# Patient Record
Sex: FEMALE | Race: WHITE | Employment: OTHER | URBAN - METROPOLITAN AREA
[De-identification: names, ages, dates, MRNs, and addresses within clinical notes are randomized per-mention and may not be internally consistent; named-entity substitution may affect disease eponyms.]

---

## 2017-01-18 DIAGNOSIS — I10 ESSENTIAL HYPERTENSION WITH GOAL BLOOD PRESSURE LESS THAN 130/80: ICD-10-CM

## 2017-01-18 RX ORDER — HYDROCHLOROTHIAZIDE 25 MG/1
25 TABLET ORAL DAILY
Qty: 30 TAB | Refills: 5 | Status: SHIPPED | OUTPATIENT
Start: 2017-01-18 | End: 2017-01-18 | Stop reason: SDUPTHER

## 2017-01-18 RX ORDER — HYDROCHLOROTHIAZIDE 25 MG/1
25 TABLET ORAL DAILY
Qty: 30 TAB | Refills: 5 | Status: SHIPPED | OUTPATIENT
Start: 2017-01-18 | End: 2017-07-12 | Stop reason: SDUPTHER

## 2017-01-18 RX ORDER — SIMVASTATIN 40 MG/1
40 TABLET, FILM COATED ORAL
Qty: 30 TAB | Refills: 5 | Status: SHIPPED | OUTPATIENT
Start: 2017-01-18 | End: 2017-07-12 | Stop reason: SDUPTHER

## 2017-01-25 ENCOUNTER — HOSPITAL ENCOUNTER (OUTPATIENT)
Dept: PREADMISSION TESTING | Age: 67
Discharge: HOME | End: 2017-01-25
Payer: COMMERCIAL

## 2017-01-25 VITALS
BODY MASS INDEX: 30.54 KG/M2 | SYSTOLIC BLOOD PRESSURE: 137 MMHG | RESPIRATION RATE: 17 BRPM | WEIGHT: 190.04 LBS | HEIGHT: 66 IN | HEART RATE: 83 BPM | TEMPERATURE: 98 F | OXYGEN SATURATION: 97 % | DIASTOLIC BLOOD PRESSURE: 66 MMHG

## 2017-01-25 LAB
ABO + RH BLD: NORMAL
ALBUMIN SERPL BCP-MCNC: 4.7 G/DL (ref 3.5–5)
ALBUMIN/GLOB SERPL: 1.7 {RATIO} (ref 1.1–2.2)
ALP SERPL-CCNC: 54 U/L (ref 45–117)
ALT SERPL-CCNC: 37 U/L (ref 12–78)
ANION GAP BLD CALC-SCNC: 13 MMOL/L (ref 5–15)
APPEARANCE UR: CLEAR
APTT PPP: 28.2 SEC (ref 22.1–32.5)
AST SERPL W P-5'-P-CCNC: 25 U/L (ref 15–37)
ATRIAL RATE: 67 BPM
BACTERIA URNS QL MICRO: NEGATIVE /HPF
BASOPHILS # BLD AUTO: 0 K/UL (ref 0–0.1)
BASOPHILS # BLD: 1 % (ref 0–1)
BILIRUB SERPL-MCNC: 0.4 MG/DL (ref 0.2–1)
BILIRUB UR QL: NEGATIVE
BLOOD GROUP ANTIBODIES SERPL: NORMAL
BUN SERPL-MCNC: 14 MG/DL (ref 6–20)
BUN/CREAT SERPL: 21 (ref 12–20)
CALCIUM SERPL-MCNC: 9.9 MG/DL (ref 8.5–10.1)
CALCULATED P AXIS, ECG09: 70 DEGREES
CALCULATED R AXIS, ECG10: 41 DEGREES
CALCULATED T AXIS, ECG11: 48 DEGREES
CHLORIDE SERPL-SCNC: 100 MMOL/L (ref 97–108)
CO2 SERPL-SCNC: 26 MMOL/L (ref 21–32)
COLOR UR: NORMAL
CREAT SERPL-MCNC: 0.68 MG/DL (ref 0.55–1.02)
CRP SERPL-MCNC: <0.29 MG/DL
DIAGNOSIS, 93000: NORMAL
EOSINOPHIL # BLD: 0.3 K/UL (ref 0–0.4)
EOSINOPHIL NFR BLD: 5 % (ref 0–7)
EPITH CASTS URNS QL MICRO: NORMAL /LPF
ERYTHROCYTE [DISTWIDTH] IN BLOOD BY AUTOMATED COUNT: 13 % (ref 11.5–14.5)
EST. AVERAGE GLUCOSE BLD GHB EST-MCNC: 105 MG/DL
GLOBULIN SER CALC-MCNC: 2.8 G/DL (ref 2–4)
GLUCOSE SERPL-MCNC: 78 MG/DL (ref 65–100)
GLUCOSE UR STRIP.AUTO-MCNC: NEGATIVE MG/DL
HBA1C MFR BLD: 5.3 % (ref 4.2–6.3)
HCT VFR BLD AUTO: 39.5 % (ref 35–47)
HGB BLD-MCNC: 13.2 G/DL (ref 11.5–16)
HGB UR QL STRIP: NEGATIVE
HYALINE CASTS URNS QL MICRO: NORMAL /LPF (ref 0–5)
INR PPP: 1 (ref 0.9–1.1)
KETONES UR QL STRIP.AUTO: NEGATIVE MG/DL
LEUKOCYTE ESTERASE UR QL STRIP.AUTO: NEGATIVE
LYMPHOCYTES # BLD AUTO: 47 % (ref 12–49)
LYMPHOCYTES # BLD: 2.9 K/UL (ref 0.8–3.5)
MCH RBC QN AUTO: 30.3 PG (ref 26–34)
MCHC RBC AUTO-ENTMCNC: 33.4 G/DL (ref 30–36.5)
MCV RBC AUTO: 90.6 FL (ref 80–99)
MONOCYTES # BLD: 0.4 K/UL (ref 0–1)
MONOCYTES NFR BLD AUTO: 6 % (ref 5–13)
NEUTS SEG # BLD: 2.5 K/UL (ref 1.8–8)
NEUTS SEG NFR BLD AUTO: 41 % (ref 32–75)
NITRITE UR QL STRIP.AUTO: NEGATIVE
P-R INTERVAL, ECG05: 176 MS
PH UR STRIP: 6 [PH] (ref 5–8)
PLATELET # BLD AUTO: 305 K/UL (ref 150–400)
POTASSIUM SERPL-SCNC: 4 MMOL/L (ref 3.5–5.1)
PROT SERPL-MCNC: 7.5 G/DL (ref 6.4–8.2)
PROT UR STRIP-MCNC: NEGATIVE MG/DL
PROTHROMBIN TIME: 10 SEC (ref 9–11.1)
Q-T INTERVAL, ECG07: 408 MS
QRS DURATION, ECG06: 90 MS
QTC CALCULATION (BEZET), ECG08: 431 MS
RBC # BLD AUTO: 4.36 M/UL (ref 3.8–5.2)
RBC #/AREA URNS HPF: NORMAL /HPF (ref 0–5)
SODIUM SERPL-SCNC: 139 MMOL/L (ref 136–145)
SP GR UR REFRACTOMETRY: 1.01 (ref 1–1.03)
SPECIMEN EXP DATE BLD: NORMAL
THERAPEUTIC RANGE,PTTT: NORMAL SECS (ref 58–77)
UA: UC IF INDICATED,UAUC: NORMAL
UROBILINOGEN UR QL STRIP.AUTO: 0.2 EU/DL (ref 0.2–1)
VENTRICULAR RATE, ECG03: 67 BPM
WBC # BLD AUTO: 6.2 K/UL (ref 3.6–11)
WBC URNS QL MICRO: NORMAL /HPF (ref 0–4)

## 2017-01-25 PROCEDURE — 84466 ASSAY OF TRANSFERRIN: CPT | Performed by: ORTHOPAEDIC SURGERY

## 2017-01-25 PROCEDURE — 80053 COMPREHEN METABOLIC PANEL: CPT | Performed by: ORTHOPAEDIC SURGERY

## 2017-01-25 PROCEDURE — 93005 ELECTROCARDIOGRAM TRACING: CPT

## 2017-01-25 PROCEDURE — 85610 PROTHROMBIN TIME: CPT | Performed by: ORTHOPAEDIC SURGERY

## 2017-01-25 PROCEDURE — 81001 URINALYSIS AUTO W/SCOPE: CPT | Performed by: ORTHOPAEDIC SURGERY

## 2017-01-25 PROCEDURE — 36415 COLL VENOUS BLD VENIPUNCTURE: CPT | Performed by: ORTHOPAEDIC SURGERY

## 2017-01-25 PROCEDURE — 85730 THROMBOPLASTIN TIME PARTIAL: CPT | Performed by: ORTHOPAEDIC SURGERY

## 2017-01-25 PROCEDURE — 86140 C-REACTIVE PROTEIN: CPT | Performed by: ORTHOPAEDIC SURGERY

## 2017-01-25 PROCEDURE — 86900 BLOOD TYPING SEROLOGIC ABO: CPT | Performed by: ORTHOPAEDIC SURGERY

## 2017-01-25 PROCEDURE — 83036 HEMOGLOBIN GLYCOSYLATED A1C: CPT | Performed by: ORTHOPAEDIC SURGERY

## 2017-01-25 PROCEDURE — 85025 COMPLETE CBC W/AUTO DIFF WBC: CPT | Performed by: ORTHOPAEDIC SURGERY

## 2017-01-25 RX ORDER — IBUPROFEN 800 MG/1
800 TABLET ORAL
COMMUNITY
End: 2018-07-04 | Stop reason: CLARIF

## 2017-01-25 NOTE — PERIOP NOTES
[facility]  PREOPERATIVE INSTRUCTIONS  02/06/2017  Surgery arrival time given by surgeon: NO   If [facility] staff will call you between 4 PM- 8 PM the day before surgery with your arrival time. If your surgery is on a Monday, we will call you the preceding Friday. Please call [phone] after 8 PM if you did not receive your arrival time. 1. Please report at the designated time to the [address] [place] [address] [facility]. Bring your insurance card, photo identification, and any copayment ( if applicable). 2. You must have a responsible adult to drive you home. You need to have a responsible adult to stay with you the first 24 hours after surgery if you are going home the same day of your surgery and you should not drive a car for 24 hours following your surgery. 3. Nothing to eat or drink after midnight the night before surgery. This includes no water, gum, mints, coffee, juice, etc.  Please note special instructions, if applicable, below for medications. 4. MEDICATIONS TO TAKE THE MORNING OF SURGERY WITH A SIP OF WATER: ________none______        Your prescription pain medicine may be taken with a sip of water the morning of surgery  5. No alcoholic beverages 24 hours before or after your surgery. 6. If you are being admitted to the hospital, please leave personal belongings/luggage in your car until you have an assigned hospital room number. 7. Stop Aspirin and/or any non-steroidal anti-inflammatory drugs (i.e. Ibuprofen, Naproxen, Advil, Aleve) as directed by your surgeon. You may take Tylenol. 8. Stop herbal supplements 1 week prior to surgery. 9. If you are currently taking Plavix, Coumadin,or any other blood-thinning/anticoagulant medication contact your surgeon for instructions. 10. Please wear comfortable clothes. Wear your glasses instead of contacts. We ask that all money, jewelry and valuables be left at home. Wear no make-up, particularly mascara, the day of surgery.    11.  All body piercings, rings and jewelry need to be removed and left at home. Please wear your hair loose or down. Please no pony-tails, buns, or any metal hair accessories. If you shower the morning of surgery, please do not apply any lotions, powders, or deodorants afterwards. Do not shave any body area within 24 hours of your surgery. 12. Please follow all instructions to avoid any potential surgical cancellation. 13. Should your physical condition change, (i.e. fever, cold, flu, etc.) please notify your surgeon as soon as possible. 14. It is important to be on time. If a situation occurs where you may be delayed, please call:  [phone] / [phone] on the day of surgery. 15. The Preadmission Testing staff can be reached at [phone]. Tripp Patrick 12. Bring your completed Medication Reconciliation sheet with your the morning of surgery  Special instructions:   · Free  Parking between [address]  The patient was contacted  in person. She  verbalizes  understanding of all instructions   Medications reviewed and med reconciliation sheets for prescriptions given to patient for review & return day of surgery. Special Instructions:  · Use Chlorhexidine Care Fusion wash and sponges 3 days prior to surgery as instructed. · Incentive spirometer given with instructions to practice at home and bring back to the hospital on the day of surgery. · [facility] will contact you if your Hemoglobin A1C is greater than 7.5. · Ensure/Glucerna  sample, nutritional information, and Ensure/Glucerna coupon given. · Pain pamphlet and Call Don't Fall reminder reviewed with patient.   ·  parking is complimentary Monday - Friday 7 am - 5 pm  · Bring PTA Medication list day of surgery with the last doses taken documented

## 2017-01-25 NOTE — H&P
PAT Pre-Op History & Physical    Patient: Peace Celestin                  MRN: [id number]          SSN: [id number]  YOB: 1950          Age: 69 y.o. Sex: female                Subjective:   Patient is a 69 y.o.  female who presents with history of chronic left hip pain that has been a problem for about 2 years per patient report. Rates left hip pain 5-8/10 ans describes it as a constant nagging ache but can be sharp at times. States pain began in her groin but now is more in the front of her left thigh and into her left knee. The pain is exacerbated by prolonged walking or transitioning positions (standing up from being seated,etc). Has failed steroid injections, NSAIDs, narcotic pain medication, and PT. The patient was evaluated in the surgeon's office and it was determined that the most appropriate plan of care is to proceed with surgical intervention. Patient's PCP Sal Matthew MD            Past Medical History   Diagnosis Date    Arthritis      L knee OA    Hypercholesterolemia     Hypertension     Ill-defined condition      obese  BMI= 30.7 on 1/25/2017    Liver disease      H/O HEPATITIS A  contracted while working in [employer] [facility] Other ill-defined conditions(799.89) [address] AS [facility] 6CM TILT      Past Surgical History   Procedure Laterality Date    Hx appendectomy  1980s    Endoscopy, colon, diagnostic  1995 & 2005 2015    Hx orthopaedic  1995     L BUNIONECTOMY    Hx orthopaedic  4/12     A/S Meniscal Repair L Knee    Hx orthopaedic  4/11     A/S Meniscal Repair R Knee      Prior to Admission medications    Medication Sig Start Date End Date Taking? Authorizing Provider   glucosam-chond-msm-boron-hyal 204-62.9-652-6 mg tab Take 1 Tab by mouth daily. Yes Historical Provider   ibuprofen (MOTRIN) 800 mg tablet Take 800 mg by mouth every six (6) hours as needed for Pain.  Indications: OSTEOARTHRITIS   Yes Historical Provider   hydroCHLOROthiazide (HYDRODIURIL) 25 mg tablet Take 1 Tab by mouth daily for 30 days. 1/18/17 2/17/17 Yes Jose Alejandro Mart MD   simvastatin (ZOCOR) 40 mg tablet Take 1 Tab by mouth nightly. 1/18/17  Yes Jose Alejandro Mart MD   HYDROcodone-acetaminophen (NORCO) 7.5-325 mg per tablet Take 1 Tab by mouth every six (6) hours as needed for Pain. Max Daily Amount: 4 Tabs. 2/8/16  Yes Zelad Martinez MD   multivitamin (ONE A DAY) tablet Take 1 Tab by mouth daily. Yes Historical Provider   FLAXSEED OIL (OMEGA 3 PO) Take 1 Cap by mouth daily. Yes Historical Provider   b complex vitamins tablet Take 1 Tab by mouth daily. Yes Historical Provider   nicotinic acid (NIACIN) 500 mg tablet Take 500 mg by mouth Daily (before breakfast). Yes Historical Provider   OTHER Juice plus for vegetable and fruit supplementation    Yes Historical Provider   aspirin delayed-release 81 mg tablet take 81 mg by mouth daily. Yes Historical Provider     Current Outpatient Prescriptions   Medication Sig    glucosam-chond-msm-boron-hyal 110-69.9-803-3 mg tab Take 1 Tab by mouth daily.  ibuprofen (MOTRIN) 800 mg tablet Take 800 mg by mouth every six (6) hours as needed for Pain. Indications: OSTEOARTHRITIS    hydroCHLOROthiazide (HYDRODIURIL) 25 mg tablet Take 1 Tab by mouth daily for 30 days.  simvastatin (ZOCOR) 40 mg tablet Take 1 Tab by mouth nightly.  HYDROcodone-acetaminophen (NORCO) 7.5-325 mg per tablet Take 1 Tab by mouth every six (6) hours as needed for Pain. Max Daily Amount: 4 Tabs.  multivitamin (ONE A DAY) tablet Take 1 Tab by mouth daily.  FLAXSEED OIL (OMEGA 3 PO) Take 1 Cap by mouth daily.  b complex vitamins tablet Take 1 Tab by mouth daily.  nicotinic acid (NIACIN) 500 mg tablet Take 500 mg by mouth Daily (before breakfast).  OTHER Juice plus for vegetable and fruit supplementation     aspirin delayed-release 81 mg tablet take 81 mg by mouth daily.      No current facility-administered medications for this encounter. No Known Allergies   Social History   Substance Use Topics    Smoking status: Never Smoker    Smokeless tobacco: Never Used    Alcohol use 1.5 - 3.5 oz/week     3 - 7 Standard drinks or equivalent per week      Comment: social glass of wine or beer nightly      Family History   Problem Relation Age of Onset    Heart Disease Mother     Hypertension Mother     Stroke Mother     Elevated Lipids Father     Liver Disease Father     MS Sister     MS Sister     Heart Disease Maternal Grandmother     Stroke Maternal Grandfather           Review of Systems    Patient denies difficulty swallowing, mouth sores, or loose teeth. Patient denies chest pain, tightness, pain radiating down left arm, palpitations. Denies dizziness, visual disturbances, or lightheadedness. Patient denies shortness of breath, wheezing, cough, fever, or chills. Patient denies diarrhea or abdominal pain. C/o constipation at times. Patient denies urinary problems including dysuria, hesitancy, urgency, or incontinence. Denies skin breakdown, rashes, insect bites or open area. Objective:   Patient Vitals for the past 24 hrs:   Temp Pulse Resp BP SpO2   17 1324 98 °F (36.7 °C) 83 17 137/66 97 %     Temp (24hrs), Av °F (36.7 °C), Min:98 °F (36.7 °C), Max:98 °F (36.7 °C)    Body mass index is 30.67 kg/(m^2). Wt Readings from Last 1 Encounters:   17 86.2 kg (190 lb 0.6 oz)        Physical Exam:     General: Pleasant,  cooperative, no apparent distress, appears stated age. Eyes: Conjunctivae/corneas clear. EOMs intact. Nose: Nares normal.   Mouth/Throat: Lips, mucosa, and tongue normal. Teeth and gums normal.   Neck: Supple, symmetrical, trachea midline. Back: Symmetric   Lungs: Clear to auscultation bilaterally. Heart: Regular rate and rhythm, S1, S2 normal. No murmur, click, rub or gallop. Abdomen: Soft, non-tender.  Bowel sounds normal. No distention. Musculoskeletal:  Gait is antalgic. Extremities:  Extremities normal, atraumatic, no cyanosis or edema. Calves                                 supple, non tender to palpation. Pulses: 2+ and symmetric bilateral upper extremities. Cap. refill <2 seconds   Skin: Skin color, texture, turgor normal.  No rashes or lesions. Neurologic: CN II-XII grossly intact. Alert and oriented x3. Labs:   Recent Results (from the past 72 hour(s))   C REACTIVE PROTEIN, QT    Collection Time: 01/25/17  2:37 PM   Result Value Ref Range    C-Reactive protein <0.29 <0.60 mg/dL   CBC WITH AUTOMATED DIFF    Collection Time: 01/25/17  2:37 PM   Result Value Ref Range    WBC 6.2 3.6 - 11.0 K/uL    RBC 4.36 3.80 - 5.20 M/uL    HGB 13.2 11.5 - 16.0 g/dL    HCT 39.5 35.0 - 47.0 %    MCV 90.6 80.0 - 99.0 FL    MCH 30.3 26.0 - 34.0 PG    MCHC 33.4 30.0 - 36.5 g/dL    RDW 13.0 11.5 - 14.5 %    PLATELET 088 508 - 348 K/uL    NEUTROPHILS 41 32 - 75 %    LYMPHOCYTES 47 12 - 49 %    MONOCYTES 6 5 - 13 %    EOSINOPHILS 5 0 - 7 %    BASOPHILS 1 0 - 1 %    ABS. NEUTROPHILS 2.5 1.8 - 8.0 K/UL    ABS. LYMPHOCYTES 2.9 0.8 - 3.5 K/UL    ABS. MONOCYTES 0.4 0.0 - 1.0 K/UL    ABS. EOSINOPHILS 0.3 0.0 - 0.4 K/UL    ABS. BASOPHILS 0.0 0.0 - 0.1 K/UL   METABOLIC PANEL, COMPREHENSIVE    Collection Time: 01/25/17  2:37 PM   Result Value Ref Range    Sodium 139 136 - 145 mmol/L    Potassium 4.0 3.5 - 5.1 mmol/L    Chloride 100 97 - 108 mmol/L    CO2 26 21 - 32 mmol/L    Anion gap 13 5 - 15 mmol/L    Glucose 78 65 - 100 mg/dL    BUN 14 6 - 20 MG/DL    Creatinine 0.68 0.55 - 1.02 MG/DL    BUN/Creatinine ratio 21 (H) 12 - 20      GFR est AA >60 >60 ml/min/1.73m2    GFR est non-AA >60 >60 ml/min/1.73m2    Calcium 9.9 8.5 - 10.1 MG/DL    Bilirubin, total 0.4 0.2 - 1.0 MG/DL    ALT 37 12 - 78 U/L    AST 25 15 - 37 U/L    Alk.  phosphatase 54 45 - 117 U/L    Protein, total 7.5 6.4 - 8.2 g/dL    Albumin 4.7 3.5 - 5.0 g/dL    Globulin 2.8 2.0 - 4.0 g/dL A-G Ratio 1.7 1.1 - 2.2     PROTHROMBIN TIME + INR    Collection Time: 01/25/17  2:37 PM   Result Value Ref Range    INR 1.0 0.9 - 1.1      Prothrombin time 10.0 9.0 - 11.1 sec   PTT    Collection Time: 01/25/17  2:37 PM   Result Value Ref Range    aPTT 28.2 22.1 - 32.5 sec    aPTT, therapeutic range     58.0 - 77.0 SECS   URINALYSIS W/ REFLEX CULTURE    Collection Time: 01/25/17  2:37 PM   Result Value Ref Range    Color YELLOW/STRAW      Appearance CLEAR CLEAR      Specific gravity 1.007 1.003 - 1.030      pH (UA) 6.0 5.0 - 8.0      Protein NEGATIVE  NEG mg/dL    Glucose NEGATIVE  NEG mg/dL    Ketone NEGATIVE  NEG mg/dL    Bilirubin NEGATIVE  NEG      Blood NEGATIVE  NEG      Urobilinogen 0.2 0.2 - 1.0 EU/dL    Nitrites NEGATIVE  NEG      Leukocyte Esterase NEGATIVE  NEG      WBC 0-4 0 - 4 /hpf    RBC 0-5 0 - 5 /hpf    Epithelial cells FEW FEW /lpf    Bacteria NEGATIVE  NEG /hpf    UA:UC IF INDICATED CULTURE NOT INDICATED BY UA RESULT CNI      Hyaline Cast 0-2 0 - 5 /lpf   HEMOGLOBIN A1C WITH EAG    Collection Time: 01/25/17  2:45 PM   Result Value Ref Range    Hemoglobin A1c 5.3 4.2 - 6.3 %    Est. average glucose 105 mg/dL   TYPE & SCREEN    Collection Time: 01/25/17  2:45 PM   Result Value Ref Range    Crossmatch Expiration 02/08/2017     ABO/Rh(D) O NEGATIVE     Antibody screen NEG    EKG, 12 LEAD, INITIAL    Collection Time: 01/25/17  3:19 PM   Result Value Ref Range    Ventricular Rate 67 BPM    Atrial Rate 67 BPM    P-R Interval 176 ms    QRS Duration 90 ms    Q-T Interval 408 ms    QTC Calculation (Bezet) 431 ms    Calculated P Axis 70 degrees    Calculated R Axis 41 degrees    Calculated T Axis 48 degrees    Diagnosis       Normal sinus rhythm  Possible Left atrial enlargement  RSR' or QR pattern in V1 suggests right ventricular conduction delay  Borderline ECG  When compared with ECG of 06-APR-2011 17:55,  No significant change was found  Confirmed by Fredy Oliva MD, [id number] ([id number]) on 1/25/2017 4:30:00 PM Assessment:     Left hip OA. Plan:     Scheduled for left hip arthroplasty- direct anterior. Labs and EKG done per surgeon's orders. Labs and EKG results reviewed- unremarkable. Attended joint class 1/25/2017.     Michelle Bueno NP

## 2017-01-25 NOTE — PERIOP NOTES
[id number] call to Dr Winter Campos office for orders   pt stated she is having her left hip replaced not the right hip as noted on orders and posting    [phone] new orders received stating pt is having left hip replacement

## 2017-01-26 LAB
BACTERIA SPEC CULT: NORMAL
BACTERIA SPEC CULT: NORMAL
SERVICE CMNT-IMP: NORMAL

## 2017-01-27 LAB — TRANSFERRIN SERPL-MCNC: 316 MG/DL (ref 200–370)

## 2017-01-27 NOTE — PROGRESS NOTES
Problem: Patient Education: Go to Patient Education Activity  Goal: Patient/Family Education  Outcome: Progressing Towards Goal  The patient attended the pre-operative joint replacement class. The content of the class, using a power-point presentation as well as visual demonstration was specific for patients undergoing total knee and total hip replacements. A pre-operative education booklet was provided to all patients. At the conclusion of class an opportunity was offered for any question or concerns the patient or family may have regarding their upcoming scheduled procedure. Patient attended class on 1/25/17, no  present.

## 2017-01-31 ENCOUNTER — OFFICE VISIT (OUTPATIENT)
Dept: INTERNAL MEDICINE CLINIC | Age: 67
End: 2017-01-31

## 2017-01-31 VITALS
BODY MASS INDEX: 30.22 KG/M2 | TEMPERATURE: 98.4 F | HEIGHT: 66 IN | RESPIRATION RATE: 16 BRPM | OXYGEN SATURATION: 99 % | WEIGHT: 188 LBS | HEART RATE: 75 BPM | SYSTOLIC BLOOD PRESSURE: 131 MMHG | DIASTOLIC BLOOD PRESSURE: 60 MMHG

## 2017-01-31 DIAGNOSIS — M16.12 PRIMARY OSTEOARTHRITIS OF LEFT HIP: Primary | ICD-10-CM

## 2017-01-31 DIAGNOSIS — K59.04 CHRONIC IDIOPATHIC CONSTIPATION: ICD-10-CM

## 2017-01-31 DIAGNOSIS — Z01.818 PREOP GENERAL PHYSICAL EXAM: ICD-10-CM

## 2017-01-31 DIAGNOSIS — E78.00 HYPERCHOLESTEREMIA: ICD-10-CM

## 2017-01-31 DIAGNOSIS — M85.80 OSTEOPENIA: ICD-10-CM

## 2017-01-31 RX ORDER — CHOLECALCIFEROL (VITAMIN D3) 125 MCG
5 CAPSULE ORAL
COMMUNITY
End: 2018-07-19 | Stop reason: CLARIF

## 2017-01-31 NOTE — PROGRESS NOTES
Have you been to the ER or urgent care clinic since your last visit? No     Have you been hospitalized since your last visit? No     Have you been seen or consulted any other health care provider outside of [address] [place] [address] since your last visit (including pap smears, colonoscopy screening)?   No

## 2017-01-31 NOTE — MR AVS SNAPSHOT
Visit Information Date & Time Provider Department Dept. Phone Encounter #  
 1/31/2017  2:00 PM Trev Camarena NP [facility] [address] [id number] Upcoming Health Maintenance Date Due COLONOSCOPY 1/18/1966 Pneumococcal 65+ Low/Medium Risk (2 of 2 - PCV13) 9/8/2015 INFLUENZA AGE 9 TO ADULT 8/1/2016 MEDICARE YEARLY EXAM 8/17/2017 GLAUCOMA SCREENING Q2Y 8/17/2018 BREAST CANCER SCRN MAMMOGRAM 9/29/2018 DTaP/Tdap/Td series (2 - Td) 1/1/2023 Allergies as of 1/31/2017  Review Complete On: 1/31/2017 By: Trev Camarena NP No Known Allergies Current Immunizations  Reviewed on 9/8/2014 Name Date Influenza Vaccine 10/15/2015 Influenza Vaccine Whole 9/8/2012 PPD 9/8/2012 Pneumococcal Polysaccharide (PPSV-23) 9/8/2014  3:53 PM  
 Tdap 1/1/2013 Zoster Vaccine, Live 8/16/2015 Not reviewed this visit You Were Diagnosed With   
  
 Codes Comments Primary osteoarthritis of left hip    -  Primary ICD-10-CM: M16.12 
ICD-9-CM: 715.15 Preop general physical exam     ICD-10-CM: Z33.015 ICD-9-CM: V72.83 Hypercholesteremia     ICD-10-CM: E78.00 ICD-9-CM: 272.0 Osteopenia     ICD-10-CM: M85.80 ICD-9-CM: 733.90 Chronic idiopathic constipation     ICD-10-CM: K59.04 
ICD-9-CM: 564.00 Vitals BP Pulse Temp Resp Height(growth percentile) Weight(growth percentile) 131/60 (BP 1 Location: Left arm, BP Patient Position: Sitting) 75 98.4 °F (36.9 °C) (Oral) 16 5' 6\" (1.676 m) 188 lb (85.3 kg) SpO2 BMI OB Status Smoking Status 99% 30.34 kg/m2 Postmenopausal Never Smoker BMI and BSA Data Body Mass Index Body Surface Area  
 30.34 kg/m 2 1.99 m 2 Preferred Pharmacy Pharmacy Name Phone [id number] [address] [phone] Your Updated Medication List  
  
   
 This list is accurate as of: 1/31/17  2:53 PM.  Always use your most recent med list.  
  
  
  
  
 aspirin delayed-release 81 mg tablet  
take 81 mg by mouth daily. b complex vitamins tablet Take 1 Tab by mouth daily. glucosam-chond-msm-boron-hyal 984-58.3-751-6 mg Tab Take 1 Tab by mouth daily. hydroCHLOROthiazide 25 mg tablet Commonly known as:  HYDRODIURIL Take 1 Tab by mouth daily for 30 days. HYDROcodone-acetaminophen 7.5-325 mg per tablet Commonly known as:  Allison Pablo Take 1 Tab by mouth every six (6) hours as needed for Pain. Max Daily Amount: 4 Tabs. ibuprofen 800 mg tablet Commonly known as:  MOTRIN Take 800 mg by mouth every six (6) hours as needed for Pain. Indications: OSTEOARTHRITIS  
  
 melatonin Tab tablet Take 5 mg by mouth nightly. Indications: 2 tabltes  
  
 multivitamin tablet Commonly known as:  ONE A DAY Take 1 Tab by mouth daily. nicotinic acid 500 mg tablet Commonly known as:  NIACIN Take 500 mg by mouth Daily (before breakfast). OMEGA 3 PO Take 1 Cap by mouth daily. OTHER Juice plus for vegetable and fruit supplementation  
  
 simvastatin 40 mg tablet Commonly known as:  ZOCOR Take 1 Tab by mouth nightly. Patient Instructions Hip Replacement: Before Your Surgery What is hip replacement surgery? Hip replacement surgery uses metal, ceramic, or plastic parts to replace the ball at the top of the thighbone (femur). In a total hip replacement, the doctor also replaces the hip socket. In a partial hip replacement, the socket is not replaced. For traditional surgery, your doctor will make a 6- to 10-inch cut (incision) on the side or the back of your hip. The surgery can also be done with one or two smaller cuts. This is called minimally invasive surgery. Another type of surgery is done through a small cut on the front (anterior) of the hip.  Your doctor will talk with you which type of surgery might be best for you. You will likely stay in the hospital for 1 to 7 days after your surgery. Your physical therapy will start during this time. You may need physical therapy for several weeks after you leave the hospital. At home you'll keep doing the exercises you learned. It usually takes 3 to 6 months to get back to full activity. Your doctor will tell you when you can go back to work. This depends on the kind of surgery and the type of job you have. After you recover, you likely will have much less pain than before the surgery. You should be able to return to most of your normal activities. Your doctor may suggest that you avoid strenuous activities. These include running, tennis, and any type of skiing. You may have to take antibiotics before you have dental work or a medical procedure. This helps reduce the chance that your new hip will become infected. Always tell your caregivers that you have an artificial hip. Follow-up care is a key part of your treatment and safety. Be sure to make and go to all appointments, and call your doctor if you are having problems. It's also a good idea to know your test results and keep a list of the medicines you take. What happens before surgery? Surgery can be stressful. This information will help you understand what you can expect. And it will help you safely prepare for surgery. Preparing for surgery · Understand exactly what surgery is planned, along with the risks, benefits, and other options. · Tell your doctors ALL the medicines, vitamins, supplements, and herbal remedies you take. Some of these can increase the risk of bleeding or interact with anesthesia. · If you take aspirin or some other blood thinner, be sure to talk to your doctor. He or she will tell you if you should stop taking it before your surgery. Make sure that you understand exactly what your doctor wants you to do. · Your doctor will tell you which medicines to take or stop before your surgery. You may need to stop taking certain medicines a week or more before surgery. So talk to your doctor as soon as you can. · If you have an advance directive, let your doctor know. It may include a living will and a durable power of  for health care. Bring a copy to the hospital. If you don't have one, you may want to prepare one. It lets your doctor and loved ones know your health care wishes. Doctors advise that everyone prepare these papers before any type of surgery or procedure. What happens on the day of surgery? · Follow the instructions exactly about when to stop eating and drinking. If you don't, your surgery may be canceled. If your doctor told you to take your medicines on the day of surgery, take them with only a sip of water. · Take a bath or shower before you come in for your surgery. Do not apply lotions, perfumes, deodorants, or nail polish. · Do not shave the surgical site yourself. · Take off all jewelry and piercings. And take out contact lenses, if you wear them. At the hospital or surgery center · Bring a picture ID. · The area for surgery is often marked to make sure there are no errors. · You will get antibiotics through the IV tube before surgery. This lowers the risk of an infection. · You will be kept comfortable and safe by your anesthesia provider. The anesthesia may make you sleep. Or it may just numb the area being worked on. · The surgery usually takes 1 to 3 hours. Going home · Be sure you have someone to drive you home. Anesthesia and pain medicine make it unsafe for you to drive. · At first, you will need someone who can help you day and night. You can go home from the hospital if you have help at home and your recovery is going well. You can go to a rehabilitation center if you don't have help at home or if you need extra care. · You will be given more specific instructions about recovering from your surgery. They will cover things like diet, wound care, follow-up care, driving, and getting back to your normal routine. When should you call your doctor? · You have questions or concerns. · You don't understand how to prepare for your surgery. · You become ill before the surgery (such as fever, flu, or a cold). · You need to reschedule or have changed your mind about having the surgery. Where can you learn more? Go to http://rao-alice.info/. Enter 99 [id number] in the search box to learn more about \"Hip Replacement: Before Your Surgery. \" Current as of: May 23, 2016 Content Version: 11.1 © 7262-4605 [employer]. Care instructions adapted under license by [employer] (which disclaims liability or warranty for this information). If you have questions about a medical condition or this instruction, always ask your healthcare professional. [place] [address] any warranty or liability for your use of this information. Introducing hospitals & HEALTH SERVICES! Dear Ambrosio Thomas: Thank you for requesting a Secur[employer] account. Our records indicate that you already have an active Secur[employer] account. You can access your account anytime at [email or web address]. "[employer]"/Fonix Did you know that you can access your hospital and ER discharge instructions at any time in SecurActive? You can also review all of your test results from your hospital stay or ER visit. Additional Information If you have questions, please visit the Frequently Asked Questions section of the SecurActive website at [email or web address]. "[employer]"/Restalot/. Remember, SecurActive is NOT to be used for urgent needs. For medical emergencies, dial 911. Now available from your iPhone and Android! Please provide this summary of care documentation to your next provider. Your primary care clinician is listed as Aris Hui. If you have any questions after today's visit, please call [phone].

## 2017-01-31 NOTE — PROGRESS NOTES
HISTORY OF PRESENT ILLNESS  Hernan Hay is a 69 y.o. female. HPI  Hernan Hay was referred for evaluation by :Dr. Aden Scheuermann for Pre- Op Evaluation. Please see encounter details and orders for consultative summary. Type of surgery : Left Total Hip Arthroplasty with direct anterior approach  Surgery site : Left hip  Anesthesia type: General  Date of procedure:  2/6/2017    Allergies: No Known Allergies  Latex allergy: no  Prior reactions to anesthesia:  None    Functional status:  she is able to ambulate up 1 flights of step without shortness of breath, chest pain  Prior cardiac evaluation:   none    Current Outpatient Prescriptions   Medication Sig    melatonin tab tablet Take 5 mg by mouth nightly. Indications: 2 tabltes    glucosam-chond-msm-boron-hyal 266-09.8-157-2 mg tab Take 1 Tab by mouth daily.  hydroCHLOROthiazide (HYDRODIURIL) 25 mg tablet Take 1 Tab by mouth daily for 30 days.  simvastatin (ZOCOR) 40 mg tablet Take 1 Tab by mouth nightly.  HYDROcodone-acetaminophen (NORCO) 7.5-325 mg per tablet Take 1 Tab by mouth every six (6) hours as needed for Pain. Max Daily Amount: 4 Tabs.  multivitamin (ONE A DAY) tablet Take 1 Tab by mouth daily.  FLAXSEED OIL (OMEGA 3 PO) Take 1 Cap by mouth daily.  b complex vitamins tablet Take 1 Tab by mouth daily.  nicotinic acid (NIACIN) 500 mg tablet Take 500 mg by mouth Daily (before breakfast).  OTHER Juice plus for vegetable and fruit supplementation     ibuprofen (MOTRIN) 800 mg tablet Take 800 mg by mouth every six (6) hours as needed for Pain. Indications: OSTEOARTHRITIS    aspirin delayed-release 81 mg tablet take 81 mg by mouth daily. No current facility-administered medications for this visit.       Past Medical History   Diagnosis Date    Arthritis      L knee OA    Hypercholesterolemia     Hypertension     Ill-defined condition      obese  BMI= 30.7 on 1/25/2017    Liver disease      H/O HEPATITIS A  contracted while working in [facility] Other ill-defined conditions(799.89) [address] [facility] 6CM TILT     Past Surgical History   Procedure Laterality Date    Hx appendectomy  1980s    Endoscopy, colon, diagnostic  1995 & 2005 2015    Hx orthopaedic  1995     L BUNIONECTOMY    Hx orthopaedic  4/12     A/S Meniscal Repair L Knee    Hx orthopaedic  4/11     A/S Meniscal Repair R Knee    Hx heent       wisdom teeth extraction     Social History   Substance Use Topics    Smoking status: Never Smoker    Smokeless tobacco: Never Used    Alcohol use 1.5 - 3.5 oz/week     3 - 7 Standard drinks or equivalent per week      Comment: social glass of wine or beer nightly       Blood pressure 131/60, pulse 75, temperature 98.4 °F (36.9 °C), temperature source Oral, resp. rate 16, height 5' 6\" (1.676 m), weight 188 lb (85.3 kg), SpO2 99 %. Review of Systems   Constitutional: Negative for chills, fever and malaise/fatigue. HENT: Negative for congestion and sore throat. Respiratory: Negative for cough and shortness of breath. Cardiovascular: Negative for chest pain and palpitations. Gastrointestinal: Positive for constipation. Negative for abdominal pain, nausea and vomiting. Genitourinary: Negative for dysuria and frequency. Musculoskeletal: Positive for joint pain (left hip ). Skin: Negative for rash. Neurological: Negative for dizziness, tingling and headaches. Psychiatric/Behavioral: Negative for depression and suicidal ideas. The patient is not nervous/anxious. Physical Exam   Constitutional: She is oriented to person, place, and time. She appears well-developed and well-nourished. HENT:   Head: Normocephalic and atraumatic. Right Ear: External ear normal.   Left Ear: External ear normal.   Nose: Nose normal.   Mouth/Throat: Oropharynx is clear and moist.   Neck: Normal range of motion. Neck supple. No thyromegaly present.    Cardiovascular: Normal rate and regular rhythm. Pulmonary/Chest: Effort normal and breath sounds normal.   Abdominal: Soft. Bowel sounds are normal. There is no tenderness. There is no rebound. Musculoskeletal:        Left hip: She exhibits decreased range of motion, decreased strength and bony tenderness. Lymphadenopathy:     She has no cervical adenopathy. Neurological: She is alert and oriented to person, place, and time. No cranial nerve deficit. Skin: Skin is warm and dry. No rash noted. Psychiatric: She has a normal mood and affect. Her behavior is normal.   Nursing note and vitals reviewed.       ASSESSMENT and Yenni Starks was seen today for pre-op exam.    Diagnoses and all orders for this visit:    Primary osteoarthritis of left hip    Preop general physical exam -- all preoperative labwork and EKG reviewed; considered medically stable for General anesthesia for upcoming Left Total Hip Arthroplasty with direct anterior approach    Hypercholesteremia    Osteopenia    Chronic idiopathic constipation      lab results and schedule of future lab studies reviewed with patient  reviewed diet, exercise and weight control  reviewed medications and side effects in detail

## 2017-01-31 NOTE — PATIENT INSTRUCTIONS
Hip Replacement: Before Your Surgery  What is hip replacement surgery? Hip replacement surgery uses metal, ceramic, or plastic parts to replace the ball at the top of the thighbone (femur). In a total hip replacement, the doctor also replaces the hip socket. In a partial hip replacement, the socket is not replaced. For traditional surgery, your doctor will make a 6- to 10-inch cut (incision) on the side or the back of your hip. The surgery can also be done with one or two smaller cuts. This is called minimally invasive surgery. Another type of surgery is done through a small cut on the front (anterior) of the hip. Your doctor will talk with you which type of surgery might be best for you. You will likely stay in the hospital for 1 to 7 days after your surgery. Your physical therapy will start during this time. You may need physical therapy for several weeks after you leave the hospital. At home you'll keep doing the exercises you learned. It usually takes 3 to 6 months to get back to full activity. Your doctor will tell you when you can go back to work. This depends on the kind of surgery and the type of job you have. After you recover, you likely will have much less pain than before the surgery. You should be able to return to most of your normal activities. Your doctor may suggest that you avoid strenuous activities. These include running, tennis, and any type of skiing. You may have to take antibiotics before you have dental work or a medical procedure. This helps reduce the chance that your new hip will become infected. Always tell your caregivers that you have an artificial hip. Follow-up care is a key part of your treatment and safety. Be sure to make and go to all appointments, and call your doctor if you are having problems. It's also a good idea to know your test results and keep a list of the medicines you take. What happens before surgery? Surgery can be stressful.  This information will help you understand what you can expect. And it will help you safely prepare for surgery. Preparing for surgery  · Understand exactly what surgery is planned, along with the risks, benefits, and other options. · Tell your doctors ALL the medicines, vitamins, supplements, and herbal remedies you take. Some of these can increase the risk of bleeding or interact with anesthesia. · If you take aspirin or some other blood thinner, be sure to talk to your doctor. He or she will tell you if you should stop taking it before your surgery. Make sure that you understand exactly what your doctor wants you to do. · Your doctor will tell you which medicines to take or stop before your surgery. You may need to stop taking certain medicines a week or more before surgery. So talk to your doctor as soon as you can. · If you have an advance directive, let your doctor know. It may include a living will and a durable power of  for health care. Bring a copy to the hospital. If you don't have one, you may want to prepare one. It lets your doctor and loved ones know your health care wishes. Doctors advise that everyone prepare these papers before any type of surgery or procedure. What happens on the day of surgery? · Follow the instructions exactly about when to stop eating and drinking. If you don't, your surgery may be canceled. If your doctor told you to take your medicines on the day of surgery, take them with only a sip of water. · Take a bath or shower before you come in for your surgery. Do not apply lotions, perfumes, deodorants, or nail polish. · Do not shave the surgical site yourself. · Take off all jewelry and piercings. And take out contact lenses, if you wear them. At the hospital or surgery center  · Bring a picture ID. · The area for surgery is often marked to make sure there are no errors. · You will get antibiotics through the IV tube before surgery. This lowers the risk of an infection.   · You will be kept comfortable and safe by your anesthesia provider. The anesthesia may make you sleep. Or it may just numb the area being worked on. · The surgery usually takes 1 to 3 hours. Going home  · Be sure you have someone to drive you home. Anesthesia and pain medicine make it unsafe for you to drive. · At first, you will need someone who can help you day and night. You can go home from the hospital if you have help at home and your recovery is going well. You can go to a rehabilitation center if you don't have help at home or if you need extra care. · You will be given more specific instructions about recovering from your surgery. They will cover things like diet, wound care, follow-up care, driving, and getting back to your normal routine. When should you call your doctor? · You have questions or concerns. · You don't understand how to prepare for your surgery. · You become ill before the surgery (such as fever, flu, or a cold). · You need to reschedule or have changed your mind about having the surgery. Where can you learn more? Go to http://rao-alice.info/. Enter 99 [id number] in the search box to learn more about \"Hip Replacement: Before Your Surgery. \"  Current as of: May 23, 2016  Content Version: 11.1  © 4478-9366 [employer]. Care instructions adapted under license by [employer] (which disclaims liability or warranty for this information). If you have questions about a medical condition or this instruction, always ask your healthcare professional. [place] [address] any warranty or liability for your use of this information.

## 2017-02-05 ENCOUNTER — ANESTHESIA EVENT (OUTPATIENT)
Dept: SURGERY | Age: 67
DRG: 470 | End: 2017-02-05
Payer: COMMERCIAL

## 2017-02-05 NOTE — H&P
Orthopaedic PRE-OP Admission History and Physical    Past Medical History   Diagnosis Date    Arthritis      L knee OA    Hypercholesterolemia     Hypertension     Ill-defined condition      obese  BMI= 30.7 on 1/25/2017    Liver disease      H/O HEPATITIS A  contracted while working in [facility] Other ill-defined conditions(799.89) [address] Orozco AS PEDESTRIAN-PELVIS 6CM TILT      Past Surgical History   Procedure Laterality Date    Hx appendectomy  1980s    Endoscopy, colon, diagnostic  1995 & 2005 2015    Hx orthopaedic  1995     L BUNIONECTOMY    Hx orthopaedic  4/12     A/S Meniscal Repair L Knee    Hx orthopaedic  4/11     A/S Meniscal Repair R Knee    Hx heent       wisdom teeth extraction      Prior to Admission medications    Medication Sig Start Date End Date Taking? Authorizing Provider   melatonin tab tablet Take 5 mg by mouth nightly. Indications: 2 tabltes    Historical Provider   glucosam-chond-msm-boron-hyal 274-63.8-575-4 mg tab Take 1 Tab by mouth daily. Historical Provider   ibuprofen (MOTRIN) 800 mg tablet Take 800 mg by mouth every six (6) hours as needed for Pain. Indications: OSTEOARTHRITIS    Historical Provider   hydroCHLOROthiazide (HYDRODIURIL) 25 mg tablet Take 1 Tab by mouth daily for 30 days. 1/18/17 2/17/17  Robert Dotson MD   simvastatin (ZOCOR) 40 mg tablet Take 1 Tab by mouth nightly. 1/18/17   Robert Dotson MD   HYDROcodone-acetaminophen (NORCO) 7.5-325 mg per tablet Take 1 Tab by mouth every six (6) hours as needed for Pain. Max Daily Amount: 4 Tabs. 2/8/16   Fabiana Moore MD   multivitamin (ONE A DAY) tablet Take 1 Tab by mouth daily. Historical Provider   FLAXSEED OIL (OMEGA 3 PO) Take 1 Cap by mouth daily. Historical Provider   b complex vitamins tablet Take 1 Tab by mouth daily. Historical Provider   nicotinic acid (NIACIN) 500 mg tablet Take 500 mg by mouth Daily (before breakfast).     Historical Provider   OTHER Juice plus for vegetable and fruit supplementation     Historical Provider   aspirin delayed-release 81 mg tablet take 81 mg by mouth daily. Historical Provider     No current facility-administered medications for this encounter. Current Outpatient Prescriptions   Medication Sig    melatonin tab tablet Take 5 mg by mouth nightly. Indications: 2 tabltes    glucosam-chond-msm-boron-hyal 384-54.4-827-9 mg tab Take 1 Tab by mouth daily.  ibuprofen (MOTRIN) 800 mg tablet Take 800 mg by mouth every six (6) hours as needed for Pain. Indications: OSTEOARTHRITIS    hydroCHLOROthiazide (HYDRODIURIL) 25 mg tablet Take 1 Tab by mouth daily for 30 days.  simvastatin (ZOCOR) 40 mg tablet Take 1 Tab by mouth nightly.  HYDROcodone-acetaminophen (NORCO) 7.5-325 mg per tablet Take 1 Tab by mouth every six (6) hours as needed for Pain. Max Daily Amount: 4 Tabs.  multivitamin (ONE A DAY) tablet Take 1 Tab by mouth daily.  FLAXSEED OIL (OMEGA 3 PO) Take 1 Cap by mouth daily.  b complex vitamins tablet Take 1 Tab by mouth daily.  nicotinic acid (NIACIN) 500 mg tablet Take 500 mg by mouth Daily (before breakfast).  OTHER Juice plus for vegetable and fruit supplementation     aspirin delayed-release 81 mg tablet take 81 mg by mouth daily. No Known Allergies     Review of Systems  Review of systems was documented in PAT and also in the HPI. Physical Exam  Gen: No acute distress   Resp: No accessory muscle use, no acute distress, conversant without gasping, clear lung fields. Card: No abnormalities detected, RRR- See PAT exam if available. Abd: Soft, non-tender, non-distended  Lymph: No palpable lymph nodes of the affected extremity  Skin: No skin breakdown noted. Labs: No results for input(s): WBC, HGB, HCT, K, CREA, GLU, CRP, HGBEXT, HCTEXT in the last 72 hours.     No lab exists for component: ESR    [facility] Note - Subjective / Exam / Alois Rue / Plan      Chief Complaint    mri left hip   SUBJECTIVE :      The patient is seen in follow-up for to review their MRI findings. Since the last visit they note continued severe hip pain with rotational pain and groin pain on the left. The patient notes nearly daily pain, which severely limits her activities, causing severe issues with rotation at the hip. The patient did not improve markedly with previous attempts at physical therapy, she is on an anti-inflammator, and has daily pain with walking. 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      OBJECTIVE :    Exam demonstrates equal leg lengths, moderate soft tissue over the anterior hip, painful Stinchfield testing, painful impingement testing, motor sensory exam is intact. The patient walks with a clearly coxalgic gait. 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 There is irregularity of the joint surface. Secondary changes to include osteophyte formation, subchondral sclerosis and flattening of the femoral head are present. ASSESSMENT :    Left hip severe arthritis with avascular necrosis and partial collapse. PLAN:     DISCUSSION :  I have discussed with the patient it may be 2-3 months before she can return to driving for long periods, or prolonged meetings. The patient would be able to work remotely from home soon after the surgery. The patient lives alone, but she does have friends that she could call for social support after the surgery. The patient's sister may also be available after the surgery. The patient has reasonable postoperative expectations, we have discussed a direct anterior approach to the hip.       MEDICAL CONSIDERATIONS :  None    PHYSICAL THERAPY :  At home exercises    MEDICATIONS :  Anti-inflammatories     FOLLOW-UP :  The patient should follow up for primary care clearance, preadmission testing, joint class. We will issue a packet for the patient, once a date has been selected. SPECIAL SURGICAL CONSIDERATIONS :      Direct anterior     SOCIAL CONSIDERATIONS :      The patient's sister, or friend will stay with patient following surgery. COUNSELING :     I have discussed the planned surgery with the patient in detail. We have discussed the risks, alternatives, and benefits of the surgery. Risks of surgery include infection, bleeding, damage to neurovascular structures, the need for further surgery, and the risk associated with anesthesia. Specific risks of the surgery were also discussed including continued hip pain, deep infection, dislocation. The patient may require primary care clearance for surgery. Please do not hesitate to contact my office at  if you have any concerns. Active Problems   Hip pain   (M25.559)  Primary localized osteoarthritis of left hip   (M16.12)  Right hand pain   (M79.641). Current Meds   Niacin 500 MG Oral Tablet;; RPT  Simvastatin 40 MG Oral Tablet;; RPT  HydroCHLOROthiazide 25 MG Oral Tablet;; RPT  Hydrocodone-Acetaminophen 7.5-325 MG Oral Tablet;; RPT  Ofloxacin 0.3 % Ophthalmic Solution;; RPT  Diclofenac Sodium 75 MG Oral Tablet Delayed Release;TAKE 1 TABLET TWICE DAILY WITH FOOD.; Rx  Ibuprofen 800 MG Oral Tablet;TAKE 1 TABLET 3 TIMES DAILY AS NEEDED.; Rx. Allergies   No Known Drug Allergies. PMH   Arthritis (M19.90)  Hepatitis (K75.9)  High blood pressure (I10). PSH   Appendectomy  Oral Surgery Tooth Extraction. Family Hx   Arthritis: Other (M19.90)  Cancer: Father (C80.1)  Family history of cerebrovascular accident (CVA):  Mother (Z82.3)  Family history of malignant neoplasm: Father (Z80.9)  Family history of other medical problems: Sister (Z84.89); : MS.  Personal Hx   Alcohol drinker (Z78.9); : 7  Current non-smoker (Z78.9)  Living alone (Z60.2)  Never smoked tobacco (Z78.9)  Occupation; : [address] [employer]. Signature   Electronically signed by : Chang Kim MD, MD; 12/22/2016 6:10 AM EST; Author. Surgical Counseling   After a thorough discussion we will proceed with surgical intervention without contraindications. I discussed surgical indications and alternatives with the patient. Risks including infection, bleeding, damage to other structures, need for further surgery and the risks of anesthesia (DVT, PE, Stroke, Heart Attack and Death) have been discussed with the patient. Verbal and written consent were obtained. For MRSA positive nasal cultures identified prior to surgery Vanco may be given at the discretion of the surgeon based on allergies.        Catie Duvall MD  [facility]  Pager [phone]

## 2017-02-06 ENCOUNTER — HOSPITAL ENCOUNTER (INPATIENT)
Age: 67
LOS: 2 days | Discharge: HOME HEALTH | DRG: 470 | End: 2017-02-08
Attending: ORTHOPAEDIC SURGERY | Admitting: ORTHOPAEDIC SURGERY
Payer: COMMERCIAL

## 2017-02-06 ENCOUNTER — ANESTHESIA (OUTPATIENT)
Dept: SURGERY | Age: 67
DRG: 470 | End: 2017-02-06
Payer: COMMERCIAL

## 2017-02-06 ENCOUNTER — APPOINTMENT (OUTPATIENT)
Dept: GENERAL RADIOLOGY | Age: 67
DRG: 470 | End: 2017-02-06
Attending: ORTHOPAEDIC SURGERY
Payer: COMMERCIAL

## 2017-02-06 ENCOUNTER — APPOINTMENT (OUTPATIENT)
Dept: GENERAL RADIOLOGY | Age: 67
DRG: 470 | End: 2017-02-06
Attending: NURSE PRACTITIONER
Payer: COMMERCIAL

## 2017-02-06 ENCOUNTER — HOME HEALTH ADMISSION (OUTPATIENT)
Dept: HOME HEALTH SERVICES | Facility: HOME HEALTH | Age: 67
End: 2017-02-06
Payer: MEDICARE

## 2017-02-06 ENCOUNTER — SURGERY (OUTPATIENT)
Age: 67
End: 2017-02-06

## 2017-02-06 PROBLEM — M16.12 PRIMARY LOCALIZED OSTEOARTHRITIS OF LEFT HIP: Status: ACTIVE | Noted: 2017-02-06

## 2017-02-06 PROCEDURE — 76030000020 HC AMB SURG 1.5 TO 2 HR INTENSV-TIER 1: Performed by: ORTHOPAEDIC SURGERY

## 2017-02-06 PROCEDURE — 77030031139 HC SUT VCRL2 J&J -A: Performed by: ORTHOPAEDIC SURGERY

## 2017-02-06 PROCEDURE — 77030007866 HC KT SPN ANES BBMI -B

## 2017-02-06 PROCEDURE — 74011250636 HC RX REV CODE- 250/636

## 2017-02-06 PROCEDURE — 74011000250 HC RX REV CODE- 250

## 2017-02-06 PROCEDURE — 74011250637 HC RX REV CODE- 250/637: Performed by: NURSE PRACTITIONER

## 2017-02-06 PROCEDURE — 74011250636 HC RX REV CODE- 250/636: Performed by: NURSE PRACTITIONER

## 2017-02-06 PROCEDURE — 76060000063 HC AMB SURG ANES 1.5 TO 2 HR: Performed by: ORTHOPAEDIC SURGERY

## 2017-02-06 PROCEDURE — 97161 PT EVAL LOW COMPLEX 20 MIN: CPT

## 2017-02-06 PROCEDURE — 77030011640 HC PAD GRND REM COVD -A: Performed by: ORTHOPAEDIC SURGERY

## 2017-02-06 PROCEDURE — 74011250637 HC RX REV CODE- 250/637: Performed by: ORTHOPAEDIC SURGERY

## 2017-02-06 PROCEDURE — 77030002933 HC SUT MCRYL J&J -A: Performed by: ORTHOPAEDIC SURGERY

## 2017-02-06 PROCEDURE — 77030012893

## 2017-02-06 PROCEDURE — 77030020782 HC GWN BAIR PAWS FLX 3M -B

## 2017-02-06 PROCEDURE — 74011000272 HC RX REV CODE- 272: Performed by: ORTHOPAEDIC SURGERY

## 2017-02-06 PROCEDURE — 65270000029 HC RM PRIVATE

## 2017-02-06 PROCEDURE — 74011000250 HC RX REV CODE- 250: Performed by: ANESTHESIOLOGY

## 2017-02-06 PROCEDURE — 76001 XR FLUOROSCOPY OVER 60 MINUTES: CPT

## 2017-02-06 PROCEDURE — 77030018883 HC BLD SAW SAG4 STRY -B: Performed by: ORTHOPAEDIC SURGERY

## 2017-02-06 PROCEDURE — 74011250636 HC RX REV CODE- 250/636: Performed by: ANESTHESIOLOGY

## 2017-02-06 PROCEDURE — 97530 THERAPEUTIC ACTIVITIES: CPT

## 2017-02-06 PROCEDURE — 74011000250 HC RX REV CODE- 250: Performed by: ORTHOPAEDIC SURGERY

## 2017-02-06 PROCEDURE — C1776 JOINT DEVICE (IMPLANTABLE): HCPCS | Performed by: ORTHOPAEDIC SURGERY

## 2017-02-06 PROCEDURE — 77030010507 HC ADH SKN DERMBND J&J -B: Performed by: ORTHOPAEDIC SURGERY

## 2017-02-06 PROCEDURE — 76210000035 HC AMBSU PH I REC 1 TO 1.5 HR: Performed by: ORTHOPAEDIC SURGERY

## 2017-02-06 PROCEDURE — 74011250637 HC RX REV CODE- 250/637: Performed by: ANESTHESIOLOGY

## 2017-02-06 PROCEDURE — 77030018836 HC SOL IRR NACL ICUM -A: Performed by: ORTHOPAEDIC SURGERY

## 2017-02-06 PROCEDURE — 77030032490 HC SLV COMPR SCD KNE COVD -B

## 2017-02-06 PROCEDURE — 0SRB02A REPLACEMENT OF LEFT HIP JOINT WITH METAL ON POLYETHYLENE SYNTHETIC SUBSTITUTE, UNCEMENTED, OPEN APPROACH: ICD-10-PCS | Performed by: ORTHOPAEDIC SURGERY

## 2017-02-06 PROCEDURE — 97116 GAIT TRAINING THERAPY: CPT

## 2017-02-06 PROCEDURE — 72170 X-RAY EXAM OF PELVIS: CPT

## 2017-02-06 DEVICE — HEAD FEM DELT V40 -5MM NK 36MM -- V40 BIOLOX: Type: IMPLANTABLE DEVICE | Site: HIP | Status: FUNCTIONAL

## 2017-02-06 DEVICE — INSERT ACET 0DEG 36MM E X3 -- TRIDENT: Type: IMPLANTABLE DEVICE | Site: HIP | Status: FUNCTIONAL

## 2017-02-06 DEVICE — COMPONENT HIP PRSS FT MTL ON CERM POLYETH X3: Type: IMPLANTABLE DEVICE | Site: HIP | Status: FUNCTIONAL

## 2017-02-06 RX ORDER — OXYCODONE HYDROCHLORIDE 5 MG/1
10 TABLET ORAL
Status: DISCONTINUED | OUTPATIENT
Start: 2017-02-06 | End: 2017-02-08 | Stop reason: HOSPADM

## 2017-02-06 RX ORDER — LANOLIN ALCOHOL/MO/W.PET/CERES
5 CREAM (GRAM) TOPICAL
Status: DISCONTINUED | OUTPATIENT
Start: 2017-02-06 | End: 2017-02-08 | Stop reason: HOSPADM

## 2017-02-06 RX ORDER — NALOXONE HYDROCHLORIDE 0.4 MG/ML
0.4 INJECTION, SOLUTION INTRAMUSCULAR; INTRAVENOUS; SUBCUTANEOUS AS NEEDED
Status: DISCONTINUED | OUTPATIENT
Start: 2017-02-06 | End: 2017-02-08 | Stop reason: HOSPADM

## 2017-02-06 RX ORDER — SODIUM CHLORIDE 0.9 % (FLUSH) 0.9 %
5-10 SYRINGE (ML) INJECTION EVERY 8 HOURS
Status: DISCONTINUED | OUTPATIENT
Start: 2017-02-06 | End: 2017-02-06 | Stop reason: HOSPADM

## 2017-02-06 RX ORDER — HYDROCHLOROTHIAZIDE 25 MG/1
25 TABLET ORAL DAILY
Status: DISCONTINUED | OUTPATIENT
Start: 2017-02-06 | End: 2017-02-08 | Stop reason: HOSPADM

## 2017-02-06 RX ORDER — CELECOXIB 100 MG/1
200 CAPSULE ORAL EVERY 12 HOURS
Status: DISCONTINUED | OUTPATIENT
Start: 2017-02-06 | End: 2017-02-08 | Stop reason: HOSPADM

## 2017-02-06 RX ORDER — DEXAMETHASONE SODIUM PHOSPHATE 4 MG/ML
INJECTION, SOLUTION INTRA-ARTICULAR; INTRALESIONAL; INTRAMUSCULAR; INTRAVENOUS; SOFT TISSUE AS NEEDED
Status: DISCONTINUED | OUTPATIENT
Start: 2017-02-06 | End: 2017-02-06 | Stop reason: HOSPADM

## 2017-02-06 RX ORDER — OXYCODONE HCL 10 MG/1
10 TABLET, FILM COATED, EXTENDED RELEASE ORAL EVERY 12 HOURS
Status: COMPLETED | OUTPATIENT
Start: 2017-02-06 | End: 2017-02-07

## 2017-02-06 RX ORDER — OXYCODONE HYDROCHLORIDE 5 MG/1
10 TABLET ORAL
Status: COMPLETED | OUTPATIENT
Start: 2017-02-06 | End: 2017-02-06

## 2017-02-06 RX ORDER — KETOROLAC TROMETHAMINE 30 MG/ML
15 INJECTION, SOLUTION INTRAMUSCULAR; INTRAVENOUS
Status: DISCONTINUED | OUTPATIENT
Start: 2017-02-06 | End: 2017-02-06 | Stop reason: HOSPADM

## 2017-02-06 RX ORDER — SODIUM CHLORIDE 0.9 % (FLUSH) 0.9 %
5-10 SYRINGE (ML) INJECTION AS NEEDED
Status: DISCONTINUED | OUTPATIENT
Start: 2017-02-06 | End: 2017-02-06 | Stop reason: HOSPADM

## 2017-02-06 RX ORDER — SODIUM CHLORIDE 0.9 % (FLUSH) 0.9 %
5-10 SYRINGE (ML) INJECTION AS NEEDED
Status: DISCONTINUED | OUTPATIENT
Start: 2017-02-06 | End: 2017-02-08 | Stop reason: HOSPADM

## 2017-02-06 RX ORDER — TRAMADOL HYDROCHLORIDE 50 MG/1
50 TABLET ORAL
Qty: 60 TAB | Refills: 0 | Status: SHIPPED | OUTPATIENT
Start: 2017-02-06 | End: 2017-07-13 | Stop reason: ALTCHOICE

## 2017-02-06 RX ORDER — CEFAZOLIN SODIUM IN 0.9 % NACL 2 G/50 ML
2 INTRAVENOUS SOLUTION, PIGGYBACK (ML) INTRAVENOUS EVERY 8 HOURS
Status: COMPLETED | OUTPATIENT
Start: 2017-02-06 | End: 2017-02-07

## 2017-02-06 RX ORDER — PREGABALIN 75 MG/1
75 CAPSULE ORAL ONCE
Status: COMPLETED | OUTPATIENT
Start: 2017-02-06 | End: 2017-02-06

## 2017-02-06 RX ORDER — BUPIVACAINE HYDROCHLORIDE 7.5 MG/ML
INJECTION, SOLUTION EPIDURAL; RETROBULBAR AS NEEDED
Status: DISCONTINUED | OUTPATIENT
Start: 2017-02-06 | End: 2017-02-06 | Stop reason: HOSPADM

## 2017-02-06 RX ORDER — SODIUM CHLORIDE 0.9 % (FLUSH) 0.9 %
5-10 SYRINGE (ML) INJECTION EVERY 8 HOURS
Status: DISCONTINUED | OUTPATIENT
Start: 2017-02-07 | End: 2017-02-08 | Stop reason: HOSPADM

## 2017-02-06 RX ORDER — ASPIRIN 325 MG
325 TABLET ORAL 2 TIMES DAILY
Qty: 60 TAB | Refills: 0 | Status: SHIPPED | OUTPATIENT
Start: 2017-02-06 | End: 2018-02-09

## 2017-02-06 RX ORDER — OXYCODONE AND ACETAMINOPHEN 7.5; 325 MG/1; MG/1
1-2 TABLET ORAL
Qty: 70 TAB | Refills: 0 | Status: SHIPPED | OUTPATIENT
Start: 2017-02-06 | End: 2017-07-13 | Stop reason: ALTCHOICE

## 2017-02-06 RX ORDER — LIDOCAINE HYDROCHLORIDE 10 MG/ML
0.1 INJECTION, SOLUTION EPIDURAL; INFILTRATION; INTRACAUDAL; PERINEURAL AS NEEDED
Status: DISCONTINUED | OUTPATIENT
Start: 2017-02-06 | End: 2017-02-06 | Stop reason: HOSPADM

## 2017-02-06 RX ORDER — ACETAMINOPHEN 325 MG/1
975 TABLET ORAL ONCE
Status: COMPLETED | OUTPATIENT
Start: 2017-02-06 | End: 2017-02-06

## 2017-02-06 RX ORDER — AMOXICILLIN 250 MG
1 CAPSULE ORAL 2 TIMES DAILY
Status: DISCONTINUED | OUTPATIENT
Start: 2017-02-06 | End: 2017-02-08 | Stop reason: HOSPADM

## 2017-02-06 RX ORDER — POLYETHYLENE GLYCOL 3350 17 G/17G
17 POWDER, FOR SOLUTION ORAL DAILY
Status: DISCONTINUED | OUTPATIENT
Start: 2017-02-06 | End: 2017-02-08 | Stop reason: HOSPADM

## 2017-02-06 RX ORDER — FENTANYL CITRATE 50 UG/ML
25 INJECTION, SOLUTION INTRAMUSCULAR; INTRAVENOUS
Status: DISCONTINUED | OUTPATIENT
Start: 2017-02-06 | End: 2017-02-06 | Stop reason: HOSPADM

## 2017-02-06 RX ORDER — SODIUM CHLORIDE 9 MG/ML
125 INJECTION, SOLUTION INTRAVENOUS CONTINUOUS
Status: DISPENSED | OUTPATIENT
Start: 2017-02-06 | End: 2017-02-07

## 2017-02-06 RX ORDER — MIDAZOLAM HYDROCHLORIDE 1 MG/ML
INJECTION, SOLUTION INTRAMUSCULAR; INTRAVENOUS AS NEEDED
Status: DISCONTINUED | OUTPATIENT
Start: 2017-02-06 | End: 2017-02-06 | Stop reason: HOSPADM

## 2017-02-06 RX ORDER — SIMVASTATIN 20 MG/1
40 TABLET, FILM COATED ORAL
Status: DISCONTINUED | OUTPATIENT
Start: 2017-02-06 | End: 2017-02-08 | Stop reason: HOSPADM

## 2017-02-06 RX ORDER — TRAMADOL HYDROCHLORIDE 50 MG/1
100 TABLET ORAL
Status: DISCONTINUED | OUTPATIENT
Start: 2017-02-06 | End: 2017-02-08 | Stop reason: HOSPADM

## 2017-02-06 RX ORDER — ACETAMINOPHEN 325 MG/1
650 TABLET ORAL EVERY 6 HOURS
Status: DISCONTINUED | OUTPATIENT
Start: 2017-02-06 | End: 2017-02-08 | Stop reason: HOSPADM

## 2017-02-06 RX ORDER — SODIUM CHLORIDE, SODIUM LACTATE, POTASSIUM CHLORIDE, CALCIUM CHLORIDE 600; 310; 30; 20 MG/100ML; MG/100ML; MG/100ML; MG/100ML
25 INJECTION, SOLUTION INTRAVENOUS CONTINUOUS
Status: DISCONTINUED | OUTPATIENT
Start: 2017-02-06 | End: 2017-02-06 | Stop reason: HOSPADM

## 2017-02-06 RX ORDER — DIPHENHYDRAMINE HYDROCHLORIDE 50 MG/ML
12.5 INJECTION, SOLUTION INTRAMUSCULAR; INTRAVENOUS
Status: DISCONTINUED | OUTPATIENT
Start: 2017-02-06 | End: 2017-02-08 | Stop reason: HOSPADM

## 2017-02-06 RX ORDER — OXYCODONE HYDROCHLORIDE 5 MG/1
5 TABLET ORAL
Status: DISCONTINUED | OUTPATIENT
Start: 2017-02-06 | End: 2017-02-08 | Stop reason: HOSPADM

## 2017-02-06 RX ORDER — ONDANSETRON 8 MG/1
4 TABLET, ORALLY DISINTEGRATING ORAL
Qty: 30 TAB | Refills: 0 | Status: SHIPPED | OUTPATIENT
Start: 2017-02-06 | End: 2017-07-13 | Stop reason: ALTCHOICE

## 2017-02-06 RX ORDER — OXYCODONE HYDROCHLORIDE 5 MG/1
5 TABLET ORAL
Qty: 60 TAB | Refills: 0 | Status: SHIPPED | OUTPATIENT
Start: 2017-02-06 | End: 2017-07-13 | Stop reason: ALTCHOICE

## 2017-02-06 RX ORDER — FACIAL-BODY WIPES
10 EACH TOPICAL DAILY
Qty: 2 SUPPOSITORY | Refills: 0 | Status: SHIPPED | OUTPATIENT
Start: 2017-02-06 | End: 2017-07-13 | Stop reason: ALTCHOICE

## 2017-02-06 RX ORDER — PROPOFOL 10 MG/ML
INJECTION, EMULSION INTRAVENOUS AS NEEDED
Status: DISCONTINUED | OUTPATIENT
Start: 2017-02-06 | End: 2017-02-06 | Stop reason: HOSPADM

## 2017-02-06 RX ORDER — HYDROMORPHONE HYDROCHLORIDE 1 MG/ML
0.5 INJECTION, SOLUTION INTRAMUSCULAR; INTRAVENOUS; SUBCUTANEOUS
Status: ACTIVE | OUTPATIENT
Start: 2017-02-06 | End: 2017-02-07

## 2017-02-06 RX ORDER — ONDANSETRON 2 MG/ML
4 INJECTION INTRAMUSCULAR; INTRAVENOUS
Status: DISCONTINUED | OUTPATIENT
Start: 2017-02-06 | End: 2017-02-08 | Stop reason: HOSPADM

## 2017-02-06 RX ORDER — ASPIRIN 325 MG
325 TABLET, DELAYED RELEASE (ENTERIC COATED) ORAL 2 TIMES DAILY
Status: DISCONTINUED | OUTPATIENT
Start: 2017-02-06 | End: 2017-02-08 | Stop reason: HOSPADM

## 2017-02-06 RX ORDER — CEFAZOLIN SODIUM IN 0.9 % NACL 2 G/50 ML
2 INTRAVENOUS SOLUTION, PIGGYBACK (ML) INTRAVENOUS ONCE
Status: COMPLETED | OUTPATIENT
Start: 2017-02-06 | End: 2017-02-06

## 2017-02-06 RX ORDER — FACIAL-BODY WIPES
10 EACH TOPICAL DAILY PRN
Status: DISCONTINUED | OUTPATIENT
Start: 2017-02-08 | End: 2017-02-08 | Stop reason: HOSPADM

## 2017-02-06 RX ORDER — FENTANYL CITRATE 50 UG/ML
INJECTION, SOLUTION INTRAMUSCULAR; INTRAVENOUS AS NEEDED
Status: DISCONTINUED | OUTPATIENT
Start: 2017-02-06 | End: 2017-02-06 | Stop reason: HOSPADM

## 2017-02-06 RX ORDER — FAMOTIDINE 20 MG/1
20 TABLET, FILM COATED ORAL 2 TIMES DAILY
Status: DISCONTINUED | OUTPATIENT
Start: 2017-02-06 | End: 2017-02-08 | Stop reason: HOSPADM

## 2017-02-06 RX ORDER — PROPOFOL 10 MG/ML
INJECTION, EMULSION INTRAVENOUS
Status: DISCONTINUED | OUTPATIENT
Start: 2017-02-06 | End: 2017-02-06 | Stop reason: HOSPADM

## 2017-02-06 RX ORDER — SODIUM CHLORIDE, SODIUM LACTATE, POTASSIUM CHLORIDE, CALCIUM CHLORIDE 600; 310; 30; 20 MG/100ML; MG/100ML; MG/100ML; MG/100ML
1000 INJECTION, SOLUTION INTRAVENOUS CONTINUOUS
Status: DISCONTINUED | OUTPATIENT
Start: 2017-02-06 | End: 2017-02-06 | Stop reason: HOSPADM

## 2017-02-06 RX ADMIN — CEFAZOLIN 2 G: 1 INJECTION, POWDER, FOR SOLUTION INTRAMUSCULAR; INTRAVENOUS; PARENTERAL at 07:32

## 2017-02-06 RX ADMIN — MIDAZOLAM HYDROCHLORIDE 2 MG: 1 INJECTION, SOLUTION INTRAMUSCULAR; INTRAVENOUS at 07:08

## 2017-02-06 RX ADMIN — Medication 1 TABLET: at 12:15

## 2017-02-06 RX ADMIN — FENTANYL CITRATE 50 MCG: 50 INJECTION, SOLUTION INTRAMUSCULAR; INTRAVENOUS at 07:08

## 2017-02-06 RX ADMIN — BUPIVACAINE HYDROCHLORIDE 2.6 ML: 7.5 INJECTION, SOLUTION EPIDURAL; RETROBULBAR at 07:18

## 2017-02-06 RX ADMIN — OXYCODONE HYDROCHLORIDE 10 MG: 10 TABLET, FILM COATED, EXTENDED RELEASE ORAL at 21:22

## 2017-02-06 RX ADMIN — ACETAMINOPHEN 975 MG: 325 TABLET ORAL at 06:57

## 2017-02-06 RX ADMIN — DEXAMETHASONE SODIUM PHOSPHATE 4 MG: 4 INJECTION, SOLUTION INTRA-ARTICULAR; INTRALESIONAL; INTRAMUSCULAR; INTRAVENOUS; SOFT TISSUE at 09:00

## 2017-02-06 RX ADMIN — OXYCODONE HYDROCHLORIDE 10 MG: 5 TABLET ORAL at 18:07

## 2017-02-06 RX ADMIN — MIDAZOLAM HYDROCHLORIDE 2 MG: 1 INJECTION, SOLUTION INTRAMUSCULAR; INTRAVENOUS at 07:43

## 2017-02-06 RX ADMIN — ASPIRIN 325 MG: 325 TABLET, DELAYED RELEASE ORAL at 17:24

## 2017-02-06 RX ADMIN — CEFAZOLIN 2 G: 1 INJECTION, POWDER, FOR SOLUTION INTRAMUSCULAR; INTRAVENOUS; PARENTERAL at 13:23

## 2017-02-06 RX ADMIN — SODIUM CHLORIDE 1500 ML: 900 IRRIGANT IRRIGATION at 08:08

## 2017-02-06 RX ADMIN — LIDOCAINE HYDROCHLORIDE 0.1 ML: 10 INJECTION, SOLUTION EPIDURAL; INFILTRATION; INTRACAUDAL; PERINEURAL at 06:59

## 2017-02-06 RX ADMIN — SODIUM CHLORIDE, SODIUM LACTATE, POTASSIUM CHLORIDE, AND CALCIUM CHLORIDE: 600; 310; 30; 20 INJECTION, SOLUTION INTRAVENOUS at 07:58

## 2017-02-06 RX ADMIN — FAMOTIDINE 20 MG: 20 TABLET, FILM COATED ORAL at 17:24

## 2017-02-06 RX ADMIN — SODIUM CHLORIDE 125 ML/HR: 900 INJECTION, SOLUTION INTRAVENOUS at 18:07

## 2017-02-06 RX ADMIN — CEFAZOLIN 2 G: 1 INJECTION, POWDER, FOR SOLUTION INTRAMUSCULAR; INTRAVENOUS; PARENTERAL at 21:22

## 2017-02-06 RX ADMIN — ACETAMINOPHEN 650 MG: 325 TABLET ORAL at 12:15

## 2017-02-06 RX ADMIN — SODIUM CHLORIDE, SODIUM LACTATE, POTASSIUM CHLORIDE, AND CALCIUM CHLORIDE 1000 ML: 600; 310; 30; 20 INJECTION, SOLUTION INTRAVENOUS at 06:59

## 2017-02-06 RX ADMIN — PROPOFOL 17 MG: 10 INJECTION, EMULSION INTRAVENOUS at 07:45

## 2017-02-06 RX ADMIN — MELATONIN TAB 3 MG 4.5 MG: 3 TAB at 21:22

## 2017-02-06 RX ADMIN — FAMOTIDINE 20 MG: 20 TABLET, FILM COATED ORAL at 12:15

## 2017-02-06 RX ADMIN — OXYCODONE HYDROCHLORIDE 5 MG: 5 TABLET ORAL at 21:22

## 2017-02-06 RX ADMIN — CELECOXIB 200 MG: 100 CAPSULE ORAL at 06:57

## 2017-02-06 RX ADMIN — SODIUM CHLORIDE 125 ML/HR: 900 INJECTION, SOLUTION INTRAVENOUS at 10:03

## 2017-02-06 RX ADMIN — CELECOXIB 200 MG: 100 CAPSULE ORAL at 18:06

## 2017-02-06 RX ADMIN — SODIUM CHLORIDE, SODIUM LACTATE, POTASSIUM CHLORIDE, AND CALCIUM CHLORIDE: 600; 310; 30; 20 INJECTION, SOLUTION INTRAVENOUS at 09:17

## 2017-02-06 RX ADMIN — PROPOFOL 75 MCG/KG/MIN: 10 INJECTION, EMULSION INTRAVENOUS at 07:45

## 2017-02-06 RX ADMIN — PREGABALIN 75 MG: 75 CAPSULE ORAL at 06:57

## 2017-02-06 RX ADMIN — Medication 1 TABLET: at 17:24

## 2017-02-06 RX ADMIN — SIMVASTATIN 40 MG: 20 TABLET, FILM COATED ORAL at 21:21

## 2017-02-06 RX ADMIN — OXYCODONE HYDROCHLORIDE 10 MG: 5 TABLET ORAL at 10:03

## 2017-02-06 RX ADMIN — TRAMADOL HYDROCHLORIDE 100 MG: 50 TABLET, FILM COATED ORAL at 15:11

## 2017-02-06 RX ADMIN — ASPIRIN 325 MG: 325 TABLET, DELAYED RELEASE ORAL at 12:15

## 2017-02-06 RX ADMIN — ACETAMINOPHEN 650 MG: 325 TABLET ORAL at 17:24

## 2017-02-06 RX ADMIN — OXYCODONE HYDROCHLORIDE 10 MG: 10 TABLET, FILM COATED, EXTENDED RELEASE ORAL at 06:57

## 2017-02-06 RX ADMIN — HYDROCHLOROTHIAZIDE 25 MG: 25 TABLET ORAL at 12:15

## 2017-02-06 NOTE — OP NOTES
OPERATIVE REPORT     Admit Date: 2/6/2017  Admit Diagnosis: RIGHT HIP OA  Primary localized osteoarthritis of left hip  Preoperative Diagnosis: LEFT HIP OA  Postoperative Diagnosis: LEFT HIP OA    Procedure: Procedure(s):  LEFT TOTAL HIP ARTHROPLASTY DIRECT ANTERIOR APPROACH   Surgeon: Flavio Dill MD  Assistant(s): Ramiro Gomez  Anesthesia: Spinal   Estimated Blood Loss: 200cc  Specimens: * No specimens in log *   Complications: None        INDICATIONS:    The patient is a 69 y.o., female who has complained of a long history of left hip pain / groin pain. The patient has failed conservative treatment to include medical management / therapy and presents for definitive operative care. Informed consent was obtained including a discussion of the risks and benefits, which include, but are not limited to, bleeding, infection, neurovascular damage, wound complications, pain and stiffness in the hip, periprosthetic loosening, fracture, dislocation and venous thrombo-embolic disease, the patient consented for the procedure. DESCRIPTION OF PROCEDURE:       The proper side and hip were identified and signed in the preoperative holding area. All questions were answered. After adequate anesthesia, the patient was transferred to the hana table and all bony prominence were well padded. The hip was prepped and draped in sterile fashion. The patient was positioned supine on the operating room table. A direct anterior approach was used through skin and the tensor fascia. The interval between the Tensor Fascia and Sartorius was used and retractors were placed in an atraumatic fashion. The lateral femoral circumflex artery and vein were identified and coagulated. The proximal and distal capsule was identified and the anterior capsule excised. A standard neck cut was made according to the implant geometry along with a separate cut just below the articular surface to create a small napkin ring.  The bone was removed along with the femoral head. Further soft tissue was debrided. Acetabular exposure was then obtained and the labrum was excised along with the foveal contents. Reaming in an anatomic position was then performed starting at 45mm reamer up to the final reamer size. Osteophytes were removed at this point. The acetabulum was copiously irrigated and then the final cup was impacted in place. A liner for the appropriate head size was also impacted in place. Stability of the cup and liner were assessed. The femur was then exposed, residual soft tissue was removed and the box osteotome was used along with the entry reamer to open the canal. The limb was appropriately positioned on the hana table Sequential broaching was started with the zero broach and broached up to the final implant size. Fluoroscopy was used at this time to verify cup inclination, version and the femoral broach size as well as leg lengths. The final implant placed. A final trial was performed to assess leg length and verified fluoroscopically. The final femoral head was then impacted in place. The wound was copiously irrigated and the final stem was placed along with the head which was impacted in place. Stability and leg lengths were assessed again and verified fluroscopically. The final implants were irrigated. The interval between the tensor and Sartorius was closed with 0-Vicryl, the sub-Q with 2-0 Vicryl, 4-0 monocryl and dermabond. A sterile dressing was applied. The patient was awoken from anesthesia and taken to the recovery room in a stable condiition. OPERATIVE FINDINGS : Severe left hip OA    IMPLANTS :     Implant Name Type Inv.  Item Serial No.  Lot No. LRB No. Used Action   CLUSTER HOLE SHELL   NA [facility] [id number] Left 1 Implanted   INSERT ACET 0DEG 36MM E X3 -- TRIDENT - SNA  INSERT ACET 0DEG 36MM E X3 -- TRIDENT NA [employer] [facility] [id number] Left 1 Implanted   132 degree NECK ANGLE HIP STEM   NA PITO [facility] [employer] [id number] Left 1 Implanted   HEAD FEM DELT V40 -5MM NK 36MM -- V40 BIOLOX - SNA   HEAD FEM DELT V40 -5MM NK 36MM -- V40 BIOLOX NA [employer] HOW [id number] Left 1 Implanted       JUSTIFICATION FOR SURGICAL ASSISTANT:   Surgical Assistant, Ramiro Gomez (LAVON) was requried and necessary in this case, to help with soft tissue retraction, extremity positioning, equiment management, implant management, and wound closure.     Flavio Dill MD

## 2017-02-06 NOTE — ROUTINE PROCESS
TRANSFER - OUT REPORT:    Verbal report given to Niyah Landin RN(name) on [facility]  being transferred to 4th floor Orthopedic(unit) for routine post - op       Report consisted of patients Situation, Background, Assessment and   Recommendations(SBAR). Information from the following report(s) SBAR, Kardex, Intake/Output, MAR and Recent Results was reviewed with the receiving nurse. Lines:   Peripheral IV 04/13/16 Left Wrist (Active)       Peripheral IV 02/06/17 Left Wrist (Active)   Site Assessment Clean, dry, & intact 2/6/2017  9:59 AM   Phlebitis Assessment 0 2/6/2017  9:59 AM   Infiltration Assessment 0 2/6/2017  9:59 AM   Dressing Status Clean, dry, & intact 2/6/2017  9:59 AM   Dressing Type Transparent;Tape 2/6/2017  9:59 AM   Hub Color/Line Status Pink; Infusing 2/6/2017  9:59 AM   Alcohol Cap Used Yes 2/6/2017  9:59 AM        Opportunity for questions and clarification was provided.       Patient transported with:   Registered Nurse

## 2017-02-06 NOTE — IP AVS SNAPSHOT
[address] [place] [address] 
[phone] Patient: Jimmie Frankel MRN: [id number] DKM:1[birth year]30 You are allergic to the following No active allergies Recent Documentation Height Weight Breastfeeding? BMI OB Status Smoking Status 1.676 m 91.2 kg No 32.44 kg/m2 Postmenopausal Never Smoker Emergency Contacts Name Discharge Info Relation Home Work Mobile Leta Huston/Marco DISCHARGE CAREGIVER [3] Sister [23] [phone] Kanika Crowell N/A  AT THIS TIME [6] Neighbor [4] About your hospitalization You were admitted on:  February 6, 2017 You last received care in the:  [facility] 4M POST SURG ORT 1 You were discharged on:  February 8, 2017 Unit phone number:  [phone] Why you were hospitalized Your primary diagnosis was:  Not on File Your diagnoses also included:  Primary Localized Osteoarthritis Of Left Hip Providers Seen During Your Hospitalizations Provider Role Specialty Primary office phone Vargas Campbell MD Attending Provider Orthopedic Surgery [phone] Your Primary Care Physician (PCP) Primary Care Physician Office Phone Office Fax [place], [address] [phone] Follow-up Information Follow up With Details Comments Contact Info Vargas Campbell MD On 2/21/2017 8:45 a.m.  [address] [facility] [address] [place] [address] 
[phone] [address] [place] [address] 
[phone] Dayton Dewitt MD   [place] [address] [address] [facility] [address] 
[phone] Current Discharge Medication List  
  
START taking these medications Dose & Instructions Dispensing Information Comments Morning Noon Evening Bedtime  
 bisacodyl 10 mg suppository Commonly known as:  DULCOLAX (BISACODYL) Your next dose is: Today, Tomorrow Other:  _________ Dose:  10 mg Insert 10 mg into rectum daily. Quantity:  2 Suppository Refills:  0  
     
   
   
   
  
 ondansetron 8 mg disintegrating tablet Commonly known as:  ZOFRAN ODT Your next dose is: Today, Tomorrow Other:  _________ Dose:  4 mg Take 0.5 Tabs by mouth every eight (8) hours as needed for Nausea. Quantity:  30 Tab Refills:  0  
     
   
   
   
  
 oxyCODONE IR 5 mg immediate release tablet Commonly known as:  Stana Jennifer Your next dose is: Today, Tomorrow Other:  _________ Dose:  5 mg Take 1 Tab by mouth every eight (8) hours as needed for Pain. Max Daily Amount: 15 mg. Quantity:  60 Tab Refills:  0  
     
   
   
   
  
 oxyCODONE-acetaminophen 7.5-325 mg per tablet Commonly known as:  PERCOCET 7.5 Your next dose is: Today, Tomorrow Other:  _________ Dose:  1-2 Tab Take 1-2 Tabs by mouth every four (4) hours as needed for Pain. Max Daily Amount: 12 Tabs. Quantity:  70 Tab Refills:  0  
     
   
   
   
  
 traMADol 50 mg tablet Commonly known as:  ULTRAM  
   
Your next dose is: Today, Tomorrow Other:  _________ Dose:  50 mg Take 1 Tab by mouth every six (6) hours as needed for Pain. Max Daily Amount: 200 mg. Quantity:  60 Tab Refills:  0 CONTINUE these medications which have CHANGED Dose & Instructions Dispensing Information Comments Morning Noon Evening Bedtime * aspirin delayed-release 81 mg tablet What changed:  Another medication with the same name was added. Make sure you understand how and when to take each. Your next dose is: Today, Tomorrow Other:  _________ Dose:  81 mg  
take 81 mg by mouth daily. Refills:  0  
     
   
   
   
  
 * aspirin 325 mg tablet Commonly known as:  ASPIRIN What changed: You were already taking a medication with the same name, and this prescription was added. Make sure you understand how and when to take each. Your next dose is: Today, Tomorrow Other:  _________ Dose:  325 mg Take 1 Tab by mouth two (2) times a day. Quantity:  60 Tab Refills:  0  
     
   
   
   
  
 * Notice: This list has 2 medication(s) that are the same as other medications prescribed for you. Read the directions carefully, and ask your doctor or other care provider to review them with you. CONTINUE these medications which have NOT CHANGED Dose & Instructions Dispensing Information Comments Morning Noon Evening Bedtime  
 b complex vitamins tablet Your next dose is: Today, Tomorrow Other:  _________ Dose:  1 Tab Take 1 Tab by mouth daily. Refills:  0  
     
   
   
   
  
 glucosam-chond-msm-boron-hyal 661-05.0-072-0 mg Tab Your next dose is: Today, Tomorrow Other:  _________ Dose:  1 Tab Take 1 Tab by mouth daily. Refills:  0  
     
   
   
   
  
 hydroCHLOROthiazide 25 mg tablet Commonly known as:  HYDRODIURIL Your next dose is: Today, Tomorrow Other:  _________ Dose:  25 mg Take 1 Tab by mouth daily for 30 days. Quantity:  30 Tab Refills:  5  
     
   
   
   
  
 ibuprofen 800 mg tablet Commonly known as:  MOTRIN Your next dose is: Today, Tomorrow Other:  _________ Dose:  800 mg Take 800 mg by mouth every six (6) hours as needed for Pain. Indications: OSTEOARTHRITIS Refills:  0  
     
   
   
   
  
 melatonin Tab tablet Your next dose is: Today, Tomorrow Other:  _________ Dose:  5 mg Take 5 mg by mouth nightly. Indications: 2 tabltes Refills:  0  
     
   
   
   
  
 multivitamin tablet Commonly known as:  ONE A DAY  
   
 Your next dose is: Today, Tomorrow Other:  _________ Dose:  1 Tab Take 1 Tab by mouth daily. Refills:  0  
     
   
   
   
  
 nicotinic acid 500 mg tablet Commonly known as:  NIACIN Your next dose is: Today, Tomorrow Other:  _________ Dose:  500 mg Take 500 mg by mouth Daily (before breakfast). Refills:  0 OMEGA 3 PO Your next dose is: Today, Tomorrow Other:  _________ Dose:  1 Cap Take 1 Cap by mouth daily. Refills:  0  
     
   
   
   
  
 OTHER Your next dose is: Today, Tomorrow Other:  _________ Juice plus for vegetable and fruit supplementation Refills:  0  
     
   
   
   
  
 simvastatin 40 mg tablet Commonly known as:  ZOCOR Your next dose is: Today, Tomorrow Other:  _________ Dose:  40 mg Take 1 Tab by mouth nightly. Quantity:  30 Tab Refills:  5 STOP taking these medications HYDROcodone-acetaminophen 7.5-325 mg per tablet Commonly known as:  Daily Paredes Where to Get Your Medications Information on where to get these meds will be given to you by the nurse or doctor. ! Ask your nurse or doctor about these medications  
  aspirin 325 mg tablet  
 bisacodyl 10 mg suppository  
 ondansetron 8 mg disintegrating tablet  
 oxyCODONE IR 5 mg immediate release tablet  
 oxyCODONE-acetaminophen 7.5-325 mg per tablet  
 traMADol 50 mg tablet Discharge Instructions TOTAL HIP DISCHARGE INSTRUCTIONS Patient: Jermain Lenz MRN: [id number] SSN: [id number] Please take the time to review the following instructions before you leave the hospital and use them as guidelines during your recovery from surgery. If you have any questions you may contact my office at [phone]. After hours or during the weekend you may reach me personally at [phone] if there is an emergency. SPECIAL INSTRUCTIONS :  
1. Do not bend greater than 90 degrees at the hip for 4 weeks following your discharge 2. Avoid exercises or activities which bring the leg out or away from the mid-line of the body. The surgical repair involves this muscle and it will require 4 weeks to heal. You may disregard these instructions for a direct anterior approach. 3. You may walk as tolerated and are encouraged to work daily on progressing your activities with a walker initially. 4. You may transition to a cane for walking 5-7 days from surgery once you feel safe. You may use a walker for longer periods if you feel unstable. Wound Care/ Dressing Changes: DRESSING :  
 
 
 
Honey Comb Dressing : Please leave this dressing in place until your follow-up visit with Dr. Augustine Yusuf. If your dressing becomes saturated or begins to peel off it can be removed and a clean dressing applied. Replacement dressing options can be purchased over the counter at a local pharmacy or medical supply vendor if needed. A porous adhesive dressing such as above can be purchased at a local [facility] or [employer]. Showering/ Bathing: If your incision is dry without drainage you may shower following your discharge home. Please change your dressing if it becomes saturated after showering or begins to peel off. It is fine to have water run over the incision. Do not vigorously scrub your incision. Apply a clean, dry dressing after you have dried your incision, if your dressing peels off. Do not take a bath or get into a swimming pool / Natha Eves until you follow up with Dr. Augustine Yusuf. Do not soak your incision under water. If there is continued drainage or you are concerned contact Dr Hazel Stout office prior to showering [phone] ext [phone]. Diet: 
You may advance to your regular diet as tolerated.   Increase your clear liquid intake for the next 2-3 days. Nutrition Recommendations for Discharge: 
 
Continue Oral Nutrition Supplements at discharge: Ensure Enlive or Ensure Plus 1-2 bottles/day for 30 days unless otherwise directed by your Primary Care Physician. This product can be purchased at your local grocery store or drug store and online. Jackie Blount, RD 
 _ Medication: 
 
 
1. You will be given prescriptions for pain medication when you are discharged from the hospital. The side effects of these medications can be substantial and the narcotic medications are not mandatory. You may substitute these medications with Tylenol or Alleve / Motrin. 2.  Please use the medications as prescribed. Pain medications may cause constipation- Colace twice daily and Miralax two scoops 2-3 times a day while taking the narcotic medication should help prevent constipation. Other possible side effects of pain medication are dizziness, headache, nausea, vomiting, and urinary retention. Discontinue the pain medication if you develop itching, rash, shortness of breath, or difficulties swallowing. If these symptoms become severe or are not relieved by discontinuing the medication, you should seek immediate medical attention. 3. Refills of pain medication are authorized during office hours only (8 AM- 5 PM  Monday thru Friday). Many of these medication will require you or a family member to pick-up a physical prescription at the office. 4. Medications other than antiinflammatories will not be called into the pharmacy after business hours. 5. Do not take Tylenol/Acetaminophen in addition to your pain medication as most pain medications already contain this ingredient. Do not exceed 4000mg of Tylenol/Acetaminophen per day. 6. You may resume the medication(s) you were taking prior to your surgery. Narcotics may change the effects of some antidepressant medication(s).   If you have any questions about possible interactions between your regular medications and the pain medication, you should ask the pharmacist or contact the prescribing physician. 7. You will be discharged with prescriptions for additional pain medications (Tramadol or Toradol) and a medication for nausea and vomiting (Phenergan). You only need to fill these prescriptions if the primary pain medication is not working or you experiencing post-op nausea. 8. If you have constipation which is not improved by oral stool softeners then a Ducolax suppository should be purchased over the counter. 9. Continue the blood thinner (Aspirin or Lovenox) for a total of 30 days following surgery. Follow up appointment: 
 
Please call our office at [phone] for your follow up appointment. This should be scheduled 14 days following the date of surgery. Physical Therapy / Nursing: 
Physical Therapy following surgery will be arranged at home along with at home nursing care. They have specific instructions for rehab and wound care. .  
 
Returning to work: 
 
Normal return to work is 3-12 weeks following total hip replacement. Depending on your progression following surgery and specific job duties you may take longer for a full return to work. DRIVING You should not return to driving until you are off all narcotic pain medications and able to safely and quickly apply the brakes. This is normally 3-6 weeks for left hips and 4-8 weeks for right hip. Important Signs and Symptoms: 
 
If any of the following signs or symptoms occur, you should contact Dr. Manuel Moralez office.   Please be advised if a problem arises which you feel requires immediate medical attention or you are unable to contact Dr. Manuel Moralez office you should seek immediate medical attention at the ER or other health care facility you have access to. 
 
1. A sudden increase in swelling and/or redness or warmth at the area your surgery was performed which isnt relieved by rest, ice, and elevation. 2. Oral temperature greater than 101 degrees for 12 hours or more which isnt relieved by an increase in fluid intake and taking 2 Tylenol every 4-6 hours. 3. Excessive drainage from your incisions, or drainage which hasnt stopped by 72 hours after your surgery. 4. Fever, chills, shortness of breath, chest pain, nausea, vomiting or other signs and symptoms which are of concern to you. frequently asked questions ? What should I take for pain? 
o In general you will be discharged with three medications for pain (Percocet 7.5 / 325mg, Oxycodone 5mg and Tramadol). This may vary slightly depending on what you were taking in the hospital.  
? 1st Line  Norco 1-2 tablets every 4-6 hrs (Or as directed). ? This is the first and only medication you need to take. Initially you may need 2 tablets every 4 hours, but as your pain subsides, this will space out and taper to 1 tablet every 6 hours. ? 2nd Line  Tramadol  Take this medication as directed if the Percocet and Oxycodone are not working. Once you taper off Percocet, this medication may also be used. ? 3rd Line  Add Alleve 220mg every 12 hours or Motrin 400mg (200mg x 2) every 8 hours ? When should I call for advice regarding my pain? 
o After 12 hours on the above regimen, if nothing is working call the office ([phone] or [phone]) or call my cell after hours [phone]. 
 
? Can I get refills? 
o Narcotic refills are provided for the first 6 weeks following surgery. ? I will generally try to taper down to a single narcotic medication by your two week appointment. o Try Tylenol 650mg along with Alleve 220mg or Motrin 200mg during the majority of the day. ? Save the narcotic pain medications for physical therapy (1 hour prior) and before sleeping at night. ? Keep in mind you need to discontinue these medications prior to returning to driving. ? Is swelling normal? 
o Almost everyone has some degree of swelling following surgery. o Following hip and knee replacement surgery, swelling can be normal below the incision for the first 1-2 weeks. ? This swelling peaks around 5-7 days after surgery. ? You may have some bruising around the back of the thigh, calf and even into the foot. ? What should I do for the swelling? 
o Keep the limb elevated. o Apply compression socks (knee high for total knees and up to the mid-thigh for total hips.  
o Heat or ice may be applied, choose the modality that makes you the most comfortable. ? How long should I remain on blood thinners following surgery? 
o Thirty days ? When can I drive? 
o Once you have stopped using regular narcotic pain medications (Percocet, Lortab, etc.) and can safely apply the brakes without hesitation. ? When can I shower? o 72hours following surgery if you have no drainage 
o No submersion of the incision, bathing or swimming for 14 days following surgery or until cleared by Dr Jorge Bravo. ? What do I do with the dressing when I shower? 
o The dressing can be worn in the shower. Please remove the dressing only if it becomes saturated or begins to peel off.   
o The incision is sealed with Dermabond (Biologic glue) and except for wounds which are draining should be watertight. ? How active should I be following surgery? o Progress activities in moderation at your own pace.  
o Walk each day and set progressive goals with small increments (1st week  ½ block of walking, 2nd week  1 block, 3rd week  2 blocks, etc.) Please do not hesitate to call me at [phone] (cell phone) for questions following surgery - Ramos Lopez MD 
 
[employer] Activation Thank you for enrolling in [address] [facility]. Please follow the instructions below to securely access your online medical record.  [employer] allows you to send messages to your doctor, view your test results, renew your prescriptions, schedule appointments, and more. How Do I Sign Up? 1. In your internet browser, go to [email or web address]. 2. Click on the First Time User? Click Here link in the Sign In box. You will see the New Member Sign Up page. 3. Enter your Vettro Access Code exactly as it appears below. You will not need to use this code after youve completed the sign-up process. If you do not sign up before the expiration date, you must request a new code. [employer] Access Code: Activation code not generated Current Vettro Status: Patient Declined 4. Enter the last four digits of your Social Security Number (xxxx) and Date of Birth (mm/dd/yyyy) as indicated and click Submit. You will be taken to the next sign-up page. 5. Create a [employer] ID. This will be your Vettro login ID and cannot be changed, so think of one that is secure and easy to remember. 6. Create a Vettro password. You can change your password at any time. 7. Enter your Password Reset Question and Answer. This can be used at a later time if you forget your password. 8. Enter your e-mail address. You will receive e-mail notification when new information is available in [address] 9. Click Sign Up. You can now view your medical record. Additional Information Remember, Vettro is NOT to be used for urgent needs. For medical emergencies, dial 911. Now available from your iPhone and Android! Discharge Orders None Vettro Announcement We are excited to announce that we are making your provider's discharge notes available to you in Vettro. You will see these notes when they are completed and signed by the physician that discharged you from your recent hospital stay. If you have any questions or concerns about any information you see in Vettro, please call the Health Information Department where you were seen or reach out to your Primary Care Provider for more information about your plan of care. Introducing hospitals & HEALTH SERVICES! Dear Ronan Lund: Thank you for requesting a [employer] account. Our records indicate that you already have an active [employer] account. You can access your account anytime at [email or web address] Did you know that you can access your hospital and ER discharge instructions at any time in [employer]? You can also review all of your test results from your hospital stay or ER visit. Additional Information If you have questions, please visit the Frequently Asked Questions section of the [employer] website at [email or web address] Remember, [employer] is NOT to be used for urgent needs. For medical emergencies, dial 911. Now available from your iPhone and Android! General Information Please provide this summary of care documentation to your next provider. Patient Signature:  ____________________________________________________________ Date:  ____________________________________________________________  
  
Magy Sr Provider Signature:  ____________________________________________________________ Date:  ____________________________________________________________

## 2017-02-06 NOTE — ANESTHESIA POSTPROCEDURE EVALUATION
Post-Anesthesia Evaluation and Assessment    Patient: Olaf Acevedo MRN: [id number]  SSN: [id number]    YOB: 1950  Age: 69 y.o. Sex: female       Cardiovascular Function/Vital Signs  Visit Vitals    /50    Pulse 74    Temp 36.3 °C (97.3 °F)    Resp 18    Ht 5' 6\" (1.676 m)    Wt 84.8 kg (186 lb 15.2 oz)    SpO2 100%    BMI 30.17 kg/m2       Patient is status post spinal anesthesia for Procedure(s):  LEFT TOTAL HIP ARTHROPLASTY DIRECT ANTERIOR APPROACH . Nausea/Vomiting: None    Postoperative hydration reviewed and adequate. Pain:  Pain Scale 1: Numeric (0 - 10) (02/06/17 0933)  Pain Intensity 1: 0 (02/06/17 0933)   Managed    Neurological Status:   Neuro (WDL): Exceptions to WDL (02/06/17 0933)  Neuro  Neurologic State: Alert (02/06/17 6917)  Orientation Level: Oriented X4 (02/06/17 3232)  Cognition: Appropriate decision making; Appropriate for age attention/concentration; Appropriate safety awareness; Follows commands (02/06/17 0933)  Speech: Clear (02/06/17 0933)  LUE Motor Response: Purposeful (02/06/17 0933)  LLE Motor Response: Pharmacologically paralyzed (spinal anesthesia) (02/06/17 0933)  RUE Motor Response: Purposeful (02/06/17 0933)  RLE Motor Response: Pharmacologically paralyzed (spinal anesthesia) (02/06/17 0933)   At baseline    Mental Status and Level of Consciousness: Arousable    Pulmonary Status:   O2 Device: Nasal cannula (02/06/17 0935)   Adequate oxygenation and airway patent    Complications related to anesthesia: None    Post-anesthesia assessment completed.  No concerns    Signed By: Chuck Iyer MD     February 6, 2017

## 2017-02-06 NOTE — PROGRESS NOTES
2/6/2017 2:39 PM Order for RW received and sent to [employer] via [employer]. 2/6/2017 11:19 AM Case management consult for home health received. Met with pt. Charted address and phone number confirmed. Pt lives alone in a 2 story [place] There are 3 steps to enter and 14 steps to pt's bedroom. Pt will have her sister at home after discharge to assist. Pt has rx coverage and fills her scripts at [facility].  Pt has no history of HH, choice given, [facility] selected. formerly [facility] referral was sent to [facility] via [address]. Pt owns a cane and raised toilet seat. Pt is borrowing a rollator, need for RW discussed. [employer] selected as preferred DME agency. Pt's sister will transport pt home. CM will follow for additional discharge needs. JAMARCUS Shepherd   Care Management Interventions  PCP Verified by CM: Yes (Vivek Johnny )  Mode of Transport at Discharge:  Other (see comment) (Pt's sister, GINNY )  Transition of Care Consult (CM Consult): [address]: Yes  MyChart Signup: No  Discharge Durable Medical Equipment: No  Physical Therapy Consult: Yes  Occupational Therapy Consult: Yes  Speech Therapy Consult: No  Current Support Network: Own Home, Other  Confirm Follow Up Transport: Family  Plan discussed with Pt/Family/Caregiver: Yes  Freedom of Choice Offered: Yes  Discharge Location  Discharge Placement: Home with home health

## 2017-02-06 NOTE — ANESTHESIA PREPROCEDURE EVALUATION
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

## 2017-02-06 NOTE — INTERVAL H&P NOTE
H&P Update: Kari Lu was seen and examined. History and physical has been reviewed. The patient has been examined.  There have been no significant clinical changes since the completion of the originally dated History and Physical.    Signed By: Robbin Viera MD     February 6, 2017 5:36 AM

## 2017-02-06 NOTE — ANESTHESIA PROCEDURE NOTES
Spinal Block    Start time: 2/6/2017 7:08 AM  End time: 2/6/2017 7:18 AM  Performed by: Nimesh Phillip  Authorized by: Trung Peterson     Pre-procedure:   Indications: at surgeon's request and primary anesthetic  Preanesthetic Checklist: patient identified, risks and benefits discussed, anesthesia consent, site marked, patient being monitored and timeout performed    Timeout Time: 07:08          Spinal Block:   Patient Position:  Seated  Prep Region:  Lumbar  Prep: DuraPrep      Location:  L3-4  Technique:  Single shot        Needle:   Needle Type:  Pencan  Needle Gauge:  25 G  Attempts:  1      Events: CSF confirmed, no blood with aspiration and no paresthesia        Assessment:  Insertion:  Uncomplicated  Patient tolerance:  Patient tolerated the procedure well with no immediate complications

## 2017-02-06 NOTE — PROGRESS NOTES
Problem: Mobility Impaired (Adult and Pediatric)  Goal: *Acute Goals and Plan of Care (Insert Text)  Physical Therapy Goals  Initiated 2/6/2017    1. Patient will move from supine to sit and sit to supine in bed with independence within 4 days. 2. Patient will perform sit to stand with modified independence within 4 days. 3. Patient will ambulate with modified independence for 100 feet with the least restrictive device within 4 days. 4. Patient will ascend/descend 4 stairs with 1 handrail(s) with minimal assistance/contact guard assist within 4 days. 5. Patient will verbalize and demonstrate understanding of anterior hip precautions per protocol within 4 days. 6. Patient will perform total hip home exercise program per protocol with independence within 4 days. PHYSICAL THERAPY EVALUATION  Patient: Radu Fields (16 y.o. female)  Date: 2/6/2017  Primary Diagnosis: RIGHT HIP OA  Primary localized osteoarthritis of left hip  Procedure(s) (LRB):  LEFT TOTAL HIP ARTHROPLASTY DIRECT ANTERIOR APPROACH  (Left) Day of Surgery   Precautions:   WBAT, Fall, Total hip (anterior approach)      ASSESSMENT :  Based on the objective data described below, the patient presents with decreased ROM and strength to LLE, decreased bed mobility, transfers and functional ambulation following left THR, anterior approach. She was educated regarding the anterior hip precautions, a handout given and she expressed understanding. She was able to perform bed exercises, then stood and ambulated 6 feet with rolling walker to bedside commode and back with minimal assist of 2. Vitals stable. She lives alone in a two story town home but her sister will assist her upon discharge. She will need a rolling walker and HHPT upon discharge. Patient will benefit from skilled intervention to address the above impairments.   Patients rehabilitation potential is considered to be Good  Factors which may influence rehabilitation potential include:   [X] None noted  [ ]         Mental ability/status  [ ]         Medical condition  [ ]         Home/family situation and support systems  [ ]         Safety awareness  [ ]         Pain tolerance/management  [ ]         Other:        PLAN :  Recommendations and Planned Interventions:  [X]           Bed Mobility Training             [ ]    Neuromuscular Re-Education  [X]           Transfer Training                   [ ]    Orthotic/Prosthetic Training  [X]           Gait Training                         [ ]    Modalities  [X]           Therapeutic Exercises           [ ]    Edema Management/Control  [ ]           Therapeutic Activities            [ ]    Patient and Family Training/Education  [ ]           Other (comment):     Frequency/Duration: Patient will be followed by physical therapy  twice daily to address goals. Discharge Recommendations: Home Health  Further Equipment Recommendations for Discharge: rolling walker       SUBJECTIVE:   Patient stated I am doing fine; please call me Rosa Elena Piña.       OBJECTIVE DATA SUMMARY:   HISTORY:    Past Medical History   Diagnosis Date    Arthritis         L knee OA both knees per pt    Hypercholesterolemia      Hypertension      Ill-defined condition         obese  BMI= 30.7 on 1/25/2017    Liver disease         H/O HEPATITIS A  contracted while working in [facility] Other ill-defined conditions([phone] [facility] AS [facility] 6CM TILT     Past Surgical History   Procedure Laterality Date    Hx appendectomy   1980s    Endoscopy, colon, diagnostic   1995 & 2005 2015    Hx heent           wisdom teeth extraction    Hx orthopaedic   1995       L BUNIONECTOMY    Hx orthopaedic   4/12       A/S Meniscal Repair L Knee    Hx orthopaedic   4/11       A/S Meniscal Repair R Knee     Prior Level of Function/Home Situation: using a cane for last few weeks due to pain.    Personal factors and/or comorbidities impacting plan of care:      Home Situation  Home Environment: Private residence  # Steps to Enter: 3  Rails to Enter: No  One/Two Story Residence: Two story  Living Alone: Yes  Support Systems: Friends \ neighbors, Family member(s)  Patient Expects to be Discharged to[de-identified] Private residence  Current DME Used/Available at Home: Clyde Garay, straight, Shelbi Severe, rollator, Raised toilet seat     EXAMINATION/PRESENTATION/DECISION MAKING:   Critical Behavior:  Neurologic State: Alert  Orientation Level: Oriented X4  Cognition: Appropriate for age attention/concentration  Safety/Judgement: Good awareness of safety precautions  Skin:  Not fully observed. Strength:    Strength: Within functional limits (LLE not tested)                    Tone & Sensation:                  Sensation: Intact               Range Of Motion:  AROM: Within functional limits (LLE decreased due to surgery)                             Functional Mobility:  Bed Mobility:     Supine to Sit: Additional time;Minimum assistance  Sit to Supine: Additional time;Minimum assistance     Transfers:  Sit to Stand: Assist x2;Minimum assistance  Stand to Sit: Assist x2;Minimum assistance                       Balance:   Sitting: Intact  Standing: Intact; With support  Ambulation/Gait Training:  Distance (ft): 6 Feet (ft)  Assistive Device: Gait belt;Walker, rolling  Ambulation - Level of Assistance: Assist x2;Minimal assistance        Gait Abnormalities: Step to gait     Left Side Weight Bearing: As tolerated                                                                      Therapeutic Exercises:    Ankle pumps, quad and gluteal sets, heel slides           Physical Therapy Evaluation Charge Determination   History Examination Presentation Decision-Making   LOW Complexity : Zero comorbidities / personal factors that will impact the outcome / POC LOW Complexity : 1-2 Standardized tests and measures addressing body structure, function, activity limitation and / or participation in recreation  LOW Complexity : Stable, uncomplicated  LOW Complexity : FOTO score of       Based on the above components, the patient evaluation is determined to be of the following complexity level: LOW      Pain:  Pain Scale 1: Numeric (0 - 10)  Pain Intensity 1: 2  Pain Location 1: Hip  Pain Orientation 1: Left  Activity Tolerance:   Good. Please refer to the flowsheet for vital signs taken during this treatment. After treatment:   [ ]         Patient left in no apparent distress sitting up in chair  [X]         Patient left in no apparent distress in bed  [X]         Call bell left within reach  [X]         Nursing notified  [ ]         Caregiver present  [X]         Bed alarm activated      COMMUNICATION/EDUCATION:   The patients plan of care was discussed with: Registered Nurse.  [X]         Fall prevention education was provided and the patient/caregiver indicated understanding. [ ]         Patient/family have participated as able in goal setting and plan of care. [X]         Patient/family agree to work toward stated goals and plan of care. [ ]         Patient understands intent and goals of therapy, but is neutral about his/her participation. [ ]         Patient is unable to participate in goal setting and plan of care.      Thank you for this referral.  Femi Galan, PT   Time Calculation: 25 mins

## 2017-02-06 NOTE — IP AVS SNAPSHOT
Current Discharge Medication List  
  
Take these medications at their scheduled times Dose & Instructions Dispensing Information Comments Morning Noon Evening Bedtime * aspirin delayed-release 81 mg tablet Your next dose is: Today, Tomorrow Other:  ____________ Dose:  81 mg  
take 81 mg by mouth daily. Refills:  0  
     
   
   
   
  
 * aspirin 325 mg tablet Commonly known as:  ASPIRIN Your next dose is: Today, Tomorrow Other:  ____________ Dose:  325 mg Take 1 Tab by mouth two (2) times a day. Quantity:  60 Tab Refills:  0  
     
   
   
   
  
 b complex vitamins tablet Your next dose is: Today, Tomorrow Other:  ____________ Dose:  1 Tab Take 1 Tab by mouth daily. Refills:  0  
     
   
   
   
  
 bisacodyl 10 mg suppository Commonly known as:  DULCOLAX (BISACODYL) Your next dose is: Today, Tomorrow Other:  ____________ Dose:  10 mg Insert 10 mg into rectum daily. Quantity:  2 Suppository Refills:  0  
     
   
   
   
  
 glucosam-chond-msm-boron-hyal 789-54.2-124-5 mg Tab Your next dose is: Today, Tomorrow Other:  ____________ Dose:  1 Tab Take 1 Tab by mouth daily. Refills:  0  
     
   
   
   
  
 hydroCHLOROthiazide 25 mg tablet Commonly known as:  HYDRODIURIL Your next dose is: Today, Tomorrow Other:  ____________ Dose:  25 mg Take 1 Tab by mouth daily for 30 days. Quantity:  30 Tab Refills:  5  
     
   
   
   
  
 melatonin Tab tablet Your next dose is: Today, Tomorrow Other:  ____________ Dose:  5 mg Take 5 mg by mouth nightly. Indications: 2 tabltes Refills:  0  
     
   
   
   
  
 multivitamin tablet Commonly known as:  ONE A DAY Your next dose is: Today, Tomorrow Other:  ____________ Dose:  1 Tab Take 1 Tab by mouth daily. Refills:  0  
     
   
   
   
  
 nicotinic acid 500 mg tablet Commonly known as:  NIACIN Your next dose is: Today, Tomorrow Other:  ____________ Dose:  500 mg Take 500 mg by mouth Daily (before breakfast). Refills:  0 OMEGA 3 PO Your next dose is: Today, Tomorrow Other:  ____________ Dose:  1 Cap Take 1 Cap by mouth daily. Refills:  0  
     
   
   
   
  
 simvastatin 40 mg tablet Commonly known as:  ZOCOR Your next dose is: Today, Tomorrow Other:  ____________ Dose:  40 mg Take 1 Tab by mouth nightly. Quantity:  30 Tab Refills:  5  
     
   
   
   
  
 * Notice: This list has 2 medication(s) that are the same as other medications prescribed for you. Read the directions carefully, and ask your doctor or other care provider to review them with you. Take these medications as needed Dose & Instructions Dispensing Information Comments Morning Noon Evening Bedtime  
 ibuprofen 800 mg tablet Commonly known as:  MOTRIN Your next dose is: Today, Tomorrow Other:  ____________ Dose:  800 mg Take 800 mg by mouth every six (6) hours as needed for Pain. Indications: OSTEOARTHRITIS Refills:  0  
     
   
   
   
  
 ondansetron 8 mg disintegrating tablet Commonly known as:  ZOFRAN ODT Your next dose is: Today, Tomorrow Other:  ____________ Dose:  4 mg Take 0.5 Tabs by mouth every eight (8) hours as needed for Nausea. Quantity:  30 Tab Refills:  0  
     
   
   
   
  
 oxyCODONE IR 5 mg immediate release tablet Commonly known as:  Corrinne Mitten Your next dose is: Today, Tomorrow Other:  ____________ Dose:  5 mg Take 1 Tab by mouth every eight (8) hours as needed for Pain. Max Daily Amount: 15 mg. Quantity:  60 Tab Refills:  0  
     
   
   
   
  
 oxyCODONE-acetaminophen 7.5-325 mg per tablet Commonly known as:  PERCOCET 7.5 Your next dose is: Today, Tomorrow Other:  ____________ Dose:  1-2 Tab Take 1-2 Tabs by mouth every four (4) hours as needed for Pain. Max Daily Amount: 12 Tabs. Quantity:  70 Tab Refills:  0  
     
   
   
   
  
 traMADol 50 mg tablet Commonly known as:  ULTRAM  
   
Your next dose is: Today, Tomorrow Other:  ____________ Dose:  50 mg Take 1 Tab by mouth every six (6) hours as needed for Pain. Max Daily Amount: 200 mg. Quantity:  60 Tab Refills:  0 Take these medications as directed Dose & Instructions Dispensing Information Comments Morning Noon Evening Bedtime OTHER Your next dose is: Today, Tomorrow Other:  ____________ Juice plus for vegetable and fruit supplementation Refills:  0 Where to Get Your Medications Information about where to get these medications is not yet available ! Ask your nurse or doctor about these medications  
  aspirin 325 mg tablet  
 bisacodyl 10 mg suppository  
 ondansetron 8 mg disintegrating tablet  
 oxyCODONE IR 5 mg immediate release tablet  
 oxyCODONE-acetaminophen 7.5-325 mg per tablet  
 traMADol 50 mg tablet

## 2017-02-07 LAB
ANION GAP BLD CALC-SCNC: 7 MMOL/L (ref 5–15)
BUN SERPL-MCNC: 7 MG/DL (ref 6–20)
BUN/CREAT SERPL: 11 (ref 12–20)
CALCIUM SERPL-MCNC: 8.9 MG/DL (ref 8.5–10.1)
CHLORIDE SERPL-SCNC: 102 MMOL/L (ref 97–108)
CO2 SERPL-SCNC: 28 MMOL/L (ref 21–32)
CREAT SERPL-MCNC: 0.64 MG/DL (ref 0.55–1.02)
GLUCOSE SERPL-MCNC: 108 MG/DL (ref 65–100)
HGB BLD-MCNC: 10.5 G/DL (ref 11.5–16)
POTASSIUM SERPL-SCNC: 3.4 MMOL/L (ref 3.5–5.1)
SODIUM SERPL-SCNC: 137 MMOL/L (ref 136–145)

## 2017-02-07 PROCEDURE — 85018 HEMOGLOBIN: CPT | Performed by: NURSE PRACTITIONER

## 2017-02-07 PROCEDURE — 97535 SELF CARE MNGMENT TRAINING: CPT

## 2017-02-07 PROCEDURE — 74011250637 HC RX REV CODE- 250/637: Performed by: NURSE PRACTITIONER

## 2017-02-07 PROCEDURE — 97530 THERAPEUTIC ACTIVITIES: CPT

## 2017-02-07 PROCEDURE — 80048 BASIC METABOLIC PNL TOTAL CA: CPT | Performed by: NURSE PRACTITIONER

## 2017-02-07 PROCEDURE — 74011250637 HC RX REV CODE- 250/637: Performed by: ANESTHESIOLOGY

## 2017-02-07 PROCEDURE — G8988 SELF CARE GOAL STATUS: HCPCS

## 2017-02-07 PROCEDURE — 36415 COLL VENOUS BLD VENIPUNCTURE: CPT | Performed by: NURSE PRACTITIONER

## 2017-02-07 PROCEDURE — 65270000029 HC RM PRIVATE

## 2017-02-07 PROCEDURE — G8987 SELF CARE CURRENT STATUS: HCPCS

## 2017-02-07 PROCEDURE — 74011250636 HC RX REV CODE- 250/636: Performed by: NURSE PRACTITIONER

## 2017-02-07 PROCEDURE — 97116 GAIT TRAINING THERAPY: CPT

## 2017-02-07 PROCEDURE — 97165 OT EVAL LOW COMPLEX 30 MIN: CPT

## 2017-02-07 RX ADMIN — MELATONIN TAB 3 MG 4.5 MG: 3 TAB at 22:19

## 2017-02-07 RX ADMIN — OXYCODONE HYDROCHLORIDE 5 MG: 5 TABLET ORAL at 09:20

## 2017-02-07 RX ADMIN — FAMOTIDINE 20 MG: 20 TABLET, FILM COATED ORAL at 17:03

## 2017-02-07 RX ADMIN — Medication 1 TABLET: at 17:03

## 2017-02-07 RX ADMIN — OXYCODONE HYDROCHLORIDE 10 MG: 5 TABLET ORAL at 13:00

## 2017-02-07 RX ADMIN — OXYCODONE HYDROCHLORIDE 10 MG: 10 TABLET, FILM COATED, EXTENDED RELEASE ORAL at 09:19

## 2017-02-07 RX ADMIN — OXYCODONE HYDROCHLORIDE 10 MG: 5 TABLET ORAL at 19:08

## 2017-02-07 RX ADMIN — SIMVASTATIN 40 MG: 20 TABLET, FILM COATED ORAL at 22:19

## 2017-02-07 RX ADMIN — OXYCODONE HYDROCHLORIDE 5 MG: 5 TABLET ORAL at 22:19

## 2017-02-07 RX ADMIN — CELECOXIB 200 MG: 100 CAPSULE ORAL at 19:08

## 2017-02-07 RX ADMIN — OXYCODONE HYDROCHLORIDE 10 MG: 10 TABLET, FILM COATED, EXTENDED RELEASE ORAL at 22:19

## 2017-02-07 RX ADMIN — ASPIRIN 325 MG: 325 TABLET, DELAYED RELEASE ORAL at 09:19

## 2017-02-07 RX ADMIN — OXYCODONE HYDROCHLORIDE 5 MG: 5 TABLET ORAL at 06:02

## 2017-02-07 RX ADMIN — CELECOXIB 200 MG: 100 CAPSULE ORAL at 05:59

## 2017-02-07 RX ADMIN — CEFAZOLIN 2 G: 1 INJECTION, POWDER, FOR SOLUTION INTRAMUSCULAR; INTRAVENOUS; PARENTERAL at 05:59

## 2017-02-07 RX ADMIN — OXYCODONE HYDROCHLORIDE 10 MG: 5 TABLET ORAL at 02:13

## 2017-02-07 RX ADMIN — ACETAMINOPHEN 650 MG: 325 TABLET ORAL at 17:03

## 2017-02-07 RX ADMIN — FAMOTIDINE 20 MG: 20 TABLET, FILM COATED ORAL at 09:20

## 2017-02-07 RX ADMIN — Medication 10 ML: at 22:19

## 2017-02-07 RX ADMIN — POLYETHYLENE GLYCOL 3350 17 G: 17 POWDER, FOR SOLUTION ORAL at 09:19

## 2017-02-07 RX ADMIN — ASPIRIN 325 MG: 325 TABLET, DELAYED RELEASE ORAL at 17:03

## 2017-02-07 RX ADMIN — ACETAMINOPHEN 650 MG: 325 TABLET ORAL at 05:59

## 2017-02-07 RX ADMIN — ACETAMINOPHEN 650 MG: 325 TABLET ORAL at 12:16

## 2017-02-07 RX ADMIN — OXYCODONE HYDROCHLORIDE 5 MG: 5 TABLET ORAL at 16:04

## 2017-02-07 RX ADMIN — Medication 10 ML: at 06:00

## 2017-02-07 RX ADMIN — Medication 1 TABLET: at 09:20

## 2017-02-07 NOTE — CDMP QUERY
Please clarify if this patient is being treated/managed for:    =>Hypokalemia (K 3.4) in the setting of HCTZ, no treatment yet  =>Other Explanation of clinical findings  =>Unable to Determine (no explanation of clinical findings)    Please clarify and document your clinical opinion in the progress notes and discharge summary including the definitive and/or presumptive diagnosis, (suspected or probable), related to the above clinical findings. Please include clinical findings supporting your diagnosis.     Stanton Kahn [facility], [address] [place] [address]  [email or web address]

## 2017-02-07 NOTE — PROGRESS NOTES
Problem: Mobility Impaired (Adult and Pediatric)  Goal: *Acute Goals and Plan of Care (Insert Text)  Physical Therapy Goals  Initiated 2/6/2017    1. Patient will move from supine to sit and sit to supine in bed with independence within 4 days. 2. Patient will perform sit to stand with modified independence within 4 days. 3. Patient will ambulate with modified independence for 100 feet with the least restrictive device within 4 days. 4. Patient will ascend/descend 4 stairs with 1 handrail(s) with minimal assistance/contact guard assist within 4 days. 5. Patient will verbalize and demonstrate understanding of anterior hip precautions per protocol within 4 days. 6. Patient will perform total hip home exercise program per protocol with independence within 4 days. PHYSICAL THERAPY TREATMENT  Patient: Shila Mitchell (4 y.o. female)  Date: 2/7/2017  Diagnosis: RIGHT HIP OA  Primary localized osteoarthritis of left hip <principal problem not specified>  Procedure(s) (LRB):  LEFT TOTAL HIP ARTHROPLASTY DIRECT ANTERIOR APPROACH  (Left) 1 Day Post-Op  Precautions: WBAT, Fall, Total hip (anterior approach)      ASSESSMENT:  Patient received up in chair willing to work with PT. She ambulated 60 feet with rolling walker in hallway and to restroom,  with CGA. Needs occasional cues for sequence. Will likely  be able to attempt steps in PM.   Progression toward goals:  [X]      Improving appropriately and progressing toward goals  [ ]      Improving slowly and progressing toward goals  [ ]      Not making progress toward goals and plan of care will be adjusted       PLAN:  Patient continues to benefit from skilled intervention to address the above impairments. Continue treatment per established plan of care. Discharge Recommendations:  Home Health  Further Equipment Recommendations for Discharge:  rolling walker       SUBJECTIVE:   Patient stated I am doing pretty good I think.       OBJECTIVE DATA SUMMARY:   Critical Behavior:  Neurologic State: Appropriate for age  Orientation Level: Oriented X4  Cognition: Appropriate for age attention/concentration  Safety/Judgement: Good awareness of safety precautions  Functional Mobility Training:  Bed Mobility:      Received up in chair. Transfers:  Sit to Stand: Additional time;Contact guard assistance  Stand to Sit: Additional time;Contact guard assistance                             Balance:  Sitting: Intact  Standing: Intact; With support  Ambulation/Gait Training:  Distance (ft): 60 Feet (ft)  Assistive Device: Gait belt;Walker, rolling  Ambulation - Level of Assistance: Contact guard assistance        Gait Abnormalities: Step to gait     Left Side Weight Bearing: As tolerated                                                       Therapeutic Exercises:   SUPINE  EXERCISES   Sets   Reps   Active Active Assist   Passive Self ROM   Comments   Ankle Pumps     [X]                                        [ ]                                        [ ]                                        [ ]                                            Quad Sets     [ ]                                        [ ]                                        [ ]                                        [ ]                                            [address]     [ ]                                        [ ]                                        [ ]                                        [ ]                                            Hip Abduction     [ ]                                        [ ]                                        [ ]                                        [ ]                                            Glut Sets     [X]                                        [ ]                                        [ ]                                        [ ]                                                  [ ]                                        [ ]                                        [ ]                                        [ ]                                                  [ ]                                        [ ]                                        [ ]                                        [ ]                                                Soledad Medley     [ ]                                        [ ]                                        [ ]                                        [ ]                                            Hip Abduction     [ ]                                        [ ]                                        [ ]                                        [ ]                                                  [ ]                                        [ ]                                        [ ]                                        [ ]                                                  [ ]                                        [ ]                                        [ ]                                        [ ]                                               Pain:  Pain Scale 1: Numeric (0 - 10)  Pain Intensity 1: 2           Activity Tolerance:   good  Please refer to the flowsheet for vital signs taken during this treatment.   After treatment:   [X] Patient left in no apparent distress sitting up in chair  [ ] Patient left in no apparent distress in bed  [X] Call bell left within reach  [X] Nursing notified  [ ] Caregiver present  [ ] Bed alarm activated      COMMUNICATION/COLLABORATION:   The patients plan of care was discussed with: Registered Nurse     Elida Chaney, PT   Time Calculation: 32 mins

## 2017-02-07 NOTE — ADT AUTH CERT NOTES
Notification of inpatient admittance following an elective surgical procedure    Diagnosis code : M16.12    `   Allergies (verified on: 02/06/17)    `     (No Known Allergies)   `          `   Diagnosis    `     None   `          RICK ACEVES ([id number])  Patient Demographics         Name:  Estrella Mcneil  MRN:  [id number]       Address:  [address] [place] [address]  SSN:             Sex:  Female       Home Phone:  [phone]  YOB: 1950 (69 yrs)       Work Phone:  [phone]  E-Mail Address:  [email or web address]       Registration Status:  Verified  PCP:  Julio Corbin       Date Last Verified:  10/21/2008  Employer:  [employer]       Next Review Date:  3/5/2017                            Hospital Account       Name:  [facility] Account ID:  [de-identified]     Class:  INPATIENT  Status:  Open     Guarantor:  Charlie Molina [id number]  Primary Coverage:  [facility] EMPLOYEE PLAN     External Hosp Acct ID:  [id number]           Guarantor Account Information                 Account ID - Guarantor  Service Area  Active?   Guarantor Account Type  Guarantor Account Status  Joanna Status            [id number] - Antonio Johansen  Yes  P/F    Verified           Coverages:    [facility] MEDICARE PART A       AETNA/BSHSI [facility]/BSHSI [facility] EMPLOYEE PLAN                   Coverage Information         F/O  Payor/Plan  Subscriber Name  Eff From  Eff To  Joanna Status     30  AETNA/BSHSI AET*  RICK ACEVES  09/01/2013         [facility]*/BSHSI UNI*  RICK ACEVES  01/01/2011 08/01/2013       32  VA MEDICA*/[place] MEDICA*  RICK ACEVES  06/01/2013              Admission Information       Attending Provider:  Porsche Muller MD  Auth/Cert Status:  Incomplete     Admitting Provider:  Porsche Muller MD  Service Area:  [address] [place] [address]     Admission Type:  ELECTIVE  Unit:  [facility] 4M POST SURG ORT 1     Hospital Service:  SURGERY  Room:  409     Admission Date:  2/6/2017  Bed:  01     Discharge Date:

## 2017-02-07 NOTE — PROGRESS NOTES
Provided pastoral care visit to [facility] 5 patient. Did not include sacramental care.     [facility]

## 2017-02-07 NOTE — INTERDISCIPLINARY ROUNDS
[address]    Patient Information    Name: Antony Marie  Age: 69 y.o. Admission Date: 2/6/2017  Surgery/Procedure: Procedure(s):  LEFT TOTAL HIP ARTHROPLASTY DIRECT ANTERIOR APPROACH   Attending Provider: Marsha Rutherford MD  Surgeon: Brittany Officer   Problem List: Active Problems:    Primary localized osteoarthritis of left hip (2/6/2017)      During rounds the following quality care indicators and evidence based practices were addressed :       PT/OT: Patient mobility - Walked 61' with CGA. Will trial stairs this afternoon. Bed Mobility Training  Supine to Sit: Additional time, Minimum assistance  Sit to Supine: Additional time, Minimum assistance  Transfer Training  Sit to Stand: Additional time, Contact guard assistance  Stand to Sit: Additional time, Contact guard assistance      Gait Training  Assistive Device: Gait belt, Walker, rolling  Ambulation - Level of Assistance: Contact guard assistance  Distance (ft): 60 Feet (ft)   Weight Bearing Status  Left Side Weight Bearing: As tolerated      Pain Assessment  Pain Intensity 1: 2 (02/07/17 1032)  Pain Location 1: Hip  Pain Intervention(s) 1: Medication (see MAR)  Patient Stated Pain Goal: [place]: TBD  Rehab/SNF Facility:     Pain meds: Oxycodone and OxyContin  Antibiotics completed:  Yes  Anticoagulation:  ASA bid    SCDs: in place  Sanchez: N   Bowels:     Goals For Today: Progress with PT, pain control    RRAT Score:      Readmit Risk Tool  Support Systems: Family member(s)  Relationship with Primary Physician Group: Seen at least one time within the past 6 months    Discharge Management/Planning:    Readmit Risk Assessment Information:   Medium Risk            15       Total Score        3 Relationship with PCP    4 More than 1 Admission in calendar year    5 Patient Insurance is Medicare, Medicaid or Self Pay    3 Charlson Comorbidity Score        Criteria that do not apply:    Patient Living Status    Patient Length of Stay > 5         Readmit Risk Tool  Support Systems: Family member(s)  Relationship with Primary Physician Group: Seen at least one time within the past 6 months    Anticipated Date of Discharge: today/tomorrow    Interdisciplinary team rounds were held with the following team members: Nurse Practitioner, Case Management, Nursing, Pharmacy, and Rehab. See clinical pathway and/or care plan for interventions and desired outcomes.

## 2017-02-07 NOTE — PROGRESS NOTES
NUTRITION   RD Screen      RD to add Prosource Powder Q meal  And Am snack: Peanut butter and crackers and cheese stick    Diet: regular  Body mass index is 32.44 kg/(m^2). Skin:L hip incision  PO Intake:   No data found. Estimated Daily Nutrition Requirements:  Kcals:     2010 ( msje x 1.3)             Protein:                85 gms ( 1 gm/kg)  Fluid:   2540 ml    Consult received for general nutrition and supplements. Pt s/p  Left total hip arthroplasty 2/6/17. Pt reports good appetite, eating well at meals. States she does not eat meat. We discussed protein sources, she agreed to a snack and Prosource powder at meals to increase protein for healing. She denies any weight changes. Sister in room, we discussed meals at home and ways to eat healthy. Sister has meals set up from friends to help pt when she returns home. Pt is tolerating po diet very well w/ adequate po intake and meeting estimated nutrition needs. Nutrition Diagnosis: n/a  Nutrition Intervention: n/a  Goal: n/a  Monitoring and Evaluation: n/a    No nutrition risk identified at this time.    RD will continue to monitor and will f/u for further nutrition assessment and intervention as appropriate    Education & Discharge Needs:   [] Nutrition related discharge needs addressed:     [] Supplements (on d/c instruction &/or coupons provided)  [x] Education discussed healthy eating   []No nutrition related discharge needs at this time     Cultural, Buddhist and ethnic food preferences identified    [x]None   [] Yes     Juhi Marquez RD

## 2017-02-07 NOTE — PROGRESS NOTES
Problem: Mobility Impaired (Adult and Pediatric)  Goal: *Acute Goals and Plan of Care (Insert Text)  Physical Therapy Goals  Initiated 2/6/2017    1. Patient will move from supine to sit and sit to supine in bed with independence within 4 days. 2. Patient will perform sit to stand with modified independence within 4 days. 3. Patient will ambulate with modified independence for 100 feet with the least restrictive device within 4 days. 4. Patient will ascend/descend 4 stairs with 1 handrail(s) with minimal assistance/contact guard assist within 4 days. 5. Patient will verbalize and demonstrate understanding of anterior hip precautions per protocol within 4 days. 6. Patient will perform total hip home exercise program per protocol with independence within 4 days. PHYSICAL THERAPY TREATMENT  Patient: Kari Lu (62 y.o. female)  Date: 2/7/2017  Diagnosis: RIGHT HIP OA  Primary localized osteoarthritis of left hip <principal problem not specified>  Procedure(s) (LRB):  LEFT TOTAL HIP ARTHROPLASTY DIRECT ANTERIOR APPROACH  (Left) 1 Day Post-Op  Precautions: WBAT, Fall, Total hip (anterior approach)  ASSESSMENT:  Patient received in bed ready for PT but reporting 5/10 pain. Patient with good recall of anterior hip precautions but needing cues for sequence and safety practices during ambulation. Patient ambulated about 75 feet total, and went up and down 4 steps with min/CGA. Sister present and able to assist patient upon discharge. Sister is a PT and requested a gait belt. PT provided her with one for home use. Patient with a bit slower processing and increased pain this PM. Recommend one more session in AM to practice steps again.   Progression toward goals:  [X]      Improving appropriately and progressing toward goals  [ ]      Improving slowly and progressing toward goals  [ ]      Not making progress toward goals and plan of care will be adjusted       PLAN:  Patient continues to benefit from skilled intervention to address the above impairments. Continue treatment per established plan of care. Discharge Recommendations:  Home Health  Further Equipment Recommendations for Discharge:  None new. SUBJECTIVE:   \" I am ready for you and my sister is here to watch. \"      OBJECTIVE DATA SUMMARY:   Functional Mobility Training:  Bed Mobility:     Supine to Sit: Additional time;Contact guard assistance              Transfers:  Sit to Stand: Additional time;Minimum assistance  Stand to Sit: Additional time;Contact guard assistance                             Ambulation/Gait Training:  Distance (ft): 75 Feet (ft)  Assistive Device: Gait belt;Walker, rolling  Ambulation - Level of Assistance: Contact guard assistance        Gait Abnormalities: Antalgic; Step to gait; Decreased step clearance     Left Side Weight Bearing: As tolerated        Speed/Luci: Pace decreased (<100 feet/min)  Step Length: Left shortened;Right shortened                               Stairs:  Number of Stairs Trained: 4  Stairs - Level of Assistance: Minimum assistance; Additional time  Rail Use: Left  (single point cane on left)     Therapeutic Exercises:   Exercises performed per protocol. See morning treatment note for description. Pain:  Pain Scale 1: Numeric (0 - 10)  Pain Intensity 1: 5  Pain Location 1: Hip  Pain Orientation 1: Left  Activity Tolerance:   Increased pain this session. Please refer to the flowsheet for vital signs taken during this treatment.   After treatment:   [X] Patient left in no apparent distress sitting up in chair  [ ] Patient left in no apparent distress in bed  [X] Call bell left within reach  [X] Nursing notified  [ ] Caregiver present  [ ] Bed alarm activated      COMMUNICATION/COLLABORATION:   The patients plan of care was discussed with: Registered Nurse     Gifty Aguirre PT   Time Calculation: 35 mins

## 2017-02-07 NOTE — PROGRESS NOTES
Bedside and Verbal shift change report given to Rashaun Calzada (oncoming nurse) by Asha Nascimento RN (offgoing nurse). Report included the following information SBAR, Kardex, Intake/Output and MAR.

## 2017-02-07 NOTE — PROGRESS NOTES
2/7/2017 11:52 AM  OT order received. [id number] Muller was notified of OT order via [facility].  JAMARCUS Wyatt

## 2017-02-07 NOTE — PROGRESS NOTES
Problem: Self Care Deficits Care Plan (Adult)  Goal: *Acute Goals and Plan of Care (Insert Text)  Occupational Therapy Goals  Initiated 2/7/2017     1. Patient will perform lower body dressing using AE at modified independence level within 7 days. 2. Patient will perform toilet transfers at modified independence level using Walkers, Type: Rolling Walker within 7 days. 3. Patient will tolerate grooming standing at the sink with modified independence within 7 days. 4. Patient will demonstrate 3/3 hip precautions without cues within 7 days. OCCUPATIONAL THERAPY EVALUATION  Patient: Aminah Peng (89 y.o. female)  Date: 2/7/2017  Primary Diagnosis: RIGHT HIP OA  Primary localized osteoarthritis of left hip  Procedure(s) (LRB):  LEFT TOTAL HIP ARTHROPLASTY DIRECT ANTERIOR APPROACH  (Left) 1 Day Post-Op   Precautions: \  WBAT, Fall, Total hip (anterior approach)      ASSESSMENT :  Based on the objective data described below, the patient presents with generalized weakness, decreased standing tolerance, decreased functional reach to feet and increased pain with activity. Pt currently able to complete basic ADLs with a minimal amount of assistance and CGA for mobility. 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 Pt provided education on AE, tub transfers and compensatory techniques with good return demonstration. Feel pt would be safe to discharge home today IF CLEARED BY PT and [facility] (specificially to address tub transfers). (however pt will have assist from her sister). Suggest follow up next session with education on tub transfers, review of AE and questions/concerns.        Recommend with nursing patient to complete as able in order to maintain strength, endurance and independence: OOB to chair 3x/day with assist x 1/RW, ADLs with LB assist and mobilizing to the bathroom for toileting with assist X 1/RW. Thank you for your assistance. Patient will benefit from skilled intervention to address the above impairments. Patients rehabilitation potential is considered to be Excellent  Factors which may influence rehabilitation potential include:   [X]             None noted  [ ]             Mental ability/status  [ ]             Medical condition  [ ]             Home/family situation and support systems  [ ]             Safety awareness  [ ]             Pain tolerance/management  [ ]             Other:        PLAN :  Recommendations and Planned Interventions:  [X]               Self Care Training                  [X]        Therapeutic Activities  [X]               Functional Mobility Training    [ ]        Cognitive Retraining  [X]               Therapeutic Exercises           [X]        Endurance Activities  [X]               Balance Training                   [ ]        Neuromuscular Re-Education  [ ]               Visual/Perceptual Training     [X]   Home Safety Training  [X]               Patient Education                 [X]        Family Training/Education  [ ]               Other (comment):     Frequency/Duration: Patient will be followed by occupational therapy 5 times a week to address goals. Discharge Recommendations: Home Health OT vs None  Further Equipment Recommendations for Discharge: RW delivered to hospital room during session; has a hip kit and raised toilet seat already. Planning to get a shower stool        SUBJECTIVE:   Patient stated Nakia Davis do you think: Should I stay another night? Beth Mathew      OBJECTIVE DATA SUMMARY:   HISTORY:   Past Medical History   Diagnosis Date    Arthritis         L knee OA both knees per pt    Hypercholesterolemia      Hypertension      Ill-defined condition obese  BMI= 30.7 on 1/25/2017    Liver disease         H/O HEPATITIS A  contracted while working in [facility] [employer] Other ill-defined conditions(279.89) [address] AS [facility] 6CM TILT     Past Surgical History   Procedure Laterality Date    Hx appendectomy   1980s    Endoscopy, colon, diagnostic   1995 & 2005 2015    Hx heent           wisdom teeth extraction    Hx orthopaedic   1995       L BUNIONECTOMY    Hx orthopaedic   4/12       A/S Meniscal [employer]air L Knee    Hx orthopaedic   4/11       A/S Meniscal Repair R Knee        Prior Level of Function/Home Situation: IND with ADLs and ambulation; 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 [employer] but used to work at [employer]  Expanded or extensive additional review of patient history: breif     Home Situation  Home Environment: Private residence  # Steps to Enter: 3  Rails to Enter: No  One/Two Story Residence: Two story  # of Interior Steps: 13  Height of Each Step (in): 0 inches  Interior Rails: Left  Lift Chair Available: No  Living Alone: Yes  Support Systems: Family member(s)  Patient Expects to be Discharged to[de-identified] Private residence  Current DME Used/Available at Home: Cane, straight, [employer], rollator, Raised toilet seat (Has AE; uses the [facility], [facility]; raised toilet seat)  Tub or Shower Type: Tub/Shower combination (planning to get shower stool)  [X]  Right hand dominant             [ ]  Left hand dominant     EXAMINATION OF PERFORMANCE DEFICITS:  Cognitive/Behavioral Status:  Neurologic State: Alert; Appropriate for age  Orientation Level: Appropriate for age  Cognition: Appropriate decision making; Appropriate for age attention/concentration; Appropriate safety awareness; Follows commands  Perception: Appears intact  Perseveration: No perseveration noted  Safety/Judgement: Fall prevention  Skin: intact  Edema: None noted  Vision/Perceptual:            Corrective Lenses: Glasses  Range of Motion:  AROM: Within functional limits (UEs) Strength:  Strength: Within functional limits     Coordination:     Fine Motor Skills-Upper: Left Intact; Right Intact    Gross Motor Skills-Upper: Left Intact; Right Intact  Tone & Sensation:     Sensation: Intact     Balance:  Sitting: Intact  Standing: Intact; With support     Functional Mobility and Transfers for ADLs:  Bed Mobility:        Transfers:  Sit to Stand: Contact guard assistance  Stand to Sit: Contact guard assistance  Toilet Transfer : Contact guard assistance     ADL Assessment:  Feeding: Independent     Oral Facial Hygiene/Grooming: Independent     Bathing: Minimum assistance (recommend LHS for bathing distal LEs)     Upper Body Dressing: Supervision     Lower Body Dressing: Minimum assistance (initial training in AE)     Toileting: Modified independent     ADL Intervention and task modifications:     Lower Body Dressing Assistance  Socks: Minimum assistance (initial instruction in AE)  Position Performed: Seated in chair  Adaptive Equipment Used: Dressing stick; Long handled shoe horn;Reacher;Sock aid; Walker     Toileting  Toileting Assistance: Modified independent  Bladder Hygiene: Modified independent  Clothing Management: Modified independent     Cognitive Retraining  Safety/Judgement: Fall prevention     Functional Measure:  Barthel Index:      Bathin  Bladder: 10  Bowels: 10  Groomin  Dressin  Feeding: 10  Mobility: 0  Stairs: 0  Toilet Use: 5  Transfer (Bed to Chair and Back): 10  Total: 55         Barthel and G-code impairment scale:  Percentage of impairment CH  0% CI  1-19% CJ  20-39% CK  40-59% CL  60-79% CM  80-99% CN  100%   Barthel Score 0-100 100 99-80 79-60 59-40 20-39 1-19    0   Barthel Score 0-20 20 17-19 13-16 9-12 5-8 1-4 0      The Barthel ADL Index: Guidelines  1. The index should be used as a record of what a patient does, not as a record of what a patient could do.   2. The main aim is to establish degree of independence from any help, physical or verbal, however minor and for whatever reason. 3. The need for supervision renders the patient not independent. 4. A patient's performance should be established using the best available evidence. Asking the patient, friends/relatives and nurses are the usual sources, but direct observation and common sense are also important. However direct testing is not needed. 5. Usually the patient's performance over the preceding 24-48 hours is important, but occasionally longer periods will be relevant. 6. Middle categories imply that the patient supplies over 50 per cent of the effort. 7. Use of aids to be independent is allowed. Tammy Peñaloza., Barthel, DJoanW. (1021). Functional evaluation: the Barthel Index. [address] [facility] (14)2. Lien Christianson ace GORGE Chavarria, Mayra Martinez., Pam Cota, [place], [address] (1999). Measuring the change indisability after inpatient rehabilitation; comparison of the responsiveness of the Barthel Index and Functional Payette Measure. Journal of Neurology, Neurosurgery, and Psychiatry, 66(4), 833-483. Elsie Cuevas, [place].JAYLENE, KENNY Mosqueda, & Alexis Miner, M.A. (2004.) Assessment of post-stroke quality of life in cost-effectiveness studies: The usefulness of the Barthel Index and the EuroQoL-5D. Quality of Life Research, 13, 250-50         G codes: In compliance with CMSs Claims Based Outcome Reporting, the following G-code set was chosen for this patient based on their primary functional limitation being treated: The outcome measure chosen to determine the severity of the functional limitation was the Barthel Index with a score of 55/100 which was correlated with the impairment scale.       · Self Care:               - CURRENT STATUS:    CK - 40%-59% impaired, limited or restricted               - GOAL STATUS:           CI - 1%-19% impaired, limited or restricted               - D/C STATUS:                       ---------------To be determined---------------      Occupational Therapy Evaluation Charge Determination   History Examination Decision-Making   LOW Complexity : Brief history review  LOW Complexity : 1-3 performance deficits relating to physical, cognitive , or psychosocial skils that result in activity limitations and / or participation restrictions  LOW Complexity : No comorbidities that affect functional and no verbal or physical assistance needed to complete eval tasks       Based on the above components, the patient evaluation is determined to be of the following complexity level: LOW   Pain:  Pain Scale 1: Numeric (0 - 10)  Pain Intensity 1: 7 (with activity )  Pain Location 1: Hip  Pain Orientation 1: Left  Pain Description 1: Sore  Pain Intervention(s) 1: Medication (see MAR)  Activity Tolerance:   No s/s of distress; Continue with low BP but no c/o dizziness     Please refer to the flowsheet for vital signs taken during this treatment. After treatment:   [X] Patient left in no apparent distress sitting up in chair  [ ] Patient left in no apparent distress in bed  [X] Call bell left within reach  [X] Nursing notified  [ ] Caregiver present  [ ] Bed alarm activated      COMMUNICATION/EDUCATION:   The patients plan of care was discussed with: Physical Therapist and Registered Nurse.  [X] Home safety education was provided and the patient/caregiver indicated understanding. [X] Patient/family have participated as able in goal setting and plan of care. [X] Patient/family agree to work toward stated goals and plan of care. [ ] Patient understands intent and goals of therapy, but is neutral about his/her participation. [ ] Patient is unable to participate in goal setting and plan of care. This patients plan of care is appropriate for delegation to [facility].      Thank you for this referral.  Prema Hanson OT  Time Calculation: 80 mins

## 2017-02-07 NOTE — PROGRESS NOTES
Bedside and Verbal shift change report given to Zen Hoffmann (oncoming nurse) by Keysha Ordonez (offgoing nurse). Report included the following information SBAR, Kardex and Recent Results.

## 2017-02-07 NOTE — PROGRESS NOTES
TOTAL HIP ARTHROPLASTY DAILY NOTE     ASSESSMENT / PLAN :   1. Pain Control : Stable  2. Overnight Issues : none  3. Wound or incisional issue : no drainage  4. Therapy / Weight Bearing Recommendations : WBAT w/ walker  5. DVT Prophylaxis : Mechanical and ASA  6. Disposition : Home  7. Medical Concerns : Stable  8. Comments : Mobilize w/ therap       POD  1 Day Post-Op s/p Procedure(s):  LEFT TOTAL HIP ARTHROPLASTY DIRECT ANTERIOR APPROACH      SUBJECTIVE :     Concerns : None. OBJECTIVE :     Patient Vitals for the past 12 hrs:   BP Temp Pulse Resp SpO2 Weight   02/07/17 0707 - - - - - 91.2 kg (201 lb)   02/07/17 0213 139/67 97.7 °F (36.5 °C) 73 16 97 % -   02/06/17 2021 129/64 97.9 °F (36.6 °C) 75 16 96 % -       Alert and oriented x3. left exam of the hip reveals that the dressing is clean, dry and intact. The patient is able to fire the quadriceps / flex at the hip  Sensation is intact to light touch. No calf pain.      Labs:  Recent Labs      02/07/17   0222   HGB  10.5*   NA  137   K  3.4*   CL  102   CO2  28   BUN  7   CREA  0.64   GLU  108*        Patient mobility  Gait  Gait Abnormalities: Step to gait  Ambulation - Level of Assistance: Assist x2, Minimal assistance  Distance (ft): 6 Feet (ft)  Assistive Device: Gait belt, WalkerToñito MD  Cell [phone]  Nurse [address] [place] [address] [phone]  Medical Staff : Zelda Grossman / Vinod Harada  Office : [phone] ext  [phone]

## 2017-02-08 VITALS
WEIGHT: 201 LBS | RESPIRATION RATE: 19 BRPM | BODY MASS INDEX: 32.3 KG/M2 | TEMPERATURE: 98.6 F | SYSTOLIC BLOOD PRESSURE: 123 MMHG | HEIGHT: 66 IN | DIASTOLIC BLOOD PRESSURE: 56 MMHG | HEART RATE: 78 BPM | OXYGEN SATURATION: 98 %

## 2017-02-08 LAB
ANION GAP BLD CALC-SCNC: 5 MMOL/L (ref 5–15)
BUN SERPL-MCNC: 8 MG/DL (ref 6–20)
BUN/CREAT SERPL: 12 (ref 12–20)
CALCIUM SERPL-MCNC: 8.9 MG/DL (ref 8.5–10.1)
CHLORIDE SERPL-SCNC: 103 MMOL/L (ref 97–108)
CO2 SERPL-SCNC: 30 MMOL/L (ref 21–32)
CREAT SERPL-MCNC: 0.66 MG/DL (ref 0.55–1.02)
GLUCOSE SERPL-MCNC: 87 MG/DL (ref 65–100)
HGB BLD-MCNC: 10.8 G/DL (ref 11.5–16)
POTASSIUM SERPL-SCNC: 4.1 MMOL/L (ref 3.5–5.1)
SODIUM SERPL-SCNC: 138 MMOL/L (ref 136–145)

## 2017-02-08 PROCEDURE — 36415 COLL VENOUS BLD VENIPUNCTURE: CPT | Performed by: NURSE PRACTITIONER

## 2017-02-08 PROCEDURE — 74011250637 HC RX REV CODE- 250/637: Performed by: ANESTHESIOLOGY

## 2017-02-08 PROCEDURE — 80048 BASIC METABOLIC PNL TOTAL CA: CPT | Performed by: NURSE PRACTITIONER

## 2017-02-08 PROCEDURE — 97535 SELF CARE MNGMENT TRAINING: CPT

## 2017-02-08 PROCEDURE — 85018 HEMOGLOBIN: CPT | Performed by: NURSE PRACTITIONER

## 2017-02-08 PROCEDURE — 97116 GAIT TRAINING THERAPY: CPT

## 2017-02-08 PROCEDURE — 74011250637 HC RX REV CODE- 250/637: Performed by: NURSE PRACTITIONER

## 2017-02-08 PROCEDURE — 97530 THERAPEUTIC ACTIVITIES: CPT

## 2017-02-08 RX ADMIN — POLYETHYLENE GLYCOL 3350 17 G: 17 POWDER, FOR SOLUTION ORAL at 08:39

## 2017-02-08 RX ADMIN — OXYCODONE HYDROCHLORIDE 10 MG: 5 TABLET ORAL at 08:45

## 2017-02-08 RX ADMIN — ASPIRIN 325 MG: 325 TABLET, DELAYED RELEASE ORAL at 08:39

## 2017-02-08 RX ADMIN — ACETAMINOPHEN 650 MG: 325 TABLET ORAL at 05:44

## 2017-02-08 RX ADMIN — ACETAMINOPHEN 650 MG: 325 TABLET ORAL at 11:58

## 2017-02-08 RX ADMIN — HYDROCHLOROTHIAZIDE 25 MG: 25 TABLET ORAL at 08:39

## 2017-02-08 RX ADMIN — OXYCODONE HYDROCHLORIDE 10 MG: 5 TABLET ORAL at 05:45

## 2017-02-08 RX ADMIN — CELECOXIB 200 MG: 100 CAPSULE ORAL at 05:45

## 2017-02-08 RX ADMIN — FAMOTIDINE 20 MG: 20 TABLET, FILM COATED ORAL at 08:39

## 2017-02-08 RX ADMIN — Medication 10 ML: at 05:46

## 2017-02-08 RX ADMIN — Medication 1 TABLET: at 08:39

## 2017-02-08 RX ADMIN — OXYCODONE HYDROCHLORIDE 10 MG: 5 TABLET ORAL at 11:58

## 2017-02-08 NOTE — DISCHARGE INSTRUCTIONS
TOTAL HIP DISCHARGE INSTRUCTIONS    Patient: Judeth Cowden MRN: [id number] SSN: [id number]             Please take the time to review the following instructions before you leave the hospital and use them as guidelines during your recovery from surgery. If you have any questions you may contact my office at [phone]. After hours or during the weekend you may reach me personally at [phone] if there is an emergency. SPECIAL INSTRUCTIONS :   1. Do not bend greater than 90 degrees at the hip for 4 weeks following your discharge  2. Avoid exercises or activities which bring the leg out or away from the mid-line of the body. The surgical repair involves this muscle and it will require 4 weeks to heal. You may disregard these instructions for a direct anterior approach. 3. You may walk as tolerated and are encouraged to work daily on progressing your activities with a walker initially. 4. You may transition to a cane for walking 5-7 days from surgery once you feel safe. You may use a walker for longer periods if you feel unstable. Wound Care/ Dressing Changes:      DRESSING :         Honey Comb Dressing : Please leave this dressing in place until your follow-up visit with Dr. Bren Morales. If your dressing becomes saturated or begins to peel off it can be removed and a clean dressing applied. Replacement dressing options can be purchased over the counter at a local pharmacy or medical supply vendor if needed. A porous adhesive dressing such as above can be purchased at a local [facility] or [place]. Showering/ Bathing: If your incision is dry without drainage you may shower following your discharge home. Please change your dressing if it becomes saturated after showering or begins to peel off. It is fine to have water run over the incision. Do not vigorously scrub your incision.   Apply a clean, dry dressing after you have dried your incision, if your dressing peels off. Do not take a bath or get into a swimming pool / [facility] until you follow up with Dr. Perfecto Schaffer. Do not soak your incision under water. If there is continued drainage or you are concerned contact Dr Karina Avilez office prior to showering [phone] ext [phone]. Diet:  You may advance to your regular diet as tolerated. Increase your clear liquid intake for the next 2-3 days. Nutrition Recommendations for Discharge:    Continue Oral Nutrition Supplements at discharge:   Ensure Enlive or Ensure Plus 1-2 bottles/day for 30 days unless otherwise directed by your Primary Care Physician. This product can be purchased at your local grocery store or drug store and online. Jessie Verdin RD   _            Medication:      1. You will be given prescriptions for pain medication when you are discharged from the hospital. The side effects of these medications can be substantial and the narcotic medications are not mandatory. You may substitute these medications with Tylenol or Alleve / Motrin. 2.  Please use the medications as prescribed. Pain medications may cause constipation- Colace twice daily and Miralax two scoops 2-3 times a day while taking the narcotic medication should help prevent constipation. Other possible side effects of pain medication are dizziness, headache, nausea, vomiting, and urinary retention. Discontinue the pain medication if you develop itching, rash, shortness of breath, or difficulties swallowing. If these symptoms become severe or are not relieved by discontinuing the medication, you should seek immediate medical attention. 3. Refills of pain medication are authorized during office hours only (8 AM- 5 PM  Monday thru Friday). Many of these medication will require you or a family member to pick-up a physical prescription at the office. 4. Medications other than antiinflammatories will not be called into the pharmacy after business hours.    5. Do not take Tylenol/Acetaminophen in addition to your pain medication as most pain medications already contain this ingredient. Do not exceed 4000mg of Tylenol/Acetaminophen per day. 6. You may resume the medication(s) you were taking prior to your surgery. Narcotics may change the effects of some antidepressant medication(s). If you have any questions about possible interactions between your regular medications and the pain medication, you should ask the pharmacist or contact the prescribing physician. 7. You will be discharged with prescriptions for additional pain medications (Tramadol or Toradol) and a medication for nausea and vomiting (Phenergan). You only need to fill these prescriptions if the primary pain medication is not working or you experiencing post-op nausea. 8. If you have constipation which is not improved by oral stool softeners then a Ducolax suppository should be purchased over the counter. 9. Continue the blood thinner (Aspirin or Lovenox) for a total of 30 days following surgery. Follow up appointment:    Please call our office at [phone] for your follow up appointment. This should be scheduled 14 days following the date of surgery. Physical Therapy / Nursing:  Physical Therapy following surgery will be arranged at home along with at home nursing care. They have specific instructions for rehab and wound care. .     Returning to work:    Normal return to work is 3-12 weeks following total hip replacement. Depending on your progression following surgery and specific job duties you may take longer for a full return to work. DRIVING    You should not return to driving until you are off all narcotic pain medications and able to safely and quickly apply the brakes. This is normally 3-6 weeks for left hips and 4-8 weeks for right hip. Important Signs and Symptoms:    If any of the following signs or symptoms occur, you should contact Dr. Amrita Bone office.   Please be advised if a problem arises which you feel requires immediate medical attention or you are unable to contact Dr. Yumiko Paul office you should seek immediate medical attention at the ER or other health care facility you have access to.    1. A sudden increase in swelling and/or redness or warmth at the area your surgery was performed which isnt relieved by rest, ice, and elevation. 2. Oral temperature greater than 101 degrees for 12 hours or more which isnt relieved by an increase in fluid intake and taking 2 Tylenol every 4-6 hours. 3. Excessive drainage from your incisions, or drainage which hasnt stopped by 72 hours after your surgery. 4. Fever, chills, shortness of breath, chest pain, nausea, vomiting or other signs and symptoms which are of concern to you. frequently asked questions   What should I take for pain?  o In general you will be discharged with three medications for pain (Percocet 7.5 / 325mg, Oxycodone 5mg and Tramadol). This may vary slightly depending on what you were taking in the hospital.   - 1st Line - Norco 1-2 tablets every 4-6 hrs (Or as directed).  This is the first and only medication you need to take. Initially you may need 2 tablets every 4 hours, but as your pain subsides, this will space out and taper to 1 tablet every 6 hours. - 2nd Line - Tramadol - Take this medication as directed if the Percocet and Oxycodone are not working. Once you taper off Percocet, this medication may also be used. - 3rd Line - Add Alleve 220mg every 12 hours or Motrin 400mg (200mg x 2) every 8 hours   When should I call for advice regarding my pain?  o After 12 hours on the above regimen, if nothing is working call the office ([phone] or [phone]) or call my cell after hours [phone].  Can I get refills?  o Narcotic refills are provided for the first 6 weeks following surgery. - I will generally try to taper down to a single narcotic medication by your two week appointment.    o Try Tylenol 650mg along with Alleve 220mg or Motrin 200mg during the majority of the day. - Save the narcotic pain medications for physical therapy (1 hour prior) and before sleeping at night. - Keep in mind you need to discontinue these medications prior to returning to driving.  Is swelling normal?  o Almost everyone has some degree of swelling following surgery. o Following hip and knee replacement surgery, swelling can be normal below the incision for the first 1-2 weeks. - This swelling peaks around 5-7 days after surgery.   - You may have some bruising around the back of the thigh, calf and even into the foot.  What should I do for the swelling?  o Keep the limb elevated. o Apply compression socks (knee high for total knees and up to the mid-thigh for total hips.   o Heat or ice may be applied, choose the modality that makes you the most comfortable.  How long should I remain on blood thinners following surgery?  o Thirty days   When can I drive?  o Once you have stopped using regular narcotic pain medications (Percocet, Lortab, etc.) and can safely apply the brakes without hesitation.  When can I shower? o 72hours following surgery if you have no drainage  o No submersion of the incision, bathing or swimming for 14 days following surgery or until cleared by Dr Sendy Baxter.  What do I do with the dressing when I shower?  o The dressing can be worn in the shower. Please remove the dressing only if it becomes saturated or begins to peel off.    o The incision is sealed with Dermabond (Biologic glue) and except for wounds which are draining should be watertight.  How active should I be following surgery?   o Progress activities in moderation at your own pace.   o Walk each day and set progressive goals with small increments (1st week - ½ block of walking, 2nd week - 1 block, 3rd week - 2 blocks, etc.)    Please do not hesitate to call me at [phone] (cell phone) for questions following surgery - Isaac Ricks MD    MyChart Activation    Thank you for enrolling in [address] Please follow the instructions below to securely access your online medical record. [employer] allows you to send messages to your doctor, view your test results, renew your prescriptions, schedule appointments, and more. How Do I Sign Up? 1. In your internet browser, go to [email or web address]. 2. Click on the First Time User? Click Here link in the Sign In box. You will see the New Member Sign Up page. 3. Enter your [employer] Access Code exactly as it appears below. You will not need to use this code after youve completed the sign-up process. If you do not sign up before the expiration date, you must request a new code. MyChart Access Code: Activation code not generated  Current [employer] Status: Patient Declined     4. Enter the last four digits of your Social Security Number (xxxx) and Date of Birth (mm/dd/yyyy) as indicated and click Submit. You will be taken to the next sign-up page. 5. Create a [employer] ID. This will be your [employer] login ID and cannot be changed, so think of one that is secure and easy to remember. 6. Create a [employer] password. You can change your password at any time. 7. Enter your Password Reset Question and Answer. This can be used at a later time if you forget your password. 8. Enter your e-mail address. You will receive e-mail notification when new information is available in [address] 9. Click Sign Up. You can now view your medical record. Additional Information    Remember, [employer] is NOT to be used for urgent needs. For medical emergencies, dial 911. Now available from your iPhone and Android!

## 2017-02-08 NOTE — PROGRESS NOTES
Received a copy of discharge instructions and multiple scripts which were reand explained to her and her sister.  Verbalized understanding

## 2017-02-08 NOTE — PROGRESS NOTES
2/8/2017 10:59 AM Pt's scripts were faxed to her preferred pharmacy, [address] [place] [address] at [phone]. 2/8/2017  8:05 AM [id number] Muller was notified of pt's discharge today via [facility].  JAMARCUS Greenberg

## 2017-02-08 NOTE — DISCHARGE SUMMARY
Total Hip Replacement Home Discharge Summary    Patient ID:  Corby Caballero  [id number]  74 y.o.  [id number]    Admit date: 2/6/2017    Discharge date and time: No discharge date for patient encounter. Admitting Physician: Diamante Orosco MD     Admission Diagnoses: RIGHT HIP OA  Primary localized osteoarthritis of left hip    Discharge Diagnoses: Active Problems:    Primary localized osteoarthritis of left hip (2/6/2017)        Jia Dunaway MD      HOSPITAL COURSE:  Corby Caballero was admitted on2/6/2017 and underwent successful total hip replacement. The patient was transferred to the orthopaedic floor in stable condition. The patient received the appropriate therapy while in the hospital.  Venous thrombo-embolic prophylaxis included ASA. The incision site remained clean and dry throughout the hospital stay. The patient remained neurovascularly intact. At the time of discharge, the patient was able to demonstrate an understanding of the explicit discharge precautions and instructions following surgery. Important in Hospital Events : Did well w/ therapy, moderate pain and swelling    Post Op complications: None    Current Discharge Medication List      START taking these medications    Details   oxyCODONE-acetaminophen (PERCOCET 7.5) 7.5-325 mg per tablet Take 1-2 Tabs by mouth every four (4) hours as needed for Pain. Max Daily Amount: 12 Tabs. Qty: 70 Tab, Refills: 0      traMADol (ULTRAM) 50 mg tablet Take 1 Tab by mouth every six (6) hours as needed for Pain. Max Daily Amount: 200 mg. Qty: 60 Tab, Refills: 0      oxyCODONE IR (ROXICODONE) 5 mg immediate release tablet Take 1 Tab by mouth every eight (8) hours as needed for Pain. Max Daily Amount: 15 mg.  Qty: 60 Tab, Refills: 0      ondansetron (ZOFRAN ODT) 8 mg disintegrating tablet Take 0.5 Tabs by mouth every eight (8) hours as needed for Nausea.   Qty: 30 Tab, Refills: 0      bisacodyl (DULCOLAX, BISACODYL,) 10 mg suppository Insert 10 mg into rectum daily. Qty: 2 Suppository, Refills: 0      aspirin (ASPIRIN) 325 mg tablet Take 1 Tab by mouth two (2) times a day. Qty: 60 Tab, Refills: 0         CONTINUE these medications which have NOT CHANGED    Details   melatonin tab tablet Take 5 mg by mouth nightly. Indications: 2 tabltes      glucosam-chond-msm-boron-hyal 453-70.0-322-1 mg tab Take 1 Tab by mouth daily. ibuprofen (MOTRIN) 800 mg tablet Take 800 mg by mouth every six (6) hours as needed for Pain. Indications: OSTEOARTHRITIS      hydroCHLOROthiazide (HYDRODIURIL) 25 mg tablet Take 1 Tab by mouth daily for 30 days. Qty: 30 Tab, Refills: 5    Associated Diagnoses: Essential hypertension with goal blood pressure less than 130/80      simvastatin (ZOCOR) 40 mg tablet Take 1 Tab by mouth nightly. Qty: 30 Tab, Refills: 5      multivitamin (ONE A DAY) tablet Take 1 Tab by mouth daily. FLAXSEED OIL (OMEGA 3 PO) Take 1 Cap by mouth daily. b complex vitamins tablet Take 1 Tab by mouth daily. nicotinic acid (NIACIN) 500 mg tablet Take 500 mg by mouth Daily (before breakfast). OTHER Juice plus for vegetable and fruit supplementation       aspirin delayed-release 81 mg tablet take 81 mg by mouth daily.          STOP taking these medications       HYDROcodone-acetaminophen (NORCO) 7.5-325 mg per tablet Comments:   Reason for Stopping:               Discharged to: Home      Signed:  Johnny Zamora MD  2/8/2017  7:15 AM

## 2017-02-08 NOTE — PROGRESS NOTES
Problem: Mobility Impaired (Adult and Pediatric)  Goal: *Acute Goals and Plan of Care (Insert Text)  Physical Therapy Goals  Initiated 2/6/2017    1. Patient will move from supine to sit and sit to supine in bed with independence within 4 days. 2. Patient will perform sit to stand with modified independence within 4 days. 3. Patient will ambulate with modified independence for 100 feet with the least restrictive device within 4 days. 4. Patient will ascend/descend 4 stairs with 1 handrail(s) with minimal assistance/contact guard assist within 4 days. 5. Patient will verbalize and demonstrate understanding of anterior hip precautions per protocol within 4 days. 6. Patient will perform total hip home exercise program per protocol with independence within 4 days. PHYSICAL THERAPY TREATMENT  Patient: Radu Fields (3 y.o. female)  Date: 2/8/2017  Diagnosis: RIGHT HIP OA  Primary localized osteoarthritis of left hip <principal problem not specified>  Procedure(s) (LRB):  LEFT TOTAL HIP ARTHROPLASTY DIRECT ANTERIOR APPROACH  (Left) 2 Days Post-Op  Precautions: WBAT, Fall, Total hip (anterior approach)      ASSESSMENT:  Ms. Joce Contreras has made good progress towards her functional goals. She safely completed stair training and was able to progress her ambulatory distance. Reviewed anterior hip precautions and exercises for home. She has met her acute goals and is clear for discharge with HHPT services. PLAN:  Patient has met her goals and is clear for discharge. Discharge Recommendations:  Home Health  Further Equipment Recommendations for Discharge:  None       SUBJECTIVE:   Patient stated I feel better today.       OBJECTIVE DATA SUMMARY:   Critical Behavior:  Neurologic State: Alert  Orientation Level: Oriented X4  Cognition: Appropriate decision making  Safety/Judgement: Fall prevention  Functional Mobility Training:  Bed Mobility:                    Transfers:  Sit to Stand:  Additional time;Contact guard assistance  Stand to Sit: Additional time;Contact guard assistance                             Balance:  Sitting: Intact  Standing: Intact; With support  Ambulation/Gait Training:  Distance (ft): 150 Feet (ft)  Assistive Device: Gait belt;Walker, rolling  Ambulation - Level of Assistance: Modified independent        Gait Abnormalities: Decreased step clearance     Left Side Weight Bearing: As tolerated        Speed/Luci: Pace decreased (<100 feet/min)                                Cued for increased right step length  Stairs:  Number of Stairs Trained: 8  Stairs - Level of Assistance: Modified independent  Rail Use: Left      Therapeutic Exercises:   SUPINE  EXERCISES   Sets   Reps   Active Active Assist   Passive Self ROM   Comments   Ankle Pumps     [ ]                                        [ ]                                        [ ]                                        [ ]                                        Reviewed all with hand outs provided previously   Quad Sets     [ ]                                        [ ]                                        [ ]                                        [ ]                                            Evlyn Ast     [ ]                                        [ ]                                        [ ]                                        [ ]                                            Hip Abduction     [ ]                                        [ ]                                        [ ]                                        [ ]                                            Glut Sets     [ ]                                        [ ]                                        [ ]                                        [ ]                                                  [ ]                                        [ ]                                        [ ]                                        [ ] [ ]                                        [ ]                                        [ ]                                        [ ]                                                  Pain:  Pain Scale 1: Numeric (0 - 10)  Pain Intensity 1: 2  Pain Location 1: Hip  Pain Orientation 1: Left  Pain Description 1: Sore  Pain Intervention(s) 1: Medication (see MAR) (pre pt )     After treatment:   [X] Patient left in no apparent distress sitting up in chair  [ ] Patient left in no apparent distress in bed  [X] Call bell left within reach  [X] Nursing notified  [ ] Caregiver present  [ ] Bed alarm activated      COMMUNICATION/COLLABORATION:   The patients plan of care was discussed with: Registered Nurse     Collin Arvizu, PT, DPT   Time Calculation: 29 mins

## 2017-02-08 NOTE — PROGRESS NOTES
Problem: Self Care Deficits Care Plan (Adult)  Goal: *Acute Goals and Plan of Care (Insert Text)  Occupational Therapy Goals  Initiated 2/7/2017     1. Patient will perform lower body dressing using AE at modified independence level within 7 days. 2. Patient will perform toilet transfers at modified independence level using Walkers, Type: Rolling Walker within 7 days. 3. Patient will tolerate grooming standing at the sink with modified independence within 7 days. 4. Patient will demonstrate 3/3 hip precautions without cues within 7 days. OCCUPATIONAL THERAPY TREATMENT/DISCHARGE  Patient: Judeth Cowden (27 y.o. female)  Date: 2/8/2017  Diagnosis: RIGHT HIP OA  Primary localized osteoarthritis of left hip <principal problem not specified>  Procedure(s) (LRB):  LEFT TOTAL HIP ARTHROPLASTY DIRECT ANTERIOR APPROACH  (Left) 2 Days Post-Op  Precautions: WBAT, Fall, Total hip (anterior approach)  Chart, occupational therapy assessment, plan of care, and goals were reviewed. ASSESSMENT:  Pt seated in chair and agreeable to OT. She bathed her upper body with min assist for her back. She stood to wash perineal with mod I and educated as to having walker in front of her during task for safety. Pt dressed upper body independently. She was able to don underwear and pants with mod I without AE and used sock aide to don socks. Pts shoes too tight with SILVERIO hose and socks so did not don shoes. Pt clear from an OT standpoint and recommend home health. Progression toward goals:  [X]          Improving appropriately and progressing toward goals  [ ]          Improving slowly and progressing toward goals  [ ]          Not making progress toward goals and plan of care will be adjusted       PLAN:  Patient will be discharged from occupational therapy at this time.   Rationale for discharge:  [X] Goals Achieved  [X] Wesley Owens  [ ] Patient not participating in therapy  [ ] Other:  Discharge Recommendations:  Home health  Further Equipment Recommendations for Discharge: None for OT       SUBJECTIVE:   Patient stated I have the hip kit at home.       OBJECTIVE DATA SUMMARY:   Cognitive/Behavioral Status:  Neurologic State: Alert  Orientation Level: Oriented X4  Cognition: Appropriate decision making  Safety/Judgement: Fall prevention  Functional Mobility and Transfers for ADLs:              Bed Mobility:                          Transfers:  Sit to Stand: Additional time;Contact guard assistance                                                                                                     Balance:  Sitting: Intact  Standing: Intact; With support  ADL Intervention:              Upper Body Bathing  Bathing Assistance: Minimum assistance  Position Performed: Standing  Cues: Physical assistance (to wash her back)     Lower Body Bathing  Bathing Assistance: Modified independent  Perineal  : Modified independent  Position Performed: Standing     Upper Body Dressing Assistance  Dressing Assistance: Independent  Pullover Shirt: Independent     Lower Body Dressing Assistance  Underpants: Modified independent  Pants With Elastic Waist: Modified independent  Socks: Modified independent  Slip on Shoes with Back: Moderate assistance (for L shoe)  Leg Crossed Method Used: No  Position Performed: Seated in chair  Adaptive Equipment Used: Dressing stick; Long handled shoe horn;Reacher;Sock aid              Pain:  Pain Scale 1: Numeric (0 - 10)  Pain Intensity 1: 2  Pain Location 1: Hip  Pain Orientation 1: Left  Pain Description 1: Sore  Pain Intervention(s) 1: Medication (see MAR) (pre pt )     Activity Tolerance:    No signs/symptoms of distress or discomfort during OT  Please refer to the flowsheet for vital signs taken during this treatment.   After treatment:   [X]  Patient left in no apparent distress sitting up in chair  [ ]  Patient left in no apparent distress in bed  [X]  Call bell left within reach  [ ]  Nursing notified  [X] Caregiver present  [ ]  Bed alarm activated      COMMUNICATION/COLLABORATION:   The patients plan of care was discussed with: Occupational Therapist     JEREMIAH Ballard/L  Time Calculation: 34 mins

## 2017-02-08 NOTE — PROGRESS NOTES
Rounded on [facility] patients and provided Anointing  of the Sick and Communion at request of patient    FrJoan Webb

## 2017-02-08 NOTE — PROGRESS NOTES
Bedside and Verbal shift change report given to Noam Mesa (oncoming nurse) by Francisco Messina (offgoing nurse). Report included the following information SBAR, Kardex and Recent Results.

## 2017-02-08 NOTE — PROGRESS NOTES
Bedside and Verbal shift change report given to Jon Peoples (oncoming nurse) by Abimael Lange RN (offgoing nurse). Report included the following information SBAR, Kardex, MAR and Recent Results.

## 2017-02-09 ENCOUNTER — HOME CARE VISIT (OUTPATIENT)
Dept: SCHEDULING | Facility: HOME HEALTH | Age: 67
End: 2017-02-09
Payer: MEDICARE

## 2017-02-09 VITALS
SYSTOLIC BLOOD PRESSURE: 130 MMHG | DIASTOLIC BLOOD PRESSURE: 90 MMHG | RESPIRATION RATE: 16 BRPM | OXYGEN SATURATION: 98 % | HEART RATE: 80 BPM | TEMPERATURE: 98.3 F

## 2017-02-09 PROCEDURE — 400013 HH SOC

## 2017-02-09 PROCEDURE — 3331090002 HH PPS REVENUE DEBIT

## 2017-02-09 PROCEDURE — G0152 HHCP-SERV OF OT,EA 15 MIN: HCPCS

## 2017-02-09 PROCEDURE — 3331090001 HH PPS REVENUE CREDIT

## 2017-02-09 PROCEDURE — G0151 HHCP-SERV OF PT,EA 15 MIN: HCPCS

## 2017-02-10 ENCOUNTER — TELEPHONE (OUTPATIENT)
Dept: INTERNAL MEDICINE CLINIC | Age: 67
End: 2017-02-10

## 2017-02-10 ENCOUNTER — PATIENT OUTREACH (OUTPATIENT)
Dept: INTERNAL MEDICINE CLINIC | Age: 67
End: 2017-02-10

## 2017-02-10 ENCOUNTER — HOME CARE VISIT (OUTPATIENT)
Dept: SCHEDULING | Facility: HOME HEALTH | Age: 67
End: 2017-02-10
Payer: MEDICARE

## 2017-02-10 VITALS
SYSTOLIC BLOOD PRESSURE: 122 MMHG | RESPIRATION RATE: 16 BRPM | DIASTOLIC BLOOD PRESSURE: 60 MMHG | HEART RATE: 72 BPM | TEMPERATURE: 98.7 F | OXYGEN SATURATION: 98 %

## 2017-02-10 VITALS
TEMPERATURE: 98.1 F | RESPIRATION RATE: 16 BRPM | OXYGEN SATURATION: 98 % | HEART RATE: 72 BPM | SYSTOLIC BLOOD PRESSURE: 130 MMHG | DIASTOLIC BLOOD PRESSURE: 80 MMHG

## 2017-02-10 PROCEDURE — 3331090002 HH PPS REVENUE DEBIT

## 2017-02-10 PROCEDURE — 3331090001 HH PPS REVENUE CREDIT

## 2017-02-10 PROCEDURE — G0151 HHCP-SERV OF PT,EA 15 MIN: HCPCS

## 2017-02-10 NOTE — TELEPHONE ENCOUNTER
L/vm for advised we can provide order for assistance but anything needs to be reviewed as ortho is involved.

## 2017-02-10 NOTE — PROGRESS NOTES
[address] [facility]    Patient on discharge report dated 2/8/17 from [facility] for a scheduled procedure from [facility]. Admission dates: 2/6/17 - 2/8/17. Procedure: Procedure(s):  LEFT TOTAL HIP ARTHROPLASTY DIRECT ANTERIOR APPROACH   Surgeon: Sonia Morales MD      Left message on voicemail to return call and to make a f/u appt if needed. Will attempt to contact again. Need to complete post-discharge assessment.

## 2017-02-10 NOTE — TELEPHONE ENCOUNTER
Winter from [facility] is requesting an  aid twice a week for 2weeks for patient. Call winter back at  [phone].   The aid would start on 2/12/17

## 2017-02-11 PROCEDURE — 3331090001 HH PPS REVENUE CREDIT

## 2017-02-11 PROCEDURE — 3331090002 HH PPS REVENUE DEBIT

## 2017-02-12 ENCOUNTER — HOME CARE VISIT (OUTPATIENT)
Dept: SCHEDULING | Facility: HOME HEALTH | Age: 67
End: 2017-02-12
Payer: MEDICARE

## 2017-02-12 VITALS
SYSTOLIC BLOOD PRESSURE: 122 MMHG | HEART RATE: 85 BPM | OXYGEN SATURATION: 96 % | RESPIRATION RATE: 16 BRPM | DIASTOLIC BLOOD PRESSURE: 80 MMHG | TEMPERATURE: 97.3 F

## 2017-02-12 PROCEDURE — 3331090001 HH PPS REVENUE CREDIT

## 2017-02-12 PROCEDURE — G0152 HHCP-SERV OF OT,EA 15 MIN: HCPCS

## 2017-02-12 PROCEDURE — 3331090002 HH PPS REVENUE DEBIT

## 2017-02-13 ENCOUNTER — HOME CARE VISIT (OUTPATIENT)
Dept: SCHEDULING | Facility: HOME HEALTH | Age: 67
End: 2017-02-13
Payer: MEDICARE

## 2017-02-13 PROCEDURE — G0151 HHCP-SERV OF PT,EA 15 MIN: HCPCS

## 2017-02-13 PROCEDURE — 3331090002 HH PPS REVENUE DEBIT

## 2017-02-13 PROCEDURE — 3331090001 HH PPS REVENUE CREDIT

## 2017-02-14 ENCOUNTER — HOME CARE VISIT (OUTPATIENT)
Dept: SCHEDULING | Facility: HOME HEALTH | Age: 67
End: 2017-02-14
Payer: MEDICARE

## 2017-02-14 VITALS
TEMPERATURE: 98.1 F | SYSTOLIC BLOOD PRESSURE: 122 MMHG | OXYGEN SATURATION: 98 % | RESPIRATION RATE: 16 BRPM | DIASTOLIC BLOOD PRESSURE: 70 MMHG

## 2017-02-14 PROCEDURE — 3331090002 HH PPS REVENUE DEBIT

## 2017-02-14 PROCEDURE — G0156 HHCP-SVS OF AIDE,EA 15 MIN: HCPCS

## 2017-02-14 PROCEDURE — 3331090001 HH PPS REVENUE CREDIT

## 2017-02-15 ENCOUNTER — HOME CARE VISIT (OUTPATIENT)
Dept: SCHEDULING | Facility: HOME HEALTH | Age: 67
End: 2017-02-15
Payer: MEDICARE

## 2017-02-15 PROCEDURE — G0151 HHCP-SERV OF PT,EA 15 MIN: HCPCS

## 2017-02-15 PROCEDURE — 3331090001 HH PPS REVENUE CREDIT

## 2017-02-15 PROCEDURE — 3331090002 HH PPS REVENUE DEBIT

## 2017-02-16 ENCOUNTER — HOME CARE VISIT (OUTPATIENT)
Dept: SCHEDULING | Facility: HOME HEALTH | Age: 67
End: 2017-02-16
Payer: MEDICARE

## 2017-02-16 VITALS
TEMPERATURE: 98.9 F | OXYGEN SATURATION: 94 % | HEART RATE: 76 BPM | DIASTOLIC BLOOD PRESSURE: 70 MMHG | SYSTOLIC BLOOD PRESSURE: 122 MMHG | RESPIRATION RATE: 16 BRPM

## 2017-02-16 PROCEDURE — G0152 HHCP-SERV OF OT,EA 15 MIN: HCPCS

## 2017-02-16 PROCEDURE — 3331090002 HH PPS REVENUE DEBIT

## 2017-02-16 PROCEDURE — 3331090001 HH PPS REVENUE CREDIT

## 2017-02-17 ENCOUNTER — HOME CARE VISIT (OUTPATIENT)
Dept: SCHEDULING | Facility: HOME HEALTH | Age: 67
End: 2017-02-17
Payer: MEDICARE

## 2017-02-17 VITALS
HEART RATE: 68 BPM | DIASTOLIC BLOOD PRESSURE: 78 MMHG | TEMPERATURE: 98.1 F | RESPIRATION RATE: 16 BRPM | OXYGEN SATURATION: 98 % | SYSTOLIC BLOOD PRESSURE: 130 MMHG

## 2017-02-17 PROCEDURE — 3331090002 HH PPS REVENUE DEBIT

## 2017-02-17 PROCEDURE — 3331090001 HH PPS REVENUE CREDIT

## 2017-02-17 PROCEDURE — G0156 HHCP-SVS OF AIDE,EA 15 MIN: HCPCS

## 2017-02-17 PROCEDURE — G0151 HHCP-SERV OF PT,EA 15 MIN: HCPCS

## 2017-02-18 PROCEDURE — 3331090002 HH PPS REVENUE DEBIT

## 2017-02-18 PROCEDURE — 3331090001 HH PPS REVENUE CREDIT

## 2017-02-19 PROCEDURE — 3331090001 HH PPS REVENUE CREDIT

## 2017-02-19 PROCEDURE — 3331090002 HH PPS REVENUE DEBIT

## 2017-02-20 ENCOUNTER — HOME CARE VISIT (OUTPATIENT)
Dept: SCHEDULING | Facility: HOME HEALTH | Age: 67
End: 2017-02-20
Payer: MEDICARE

## 2017-02-20 VITALS
TEMPERATURE: 98.6 F | RESPIRATION RATE: 16 BRPM | SYSTOLIC BLOOD PRESSURE: 153 MMHG | HEART RATE: 75 BPM | DIASTOLIC BLOOD PRESSURE: 82 MMHG | OXYGEN SATURATION: 96 %

## 2017-02-20 VITALS
RESPIRATION RATE: 16 BRPM | DIASTOLIC BLOOD PRESSURE: 78 MMHG | SYSTOLIC BLOOD PRESSURE: 138 MMHG | HEART RATE: 80 BPM | TEMPERATURE: 98.1 F | OXYGEN SATURATION: 98 %

## 2017-02-20 PROCEDURE — 3331090002 HH PPS REVENUE DEBIT

## 2017-02-20 PROCEDURE — G0152 HHCP-SERV OF OT,EA 15 MIN: HCPCS

## 2017-02-20 PROCEDURE — G0151 HHCP-SERV OF PT,EA 15 MIN: HCPCS

## 2017-02-20 PROCEDURE — 3331090001 HH PPS REVENUE CREDIT

## 2017-02-21 ENCOUNTER — HOME CARE VISIT (OUTPATIENT)
Dept: HOME HEALTH SERVICES | Facility: HOME HEALTH | Age: 67
End: 2017-02-21
Payer: MEDICARE

## 2017-02-21 PROCEDURE — 3331090001 HH PPS REVENUE CREDIT

## 2017-02-21 PROCEDURE — 3331090002 HH PPS REVENUE DEBIT

## 2017-02-22 ENCOUNTER — HOME CARE VISIT (OUTPATIENT)
Dept: SCHEDULING | Facility: HOME HEALTH | Age: 67
End: 2017-02-22
Payer: MEDICARE

## 2017-02-22 VITALS
RESPIRATION RATE: 16 BRPM | HEART RATE: 75 BPM | SYSTOLIC BLOOD PRESSURE: 145 MMHG | DIASTOLIC BLOOD PRESSURE: 80 MMHG | OXYGEN SATURATION: 95 %

## 2017-02-22 PROCEDURE — G0152 HHCP-SERV OF OT,EA 15 MIN: HCPCS

## 2017-02-22 PROCEDURE — 3331090001 HH PPS REVENUE CREDIT

## 2017-02-22 PROCEDURE — 3331090002 HH PPS REVENUE DEBIT

## 2017-02-22 PROCEDURE — G0151 HHCP-SERV OF PT,EA 15 MIN: HCPCS

## 2017-02-28 ENCOUNTER — HOSPITAL ENCOUNTER (OUTPATIENT)
Dept: PHYSICAL THERAPY | Age: 67
Discharge: HOME | End: 2017-02-28
Payer: COMMERCIAL

## 2017-02-28 PROCEDURE — 97110 THERAPEUTIC EXERCISES: CPT

## 2017-02-28 PROCEDURE — 97161 PT EVAL LOW COMPLEX 20 MIN: CPT

## 2017-02-28 NOTE — PROGRESS NOTES
PT INITIAL EVALUATION NOTE - [facility] 2-15    Patient Name: Boone Rivas  Date:2017  : 1950  [x]  Patient  Verified  Payor: Basilia Davison / Plan: Murleen Cogan / Product Type: PPO /    In time: 1145 AM  Out time:12:30 PM  Total Treatment Time (min): 45  Visit #: 1     Treatment Area: Left hip pain [M25.552]    SUBJECTIVE  Pain Level (0-10 scale): 2/10  Any medication changes, allergies to medications, adverse drug reactions, diagnosis change, or new procedure performed?: [] No    [x] Yes (see summary sheet for update)  Subjective:    Pt presents s/p L DAISY ant approach, DOS 2017; states surgery went well; in hospital for two days, home health physical therapy ~8 total visits, exercises going well, also occupational therapy home visits. F/u w/ Dr. Velez Anchors last week, xray normal, no specific restrictions currently    Pt reports prior to surgery hip pain for ~2 years, PT and injection- prior to decision to have surgery; pt w/ report of LLD R shorter L, typically wears lift in R shoe over last ~ 6 months      Pt also w/ c/o R knee pain, \"better\" since surgery, had gradually worsened in last 1-2 years, feels due to compensation from hip pain; describes episode right knee \"giving out\" 2016, saw Dr. Niurka Crespo, xray found arthritis, given injection which signif decr sx. Pt w/ hx of ax debridement/menisectomy bilat knee ~ . No c/o pain in left knee at this time. Date of surgery:  2017    Pain:   7/10 max 2/10 min 2/10 now     Aggravated by:  end of day, incr activity, sleeping at night    Eased by:  Rest, ice ~2x/ day.      Diagnostic Tests: [] Lab work [x] X-rays    [] CT [] MRI     [] Other:  Results (per report of the patient): post surgery normal     PMHX: Significant for arthritits, HTN, hepatitis - A ~ , (appendectomy ~, b/l knee ax debridement/meniscetomy ~     Social/Recreation/Work:     Pt lives alone, 13 steps to second level w/ 1 hand rail on left side going up, bedroom on second floor; reports currently step over step pattern ~4-5 steps before returning to step to pattern. 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 Current HEP - heel raise, standing march, lat step, SLS, squat, seated hip flex, glut squeeze, quad set, ankle pumps, AROM heel slide. Prior level of function: Walk dog for 30min 2x / day without restrictions. Ascend and descend stairs without discomfort. Patient goal(s): \"Walk my dog 30 min x 2 daily. Return to weekending hiking. Return to full functionality. OBJECTIVE/EXAMINATION    Posture: Seated posture WNL. Lumb/pelvic alignment/movement: L hip higher R in stance     Gait: Minor antalgic gait present with increased left pelvic rotation during left stance phase. ROM/Strength        AAROM                  Strength (1-5)  Hip Right Left Right Left   Flexion nt 96 5 <3 pain   Extension nt nt nt nt   Abduction nt nt nt 3 pain   Adduction nt nt nt nt   ER nt nt nt 3 pain   IR nt nt nt nt   Knee Right Left Right Left   Extension WNL WNL 5 4   Flexion WNL WNL 5 4     Comments/Other:  Abdominal brace fair activation, poor control while breathing   Bridge  with no reports of pain, b/l minor hip drop. Repeated bridge x 5 reps moderate b/l hip instability/ drop L>R. Bridge w/ march nt     Functional Biomechanical Screen  SLS: L 5 sec LOB. R >10 sec.    Bilateral Squat: partial squat; knees over toes mild genu valgus   Repeated single limb squat x 5 reps; reports of minor increase in right knee pain; after correction of body mechanics pt able to return pain free demonstration with overall improved body mechanics. Palpation  [] Min  [x] Mod  [] Severe    Location: TTP and guarding noted left piriformis, glute med and QL mucles      Outcome Measure: Patient presents with an initial FOTO score of 57. Modality rationale: decrease edema, decrease inflammation, decrease pain, increase tissue extensibility and increase muscle contraction/control to improve the patients ability to Walk dog for 30min 2x / day without restrictions. Ascend and descend stairs without discomfort. Min Type Additional Details    [] Estim: []Att   []Unatt        []TENS instruct                  []IFC  []Premod   []NMES                     []Other:  []w/US   []w/ice   []w/heat  Position:  Location:    []  Traction: [] Cervical       []Lumbar                       [] Prone          []Supine                       []Intermittent   []Continuous Lbs:  [] before manual  [] after manual  []w/heat    []  Ultrasound: []Continuous   [] Pulsed at:                           []1MHz   []3MHz Location:  W/cm2:    [] Paraffin         Location:   []w/heat   Take home [x]  Ice     []  Heat  []  Ice massage Position:  Location: left hip    []  Laser  []  Other: Position:  Location:      []  Vasopneumatic Device Pressure:       [] lo [] med [] hi   Temperature:      [] Skin assessment post-treatment:  [x]intact []redness- no adverse reaction    []redness  adverse reaction:     10 min Therapeutic Exercise:  [x] See flow sheet : Reviewed current HEP. Pt instructed in additional exercises for HEP and able to return demonstration.    Rationale: increase ROM, increase strength, improve coordination, improve balance and increase proprioception to improve the patients ability to Walk dog for 30min 2x / day without restrictions. Ascend and descend stairs without discomfort.            With   [x] TE   [] TA   [] neuro   [] other: Patient Education: [x] Review HEP    [] Progressed/Changed HEP based on:   [x] positioning   [x] body mechanics   [] transfers   [x] heat/ice application    [] other:        Pain Level (0-10 scale) post treatment: 2/10    ASSESSMENT/Changes in Function:     [x]  See Plan of [address] 2/28/2017  11:43 AM

## 2017-02-28 NOTE — PROGRESS NOTES
[facility]  [address]  [place], [address]  Phone: [phone]  Fax: [phone]    Plan of Care/Statement of Necessity for Physical Therapy Services  2-15    Patient name: Jessica Brady  : 1950  Provider#: [id number]  Referral source: Ave Arredondo MD      Medical/Treatment Diagnosis: Left hip pain [M25.552]     Prior Hospitalization: see medical history     Comorbidities: Significant for arthritits, HTN, hepatitis - A ~ , (appendectomy ~, b/l knee ax debridement/meniscetomy ~ . Prior Level of Function: Walk dog for 30min 2x / day without restrictions. Ascend and descend stairs without discomfort. Medications: Verified on Patient Summary List    Start of Care: 2017      Onset Date: ~2 years prior, DOS 2017      The Plan of Care and following information is based on the information from the initial evaluation. Assessment/ key information: 69year old female presents s/p L DAISY ant approach, DOS 2017.     Evaluation Complexity History MEDIUM  Complexity : 1-2 comorbidities / personal factors will impact the outcome/ POC ; Examination LOW Complexity : 1-2 Standardized tests and measures addressing body structure, function, activity limitation and / or participation in recreation  ;Presentation LOW Complexity : Stable, uncomplicated  ;Clinical Decision Making MEDIUM Complexity : FOTO score of 26-74  Overall Complexity Rating: LOW     Problem List: pain affecting function, decrease ROM, decrease strength, edema affecting function, impaired gait/ balance, decrease ADL/ functional abilitiies, decrease activity tolerance, decrease flexibility/ joint mobility and decrease transfer abilities   Treatment Plan may include any combination of the following: Therapeutic exercise, Therapeutic activities, Neuromuscular re-education, Physical agent/modality, Gait/balance training, Manual therapy, Patient education, Self Care training, Functional mobility training, Home safety training and Stair training  Patient / Family readiness to learn indicated by: asking questions, trying to perform skills and interest  Persons(s) to be included in education: patient (P)  Barriers to Learning/Limitations: None  Patient Goal (s): Walk my dog 30 min x 2 daily. Return to weekending hiking. Return to full functionality.   Patient Self Reported Health Status: good  Rehabilitation Potential: excellent    Short Term Goals: To be accomplished in 2 weeks:   1. Pt will be independent with HEP to allow continued progression of functional activities. 2. Pt will report >/= 25% decrease in symptoms to increase quality of life. 3. Stand greater than 15 minutes without increase of pain so that can complete light household chores such as cleaning dishes. 4. Ambulate greater than 2 blocks without increase of pain to allow continued progression of walking program.    5. Pt will demonstrate proper abdominal brace sequencing and ability to hold >/= 10 seconds for improved core strength. Long Term Goals: To be accomplished in 6-8 weeks:   1. Pt will be able to stand greater than 60 minutes without increase pain so that can to allow household cleaning without restrictions. 2. Pt will be able to ambulate greater than 6 blocks without increase of pain to allow return to previous walking routine and increase general wellness. 3. Pt will demonstrate independence with modified exercise/gym routine without aggravation of symptoms. 4. Pt will report able to sleep through night without pain to increase quality of life. 5. Pt will be able to perform 10 consecutive bridge w/ march w/out hip drop to demonstrate improved core strength in stance. 6. Pt will demonstrate step over step pattern while ascending and descending 13 steps to reduce restrictions while at home. Frequency / Duration: Patient to be seen 1-2 times per week for 6-8 weeks.     Patient/ Caregiver education and instruction: self care, activity modification and exercises    [x]  Plan of care has been reviewed with LEWIS Paredes 2/28/2017 3:41 PM    ________________________________________________________________________    I certify that the above Therapy Services are being furnished while the patient is under my care. I agree with the treatment plan and certify that this therapy is necessary.     [de-identified] Signature:____________________  Date:____________Time: _________

## 2017-03-06 ENCOUNTER — HOSPITAL ENCOUNTER (OUTPATIENT)
Dept: PHYSICAL THERAPY | Age: 67
Discharge: HOME | End: 2017-03-06
Payer: COMMERCIAL

## 2017-03-06 PROCEDURE — 97110 THERAPEUTIC EXERCISES: CPT | Performed by: PHYSICAL THERAPY ASSISTANT

## 2017-03-06 PROCEDURE — 97140 MANUAL THERAPY 1/> REGIONS: CPT | Performed by: PHYSICAL THERAPY ASSISTANT

## 2017-03-06 NOTE — PROGRESS NOTES
PT DAILY TREATMENT NOTE 2-15    Patient Name: Vipul Johnson  Date:3/6/2017  : 1950  [x]  Patient  Verified  Payor: Lino Almonte / Plan: Ja Orr / Product Type: PPO /    In time: 10:30 AM  Out time:11:35 AM  Total Treatment Time (min): 65  Visit #: 2   RTMD: 3/31/17    Treatment Area: Left hip pain [M25.552]    SUBJECTIVE  Pain Level (0-10 scale): 3/10  Any medication changes, allergies to medications, adverse drug reactions, diagnosis change, or new procedure performed?: [x] No    [] Yes (see summary sheet for update)  Subjective functional status/changes:   [] No changes reported  Patient reports compliance with HEP and ice application on L hip. States she walked her dog over the weekend, notes increased soreness in L hip and L knee. OBJECTIVE    Modality rationale: decrease edema, decrease inflammation and decrease pain to improve the patients ability to Walk dog for 30min 2x / day without restrictions. Ascend and descend stairs without discomfort.     Min Type Additional Details    [] Estim: []Att   []Unatt        []TENS instruct                  []IFC  []Premod   []NMES                     []Other:  []w/US   []w/ice   []w/heat  Position:  Location:    []  Traction: [] Cervical       []Lumbar                       [] Prone          []Supine                       []Intermittent   []Continuous Lbs:  [] before manual  [] after manual  []w/heat    []  Ultrasound: []Continuous   [] Pulsed at:                            []1MHz   []3MHz Location:  W/cm2:    []  Paraffin         Location:  []w/heat   10 [x]  Ice     []  Heat  []  Ice massage Position: supine with LE's elevated on wedge  Location: L anterior hip and L knee    []  Laser  []  Other: Position:  Location:    []  Vasopneumatic Device Pressure:       [] lo [] med [] hi   Temperature:    [x] Skin assessment post-treatment:  [x]intact []redness- no adverse reaction    []redness  adverse reaction:     45 min Therapeutic Exercise:  [x] See flow sheet : reviewed HEP, added gastroc stretch at wedge, HR, standing marches and hip abduction, SAQ with 2 pound weight   Rationale: increase ROM, increase strength, improve balance and increase proprioception to improve the patients ability to Walk dog for 30min 2x / day without restrictions. Ascend and descend stairs without discomfort. 10 min Manual Therapy:  STM/TPR L glut med, piriformis and QL muscles in R side lying   Rationale: decrease pain, increase ROM, increase tissue extensibility and decrease trigger points  to improve the patients ability to Walk dog for 30min 2x / day without restrictions. Ascend and descend stairs without discomfort. With   [x] TE   [] TA   [] neuro   [] other: Patient Education: [x] Review HEP    [] Progressed/Changed HEP based on:   [] positioning   [] body mechanics   [] transfers   [] heat/ice application    [x] other: discussed icing L knee to reduce pain/inflammation, advised to gradually increase walking distance vs. Walking great distances at one time to avoid aggravation of symptoms     Other Objective/Functional Measures: mod TTP L glut med, piriformis and QL     Pain Level (0-10 scale) post treatment: patient did not report    ASSESSMENT/Changes in Function:   Patient tolerated standing exercises well without increase in L hip or knee pain. Advised to gradually increase walking distance but to pace herself as she is still healing from surgery. Encouraged to ice hip and knees at home to reduce pain/inflammation. Patient will continue to benefit from skilled PT services to modify and progress therapeutic interventions, address functional mobility deficits, address ROM deficits, address strength deficits, analyze and cue movement patterns and instruct in home and community integration to attain remaining goals.      []  See Plan of Care  []  See progress note/recertification  []  See Discharge Summary         Progress towards goals / Updated goals:  nt    PLAN  []  Upgrade activities as tolerated     [x]  Continue plan of care  []  Update interventions per flow sheet       []  Discharge due to:_  []  Other:_      Alejandro Vasquez PTA 3/6/2017  10:30 AM

## 2017-03-08 ENCOUNTER — HOSPITAL ENCOUNTER (OUTPATIENT)
Dept: PHYSICAL THERAPY | Age: 67
Discharge: HOME | End: 2017-03-08
Payer: COMMERCIAL

## 2017-03-08 PROCEDURE — 97140 MANUAL THERAPY 1/> REGIONS: CPT

## 2017-03-08 PROCEDURE — 97110 THERAPEUTIC EXERCISES: CPT

## 2017-03-08 NOTE — PROGRESS NOTES
PT DAILY TREATMENT NOTE 2-15    Patient Name: Vince Mayfield  Date:3/8/2017  : 1950  [x]  Patient  Verified  Payor: Palmer Avers / Plan: Chula Milling / Product Type: PPO /    In time: 1:35 PM  Out time: 250 PM  Total Treatment Time (min): 75  Visit #: 3  RTMD: 3/31/17    Treatment Area: Left hip pain [M25.552]    SUBJECTIVE  Pain Level (0-10 scale): 2-3/10  Any medication changes, allergies to medications, adverse drug reactions, diagnosis change, or new procedure performed?: [x] No    [] Yes (see summary sheet for update)  Subjective functional status/changes:   [] No changes reported  Patient reports \"ache\" type pain L lat hip reg; cont pain bilat knees; also w/ report of R knee occas \"catching\" which requires repositioning to move back in to place    OBJECTIVE    Modality rationale: decrease edema, decrease inflammation and decrease pain to improve the patients ability to Walk dog for 30min 2x / day without restrictions. Ascend and descend stairs without discomfort.     Min Type Additional Details    [] Estim: []Att   []Unatt        []TENS instruct                  []IFC  []Premod   []NMES                     []Other:  []w/US   []w/ice   []w/heat  Position:  Location:    []  Traction: [] Cervical       []Lumbar                       [] Prone          []Supine                       []Intermittent   []Continuous Lbs:  [] before manual  [] after manual  []w/heat    []  Ultrasound: []Continuous   [] Pulsed at:                            []1MHz   []3MHz Location:  W/cm2:    []  Paraffin         Location:  []w/heat   10 [x]  Ice     []  Heat  []  Ice massage Position: supine with LE's elevated on wedge  Location: L anterior hip and L knee    []  Laser  []  Other: Position:  Location:    []  Vasopneumatic Device Pressure:       [] lo [] med [] hi   Temperature:    [x] Skin assessment post-treatment:  [x]intact []redness- no adverse reaction    []redness  adverse reaction:     50 min Therapeutic Exercise:  [x] See flow sheet : added seated elliptical, SLR, sup hip abd w/ band, SLS clock,     Rationale: increase ROM, increase strength, improve balance and increase proprioception to improve the patients ability to Walk dog for 30min 2x / day without restrictions. Ascend and descend stairs without discomfort. 10 min Manual Therapy:  STM/TPR L glut med, piriformis and QL muscles in R side lying   Rationale: decrease pain, increase ROM, increase tissue extensibility and decrease trigger points  to improve the patients ability to Walk dog for 30min 2x / day without restrictions. Ascend and descend stairs without discomfort. With   [x] TE   [] TA   [] neuro   [] other: Patient Education: [x] Review HEP    [] Progressed/Changed HEP based on:   [] positioning   [] body mechanics   [] transfers   [x] heat/ice application    [] other:      Other Objective/Functional Measures: mod TTP L glut med, piriformis and QL     Pain Level (0-10 scale) post treatment: 0/10    ASSESSMENT/Changes in Function:      Cueing for proper muscle activation w/ SLR & SAQ, required focus but pt able to perform after cueing; cueing to maintain level hips and proper alignment w/ standing hip exercises; performed well w/ SLS clock; pt w/ R knee discomfort w/ SAQ & minisquat - advised pt to hold from ex that aggravate R knee at this time as it is likely aggravated by over utilization due to compensation of L LE weakness; incorporate R side balance/proprioception and quad/hip strengthening ex to assist w/ R knee discomfort     Patient will continue to benefit from skilled PT services to modify and progress therapeutic interventions, address functional mobility deficits, address ROM deficits, address strength deficits, analyze and cue movement patterns and instruct in home and community integration to attain remaining goals.      []  See Plan of Care  []  See progress note/recertification  []  See Discharge Summary Progress towards goals / Updated goals:  nt    PLAN  []  Upgrade activities as tolerated     [x]  Continue plan of care  []  Update interventions per flow sheet       []  Discharge due to:_  []  Other:_      Bakari Goldberg, PT 3/8/2017  404 PM

## 2017-03-14 ENCOUNTER — HOSPITAL ENCOUNTER (OUTPATIENT)
Dept: PHYSICAL THERAPY | Age: 67
Discharge: HOME | End: 2017-03-14
Payer: COMMERCIAL

## 2017-03-14 PROCEDURE — 97140 MANUAL THERAPY 1/> REGIONS: CPT

## 2017-03-14 PROCEDURE — 97110 THERAPEUTIC EXERCISES: CPT

## 2017-03-14 NOTE — PROGRESS NOTES
PT DAILY TREATMENT NOTE 2-15    Patient Name: Miroslava Herrera  Date:3/14/2017  : 1950  [x]  Patient  Verified  Payor: Orlando Lou / Plan: Darien Thakkar / Product Type: PPO /    In time: 1110 AM  Out time: 4255 PM  Total Treatment Time (min): 70  Visit #: 4  RTMD: 3/31/17    Treatment Area: Left hip pain [M25.552]    SUBJECTIVE  Pain Level (0-10 scale): 1/10  Any medication changes, allergies to medications, adverse drug reactions, diagnosis change, or new procedure performed?: [x] No    [] Yes (see summary sheet for update)  Subjective functional status/changes:   [] No changes reported  Patient reports cont to have R knee pain; L hip \"ache\"    OBJECTIVE    Modality rationale: decrease edema, decrease inflammation and decrease pain to improve the patients ability to Walk dog for 30min 2x / day without restrictions. Ascend and descend stairs without discomfort.     Min Type Additional Details    [] Estim: []Att   []Unatt        []TENS instruct                  []IFC  []Premod   []NMES                     []Other:  []w/US   []w/ice   []w/heat  Position:  Location:    []  Traction: [] Cervical       []Lumbar                       [] Prone          []Supine                       []Intermittent   []Continuous Lbs:  [] before manual  [] after manual  []w/heat    []  Ultrasound: []Continuous   [] Pulsed at:                            []1MHz   []3MHz Location:  W/cm2:    []  Paraffin         Location:  []w/heat   10 [x]  Ice     []  Heat  []  Ice massage Position: supine with LE's elevated on wedge  Location: L anterior hip and L knee    []  Laser  []  Other: Position:  Location:    []  Vasopneumatic Device Pressure:       [] lo [] med [] hi   Temperature:    [x] Skin assessment post-treatment:  [x]intact []redness- no adverse reaction    []redness  adverse reaction:     45 min Therapeutic Exercise:  [x] See flow sheet : modify ex to avoid aggravating R knee, add standing hamstring curl Rationale: increase ROM, increase strength, improve balance and increase proprioception to improve the patients ability to Walk dog for 30min 2x / day without restrictions. Ascend and descend stairs without discomfort. 10 min Manual Therapy:  STM/TPR L glut med, piriformis and QL muscles in R side lying   Rationale: decrease pain, increase ROM, increase tissue extensibility and decrease trigger points  to improve the patients ability to Walk dog for 30min 2x / day without restrictions. Ascend and descend stairs without discomfort. With   [x] TE   [] TA   [] neuro   [] other: Patient Education: [x] Review HEP    [] Progressed/Changed HEP based on:   [] positioning   [] body mechanics   [] transfers   [x] heat/ice application    [] other:      Other Objective/Functional Measures: mod TTP L glut med, piriformis and QL     Pain Level (0-10 scale) post treatment: 0/10    ASSESSMENT/Changes in Function:      Modified ex to avoid aggravating knee pain; educated pt re: use of FR or massage stick for home     Patient will continue to benefit from skilled PT services to modify and progress therapeutic interventions, address functional mobility deficits, address ROM deficits, address strength deficits, analyze and cue movement patterns and instruct in home and community integration to attain remaining goals.      []  See Plan of Care  []  See progress note/recertification  []  See Discharge Summary         Progress towards goals / Updated goals:  nt    PLAN  []  Upgrade activities as tolerated     [x]  Continue plan of care  []  Update interventions per flow sheet       []  Discharge due to:_  []  Other:_      Elaine Snyder, PT 3/14/2017   1114 AM

## 2017-03-16 ENCOUNTER — HOSPITAL ENCOUNTER (OUTPATIENT)
Dept: PHYSICAL THERAPY | Age: 67
Discharge: HOME | End: 2017-03-16
Payer: COMMERCIAL

## 2017-03-16 PROCEDURE — 97110 THERAPEUTIC EXERCISES: CPT | Performed by: PHYSICAL THERAPY ASSISTANT

## 2017-03-16 NOTE — PROGRESS NOTES
PT DAILY TREATMENT NOTE 2-15    Patient Name: Juan Hope  Date:3/16/2017  : 1950  [x]  Patient  Verified  Payor: [employer] D[employer] / Plan: Martín Kwong / Product Type: PPO /    In time: 11:05 AM  Out time: 12:05 PM  Total Treatment Time (min): 60  Visit #: 4  RTMD: 3/31/17    Treatment Area: Left hip pain [M25.552]    SUBJECTIVE  Pain Level (0-10 scale): 1/10  Any medication changes, allergies to medications, adverse drug reactions, diagnosis change, or new procedure performed?: [x] No    [] Yes (see summary sheet for update)  Subjective functional status/changes:   [] No changes reported  Patient reports \"It's the knees that are getting me, not my hip. \" Patient states she needs to be on a conference call by 12pm.     OBJECTIVE    Modality rationale: decrease edema, decrease inflammation and decrease pain to improve the patients ability to Walk dog for 30min 2x / day without restrictions. Ascend and descend stairs without discomfort.     Min Type Additional Details    [] Estim: []Att   []Unatt        []TENS instruct                  []IFC  []Premod   []NMES                     []Other:  []w/US   []w/ice   []w/heat  Position:  Location:    []  Traction: [] Cervical       []Lumbar                       [] Prone          []Supine                       []Intermittent   []Continuous Lbs:  [] before manual  [] after manual  []w/heat    []  Ultrasound: []Continuous   [] Pulsed at:                            []1MHz   []3MHz Location:  W/cm2:    []  Paraffin         Location:  []w/heat   10 [x]  Ice     []  Heat  []  Ice massage Position: supine with LE's elevated on wedge  Location: L anterior hip and L knee    []  Laser  []  Other: Position:  Location:    []  Vasopneumatic Device Pressure:       [] lo [] med [] hi   Temperature:    [x] Skin assessment post-treatment:  [x]intact []redness- no adverse reaction    []redness  adverse reaction:     45 min Therapeutic Exercise:  [x] See flow sheet : added seated elliptical, SLR, sup hip abd w/ band, SLS clock,     Rationale: increase ROM, increase strength, improve balance and increase proprioception to improve the patients ability to Walk dog for 30min 2x / day without restrictions. Ascend and descend stairs without discomfort. 5 min Manual Therapy:  STM/TPR L glut med, piriformis and QL muscles in R side lying   Rationale: decrease pain, increase ROM, increase tissue extensibility and decrease trigger points  to improve the patients ability to Walk dog for 30min 2x / day without restrictions. Ascend and descend stairs without discomfort. With   [x] TE   [] TA   [] neuro   [] other: Patient Education: [x] Review HEP    [] Progressed/Changed HEP based on:   [] positioning   [] body mechanics   [] transfers   [x] heat/ice application    [] other:      Other Objective/Functional Measures: mod TTP L glut med, piriformis and QL     Pain Level (0-10 scale) post treatment: 0/10    ASSESSMENT/Changes in Function:    minor cues for quad activation with supine SLR hip flexion. Fatigue noted with s/l clamshells but able to perform without pain. Patient will continue to benefit from skilled PT services to modify and progress therapeutic interventions, address functional mobility deficits, address ROM deficits, address strength deficits, analyze and cue movement patterns and instruct in home and community integration to attain remaining goals.      []  See Plan of Care  []  See progress note/recertification  []  See Discharge Summary         Progress towards goals / Updated goals:  nt    PLAN  []  Upgrade activities as tolerated     [x]  Continue plan of care  []  Update interventions per flow sheet       []  Discharge due to:_  []  Other:_      Haydee Gutierrez, LEWIS 3/16/2017  11:05 AM

## 2017-03-20 ENCOUNTER — HOSPITAL ENCOUNTER (OUTPATIENT)
Dept: PHYSICAL THERAPY | Age: 67
Discharge: HOME | End: 2017-03-20
Payer: COMMERCIAL

## 2017-03-20 PROCEDURE — 97110 THERAPEUTIC EXERCISES: CPT | Performed by: PHYSICAL THERAPY ASSISTANT

## 2017-03-20 PROCEDURE — 97140 MANUAL THERAPY 1/> REGIONS: CPT | Performed by: PHYSICAL THERAPY ASSISTANT

## 2017-03-20 NOTE — PROGRESS NOTES
PT DAILY TREATMENT NOTE 2-15    Patient Name: Kermit Blair  Date:3/20/2017  : 1950  [x]  Patient  Verified  Payor: Danisha Menard / Plan: Edgard Garcia / Product Type: PPO /    In time: 12:35 AM  Out time: 1:45 PM  Total Treatment Time (min): 70  Visit #: 6  RTMD: 3/31/17    Treatment Area: Left hip pain [M25.552]    SUBJECTIVE  Pain Level (0-10 scale): 0/10  Any medication changes, allergies to medications, adverse drug reactions, diagnosis change, or new procedure performed?: [x] No    [] Yes (see summary sheet for update)  Subjective functional status/changes:   [] No changes reported  Patient reports she just came from a work meeting \"It's my first one since my surgery. \" She was able to sit for 1.5 hrs without hip pain. Overall doing well, visited with family over the weekend, no reports of pain/discomfort in L hip. She is wondering whether or not she can have a massage. States continued B knee pain but she is consistent with icing her knees and hip every night \"which really helps. \"    OBJECTIVE    Modality rationale: decrease edema, decrease inflammation and decrease pain to improve the patients ability to Walk dog for 30min 2x / day without restrictions. Ascend and descend stairs without discomfort.     Min Type Additional Details    [] Estim: []Att   []Unatt        []TENS instruct                  []IFC  []Premod   []NMES                     []Other:  []w/US   []w/ice   []w/heat  Position:  Location:    []  Traction: [] Cervical       []Lumbar                       [] Prone          []Supine                       []Intermittent   []Continuous Lbs:  [] before manual  [] after manual  []w/heat    []  Ultrasound: []Continuous   [] Pulsed at:                            []1MHz   []3MHz Location:  W/cm2:    []  Paraffin         Location:  []w/heat   10 [x]  Ice     []  Heat  []  Ice massage Position: supine with LE's elevated on wedge  Location: L anterior hip and L knee    [] Laser  []  Other: Position:  Location:    []  Vasopneumatic Device Pressure:       [] lo [] med [] hi   Temperature:    [x] Skin assessment post-treatment:  [x]intact []redness- no adverse reaction    []redness  adverse reaction:     50 min Therapeutic Exercise:  [x] See flow sheet : added rockerboard fw, increased time to 10 minutes on recumbent elliptical    Rationale: increase ROM, increase strength, improve balance and increase proprioception to improve the patients ability to Walk dog for 30min 2x / day without restrictions. Ascend and descend stairs without discomfort. 10 min Manual Therapy:  STM/TPR L glut med, piriformis and QL muscles in R side lying   Rationale: decrease pain, increase ROM, increase tissue extensibility and decrease trigger points  to improve the patients ability to Walk dog for 30min 2x / day without restrictions. Ascend and descend stairs without discomfort. With   [x] TE   [] TA   [] neuro   [] other: Patient Education: [x] Review HEP    [] Progressed/Changed HEP based on:   [] positioning   [] body mechanics   [] transfers   [x] heat/ice application    [] other:      Other Objective/Functional Measures: nt     Pain Level (0-10 scale) post treatment: 0/10    ASSESSMENT/Changes in Function:   Attempted foam with standing marches but noted increased lateral trunk flexion and decreased hip flexion so performed on level surface. Challenged with rockerboard fw and slight lateral knee discomfort noted with SAQ on R. Advised patient to avoid exercises that are painful. Patient will continue to benefit from skilled PT services to modify and progress therapeutic interventions, address functional mobility deficits, address ROM deficits, address strength deficits, analyze and cue movement patterns and instruct in home and community integration to attain remaining goals.      []  See Plan of Care  []  See progress note/recertification  []  See Discharge Summary         Progress towards goals / Updated goals:  nt    PLAN  []  Upgrade activities as tolerated     [x]  Continue plan of care  []  Update interventions per flow sheet       []  Discharge due to:_  []  Other:_      Zoe Damon PTA 3/20/2017  12:35 PM

## 2017-03-23 ENCOUNTER — HOSPITAL ENCOUNTER (OUTPATIENT)
Dept: PHYSICAL THERAPY | Age: 67
Discharge: HOME | End: 2017-03-23
Payer: COMMERCIAL

## 2017-03-23 PROCEDURE — 97140 MANUAL THERAPY 1/> REGIONS: CPT | Performed by: PHYSICAL THERAPY ASSISTANT

## 2017-03-23 PROCEDURE — 97110 THERAPEUTIC EXERCISES: CPT | Performed by: PHYSICAL THERAPY ASSISTANT

## 2017-03-23 NOTE — PROGRESS NOTES
PT DAILY TREATMENT NOTE 2-15    Patient Name: Shila Mitchell  Date:3/23/2017  : 1950  [x]  Patient  Verified  Payor: Gonzalo Casas / Plan: Adams Mart / Product Type: PPO /    In time: 2:00 PM  Out time: 3:15 PM  Total Treatment Time (min): 70  Visit #: 7  RTMD: 3/31/17    Treatment Area: Left hip pain [M25.552]    SUBJECTIVE  Pain Level (0-10 scale): 0/10  Any medication changes, allergies to medications, adverse drug reactions, diagnosis change, or new procedure performed?: [x] No    [] Yes (see summary sheet for update)  Subjective functional status/changes:   [] No changes reported  Patient reports increased L knee pain while on the recumbent elliptical. States also continues to have R knee pain \"It's just there, I had an injection last year which helped. Maybe I should get another one. \" Reports increased lateral thigh pain today. OBJECTIVE    Modality rationale: decrease edema, decrease inflammation and decrease pain to improve the patients ability to Walk dog for 30min 2x / day without restrictions. Ascend and descend stairs without discomfort.     Min Type Additional Details    [] Estim: []Att   []Unatt        []TENS instruct                  []IFC  []Premod   []NMES                     []Other:  []w/US   []w/ice   []w/heat  Position:  Location:    []  Traction: [] Cervical       []Lumbar                       [] Prone          []Supine                       []Intermittent   []Continuous Lbs:  [] before manual  [] after manual  []w/heat    []  Ultrasound: []Continuous   [] Pulsed at:                            []1MHz   []3MHz Location:  W/cm2:    []  Paraffin         Location:  []w/heat   10 [x]  Ice     []  Heat  []  Ice massage Position: supine with LE's elevated on wedge  Location: L anterior hip and L knee    []  Laser  []  Other: Position:  Location:    []  Vasopneumatic Device Pressure:       [] lo [] med [] hi   Temperature:    [x] Skin assessment post-treatment: [x]intact []redness- no adverse reaction    []redness  adverse reaction:     50 min Therapeutic Exercise:  [x] See flow sheet :   Rationale: increase ROM, increase strength, improve balance and increase proprioception to improve the patients ability to Walk dog for 30min 2x / day without restrictions. Ascend and descend stairs without discomfort. 10 min Manual Therapy:  STM/TPR L glut med, piriformis and IT band in R side lying   Rationale: decrease pain, increase ROM, increase tissue extensibility and decrease trigger points  to improve the patients ability to Walk dog for 30min 2x / day without restrictions. Ascend and descend stairs without discomfort. With   [x] TE   [] TA   [] neuro   [] other: Patient Education: [x] Review HEP    [] Progressed/Changed HEP based on:   [] positioning   [] body mechanics   [] transfers   [x] heat/ice application    [] other:      Other Objective/Functional Measures: Mod-Severe TTP L IT band at mid substance and distal insertion     Pain Level (0-10 scale) post treatment: 0/10    ASSESSMENT/Changes in Function:   Patient with increased R knee pain during modified tandem balance so discontinued. Patient's B knee pain limiting standing tolerance today. Increased TTP L IT band, improved after manual therapy. Encouraged patient to perform STM at home to reduce tightness/discomfort. Patient will continue to benefit from skilled PT services to modify and progress therapeutic interventions, address functional mobility deficits, address ROM deficits, address strength deficits, analyze and cue movement patterns and instruct in home and community integration to attain remaining goals.      []  See Plan of Care  []  See progress note/recertification  []  See Discharge Summary         Progress towards goals / Updated goals:  nt    PLAN  []  Upgrade activities as tolerated     [x]  Continue plan of care  []  Update interventions per flow sheet       []  Discharge due to:_  [] Other:_      Lamar Mauricio, PTA 3/23/2017  2:00 PM

## 2017-03-28 ENCOUNTER — HOSPITAL ENCOUNTER (OUTPATIENT)
Dept: PHYSICAL THERAPY | Age: 67
Discharge: HOME | End: 2017-03-28
Payer: COMMERCIAL

## 2017-03-28 PROCEDURE — 97110 THERAPEUTIC EXERCISES: CPT

## 2017-03-28 PROCEDURE — 97140 MANUAL THERAPY 1/> REGIONS: CPT

## 2017-03-28 NOTE — PROGRESS NOTES
PT DAILY TREATMENT NOTE 2-15    Patient Name: Maicol Johnson  Date:3/28/2017  : 1950  [x]  Patient  Verified  Payor: Chloe Zhao / Plan: Sher Waddell / Product Type: PPO /    In time: 430 PM  Out time: 530 PM  Total Treatment Time (min): 60  Visit #: 8  RTMD: 3/31/17    Treatment Area: Left hip pain [M25.552]    SUBJECTIVE  Pain Level (0-10 scale): 3-410  Any medication changes, allergies to medications, adverse drug reactions, diagnosis change, or new procedure performed?: [x] No    [] Yes (see summary sheet for update)  Subjective functional status/changes:   [] No changes reported  Patient presents emotional today about increased sx bilat knee and L hip; reports \"over did it\" last Friday w/ combination of long ride in car to meeting, attending  and reception, wearing small heels; no incr pain/swelling during however experienced signif incr pain and swelling Friday night and Saturday morning; spent the weekend w/ legs elevated and utilizing ice, pain has decr & swelling has decr however persists; pt states \"feel like I just really set myself back\"     Reports RTMD 3/31/2017 \"think going to talk to him about my knees\"     OBJECTIVE    Modality rationale: decrease edema, decrease inflammation and decrease pain to improve the patients ability to Walk dog for 30min 2x / day without restrictions. Ascend and descend stairs without discomfort.     Min Type Additional Details    [] Estim: []Att   []Unatt        []TENS instruct                  []IFC  []Premod   []NMES                     []Other:  []w/US   []w/ice   []w/heat  Position:  Location:    []  Traction: [] Cervical       []Lumbar                       [] Prone          []Supine                       []Intermittent   []Continuous Lbs:  [] before manual  [] after manual  []w/heat    []  Ultrasound: []Continuous   [] Pulsed at:                            []1MHz   []3MHz Location:  W/cm2:    []  Paraffin Location:  []w/heat   10 [x]  Ice     []  Heat  []  Ice massage Position: supine with LE's elevated on wedge  Location: L hip and bilat knee    []  Laser  []  Other: Position:  Location:    []  Vasopneumatic Device Pressure:       [] lo [] med [] hi   Temperature:    [x] Skin assessment post-treatment:  [x]intact []redness- no adverse reaction    []redness  adverse reaction:     20 min Therapeutic Exercise:  [x] See flow sheet : modified ex today due to incr pain levels    Rationale: increase ROM, increase strength, improve balance and increase proprioception to improve the patients ability to Walk dog for 30min 2x / day without restrictions. Ascend and descend stairs without discomfort. 25 min Manual Therapy:  STM/TPR L TFL, glut med, piriformis and IT band in R side lying; STM/effleurage bilat knee, infrapatellar fat pad mobilization, patellar mobilizationo    Rationale: decrease pain, increase ROM, increase tissue extensibility and decrease trigger points  to improve the patients ability to Walk dog for 30min 2x / day without restrictions. Ascend and descend stairs without discomfort.      With   [x] TE   [] TA   [] neuro   [] other: Patient Education: [x] Review HEP    [] Progressed/Changed HEP based on:   [] positioning   [] body mechanics   [] transfers   [x] heat/ice application    [] other:      Other Objective/Functional Measures:   Tender bilat knee infrapatellar reg, patellar tendon, med & lat patellar borders  Tender L TFL, glut med, piriformis mm.   signif edema bilat knee observed     Pain Level (0-10 scale) post treatment: 2-3/10    ASSESSMENT/Changes in Function:     Limited treatment session today due to exacerbation of hip and knee sx & signif incr sx; pt emotional about over exerting on Friday and causing exacerbation; time spent discussing normal response to increased stress on tissues and potential recovery; pt scheduled to see MD for follow up 3/31/2017, advised pt discuss bilat knee pain and potential treatment options to better manage knee pain     Patient will continue to benefit from skilled PT services to modify and progress therapeutic interventions, address functional mobility deficits, address ROM deficits, address strength deficits, analyze and cue movement patterns and instruct in home and community integration to attain remaining goals.      []  See Plan of Care  []  See progress note/recertification  []  See Discharge Summary         Progress towards goals / Updated goals:  nt    PLAN  []  Upgrade activities as tolerated     [x]  Continue plan of care  []  Update interventions per flow sheet       []  Discharge due to:_  [x]  Other: RTMD 3/31/2017, pt to discuss POC re: L hip & bilat knee, cont as buffy/directed     Thiago Taylor, PT 3/28/2017  4:54 PM

## 2017-03-30 ENCOUNTER — APPOINTMENT (OUTPATIENT)
Dept: PHYSICAL THERAPY | Age: 67
End: 2017-03-30
Payer: COMMERCIAL

## 2017-03-31 ENCOUNTER — APPOINTMENT (OUTPATIENT)
Dept: PHYSICAL THERAPY | Age: 67
End: 2017-03-31
Payer: COMMERCIAL

## 2017-04-04 ENCOUNTER — HOSPITAL ENCOUNTER (OUTPATIENT)
Dept: PHYSICAL THERAPY | Age: 67
Discharge: HOME | End: 2017-04-04
Payer: COMMERCIAL

## 2017-04-04 PROCEDURE — 97140 MANUAL THERAPY 1/> REGIONS: CPT | Performed by: PHYSICAL THERAPY ASSISTANT

## 2017-04-04 PROCEDURE — 97110 THERAPEUTIC EXERCISES: CPT | Performed by: PHYSICAL THERAPY ASSISTANT

## 2017-04-04 NOTE — PROGRESS NOTES
PT DAILY TREATMENT NOTE 2-15    Patient Name: Sybil Restrepo  Date:2017  : 1950  [x]  Patient  Verified  Payor: Adilene Nguyen / Plan: Ken Goode / Product Type: PPO /    In time: 2:00 PM  Out time: 3:00 PM  Total Treatment Time (min): 60  Visit #: 9      Treatment Area: Left hip pain [M25.552]    SUBJECTIVE  Pain Level (0-10 scale): 1/10  Any medication changes, allergies to medications, adverse drug reactions, diagnosis change, or new procedure performed?: [x] No    [] Yes (see summary sheet for update)  Subjective functional status/changes:   [] No changes reported  Patient presents today after recent cortisone injection on Friday, decreased B knee pain. States walking her dog over the weekend but otherwise \"just rested. \"    OBJECTIVE    Modality rationale: decrease edema, decrease inflammation and decrease pain to improve the patients ability to Walk dog for 30min 2x / day without restrictions. Ascend and descend stairs without discomfort.     Min Type Additional Details    [] Estim: []Att   []Unatt        []TENS instruct                  []IFC  []Premod   []NMES                     []Other:  []w/US   []w/ice   []w/heat  Position:  Location:    []  Traction: [] Cervical       []Lumbar                       [] Prone          []Supine                       []Intermittent   []Continuous Lbs:  [] before manual  [] after manual  []w/heat    []  Ultrasound: []Continuous   [] Pulsed at:                            []1MHz   []3MHz Location:  W/cm2:    []  Paraffin         Location:  []w/heat   10 [x]  Ice     []  Heat  []  Ice massage Position: supine with LE's elevated on wedge  Location: L hip and bilat knee    []  Laser  []  Other: Position:  Location:    []  Vasopneumatic Device Pressure:       [] lo [] med [] hi   Temperature:    [x] Skin assessment post-treatment:  [x]intact []redness- no adverse reaction    []redness  adverse reaction:     25 min Therapeutic Exercise:  [x] See flow sheet : modified ex today due to recent cortisone injection    Rationale: increase ROM, increase strength, improve balance and increase proprioception to improve the patients ability to Walk dog for 30min 2x / day without restrictions. Ascend and descend stairs without discomfort. 25 min Manual Therapy:  STM/TPR L TFL, glut med, piriformis and IT band in R side lying;     Rationale: decrease pain, increase ROM, increase tissue extensibility and decrease trigger points  to improve the patients ability to Walk dog for 30min 2x / day without restrictions. Ascend and descend stairs without discomfort. With   [x] TE   [] TA   [] neuro   [] other: Patient Education: [x] Review HEP    [] Progressed/Changed HEP based on:   [] positioning   [] body mechanics   [] transfers   [x] heat/ice application    [] other:      Other Objective/Functional Measures:   Tender L IT band at mid substance, glut med, piriformis mm. Pain Level (0-10 scale) post treatment: 0/10    ASSESSMENT/Changes in Function:     Patient without knee pain today but modified treatment session due to recent B cortisone injections. Advised patient to \"take it easy\" next couple of days to allow the injection to stay in the joint. TTP L IT band at midsubstance and piriformis/glut region today. Patient will continue to benefit from skilled PT services to modify and progress therapeutic interventions, address functional mobility deficits, address ROM deficits, address strength deficits, analyze and cue movement patterns and instruct in home and community integration to attain remaining goals.      []  See Plan of Care  []  See progress note/recertification  []  See Discharge Summary         Progress towards goals / Updated goals:  nt    PLAN  []  Upgrade activities as tolerated     [x]  Continue plan of care  []  Update interventions per flow sheet       []  Discharge due to:_  []  Other:     Rodney Jenkins, LEWIS 4/4/2017  2:00 PM

## 2017-04-07 ENCOUNTER — HOSPITAL ENCOUNTER (OUTPATIENT)
Dept: PHYSICAL THERAPY | Age: 67
Discharge: HOME | End: 2017-04-07
Payer: COMMERCIAL

## 2017-04-07 PROCEDURE — 97140 MANUAL THERAPY 1/> REGIONS: CPT

## 2017-04-07 PROCEDURE — 97110 THERAPEUTIC EXERCISES: CPT

## 2017-04-07 NOTE — PROGRESS NOTES
PT DAILY TREATMENT NOTE 2-15    Patient Name: Yusra Market  Date:2017  : 1950  [x]  Patient  Verified  Payor: Sivakumar Carbajal / Plan: Александр Pi / Product Type: PPO /    In time: 1135 AM  Out time: 7287 PM  Total Treatment Time (min): 70  Visit #: 10      Treatment Area: Left hip pain [M25.552]    SUBJECTIVE  Pain Level (0-10 scale): 1/10  Any medication changes, allergies to medications, adverse drug reactions, diagnosis change, or new procedure performed?: [x] No    [] Yes (see summary sheet for update)  Subjective functional status/changes:   [] No changes reported  Patient reports knee \"still there\", L hip pain decr comp last visit     OBJECTIVE    Modality rationale: decrease edema, decrease inflammation and decrease pain to improve the patients ability to Walk dog for 30min 2x / day without restrictions. Ascend and descend stairs without discomfort.     Min Type Additional Details    [] Estim: []Att   []Unatt        []TENS instruct                  []IFC  []Premod   []NMES                     []Other:  []w/US   []w/ice   []w/heat  Position:  Location:    []  Traction: [] Cervical       []Lumbar                       [] Prone          []Supine                       []Intermittent   []Continuous Lbs:  [] before manual  [] after manual  []w/heat    []  Ultrasound: []Continuous   [] Pulsed at:                            []1MHz   []3MHz Location:  W/cm2:    []  Paraffin         Location:  []w/heat   10 [x]  Ice     []  Heat  []  Ice massage Position: supine with LE's elevated on wedge  Location: L hip and bilat knee    []  Laser  []  Other: Position:  Location:    []  Vasopneumatic Device Pressure:       [] lo [] med [] hi   Temperature:    [x] Skin assessment post-treatment:  [x]intact []redness- no adverse reaction    []redness  adverse reaction:     40 min Therapeutic Exercise:  [x] See flow sheet :  Resume standing ex today as buffy, cont to hold from squat & SLS clock Rationale: increase ROM, increase strength, improve balance and increase proprioception to improve the patients ability to Walk dog for 30min 2x / day without restrictions. Ascend and descend stairs without discomfort. 15 min Manual Therapy:  STM/TPR L TFL, glut med, piriformis and IT band in R side lying;     Rationale: decrease pain, increase ROM, increase tissue extensibility and decrease trigger points  to improve the patients ability to Walk dog for 30min 2x / day without restrictions. Ascend and descend stairs without discomfort. With   [x] TE   [] TA   [] neuro   [] other: Patient Education: [x] Review HEP    [] Progressed/Changed HEP based on:   [] positioning   [] body mechanics   [] transfers   [x] heat/ice application    [] other:      Other Objective/Functional Measures:   Tender L IT band at mid substance, glut med, piriformis mm. Pain Level (0-10 scale) post treatment: 0/10    ASSESSMENT/Changes in Function:     Patient w/ cont tenderness L glut med & IT band however decr comp last visit; overall buffy standing ex well however reports rockerboard lat incr R knee discomfort - hold next visit   Patient will continue to benefit from skilled PT services to modify and progress therapeutic interventions, address functional mobility deficits, address ROM deficits, address strength deficits, analyze and cue movement patterns and instruct in home and community integration to attain remaining goals.      []  See Plan of Care  []  See progress note/recertification  []  See Discharge Summary         Progress towards goals / Updated goals:  nt    PLAN  []  Upgrade activities as tolerated     [x]  Continue plan of care  []  Update interventions per flow sheet       []  Discharge due to:_  []  Other:     Alysia Mendez PT 4/7/2017  11:58 PM

## 2017-04-11 ENCOUNTER — HOSPITAL ENCOUNTER (OUTPATIENT)
Dept: PHYSICAL THERAPY | Age: 67
Discharge: HOME | End: 2017-04-11
Payer: COMMERCIAL

## 2017-04-11 PROCEDURE — 97110 THERAPEUTIC EXERCISES: CPT | Performed by: PHYSICAL THERAPY ASSISTANT

## 2017-04-11 PROCEDURE — 97140 MANUAL THERAPY 1/> REGIONS: CPT | Performed by: PHYSICAL THERAPY ASSISTANT

## 2017-04-11 NOTE — PROGRESS NOTES
PT DAILY TREATMENT NOTE 2-15    Patient Name: Amrit Public  Date:2017  : 1950  [x]  Patient  Verified  Payor: Ernst Parks / Plan: Madeleine Francis / Product Type: PPO /    In time: 1105 AM  Out time: 7906 PM  Total Treatment Time (min): 70  Visit #: 11      Treatment Area: Left hip pain [M25.552]    SUBJECTIVE  Pain Level (0-10 scale): 1/10  Any medication changes, allergies to medications, adverse drug reactions, diagnosis change, or new procedure performed?: [x] No    [] Yes (see summary sheet for update)  Subjective functional status/changes:   [] No changes reported  Patient reports \"sore\" today in her knees. States she walked a bit over the weekend. OBJECTIVE    Modality rationale: decrease edema, decrease inflammation and decrease pain to improve the patients ability to Walk dog for 30min 2x / day without restrictions. Ascend and descend stairs without discomfort.     Min Type Additional Details    [] Estim: []Att   []Unatt        []TENS instruct                  []IFC  []Premod   []NMES                     []Other:  []w/US   []w/ice   []w/heat  Position:  Location:    []  Traction: [] Cervical       []Lumbar                       [] Prone          []Supine                       []Intermittent   []Continuous Lbs:  [] before manual  [] after manual  []w/heat    []  Ultrasound: []Continuous   [] Pulsed at:                            []1MHz   []3MHz Location:  W/cm2:    []  Paraffin         Location:  []w/heat   10 [x]  Ice     []  Heat  []  Ice massage Position: supine with LE's elevated on wedge  Location: L hip and bilat knee    []  Laser  []  Other: Position:  Location:    []  Vasopneumatic Device Pressure:       [] lo [] med [] hi   Temperature:    [x] Skin assessment post-treatment:  [x]intact []redness- no adverse reaction    []redness  adverse reaction:     40 min Therapeutic Exercise:  [x] See flow sheet :     Rationale: increase ROM, increase strength, improve balance and increase proprioception to improve the patients ability to Walk dog for 30min 2x / day without restrictions. Ascend and descend stairs without discomfort. 15 min Manual Therapy:  STM/TPR L TFL, glut med, piriformis and IT band in R side lying;     Rationale: decrease pain, increase ROM, increase tissue extensibility and decrease trigger points  to improve the patients ability to Walk dog for 30min 2x / day without restrictions. Ascend and descend stairs without discomfort. With   [x] TE   [] TA   [] neuro   [] other: Patient Education: [x] Review HEP    [] Progressed/Changed HEP based on:   [] positioning   [] body mechanics   [] transfers   [x] heat/ice application    [] other:      Other Objective/Functional Measures:   Tender L IT band at mid substance, glut med, piriformis mm. Pain Level (0-10 scale) post treatment: 1/10    ASSESSMENT/Changes in Function:   Patient able to perform SLS clock without aggravation of symptoms. Decreased TTP L piriformis and glut med muscles, greatest TTP L IT band at mid substance. Encouraged patient to try rolling pin/foam roll to L IT band to reduce pain/TTP. Patient will continue to benefit from skilled PT services to modify and progress therapeutic interventions, address functional mobility deficits, address ROM deficits, address strength deficits, analyze and cue movement patterns and instruct in home and community integration to attain remaining goals.      []  See Plan of Care  []  See progress note/recertification  []  See Discharge Summary         Progress towards goals / Updated goals:  nt    PLAN  []  Upgrade activities as tolerated     [x]  Continue plan of care  []  Update interventions per flow sheet       []  Discharge due to:_  []  Other:     Alesia García PTA 4/11/2017  11:05 AM

## 2017-04-13 ENCOUNTER — HOSPITAL ENCOUNTER (OUTPATIENT)
Dept: PHYSICAL THERAPY | Age: 67
Discharge: HOME | End: 2017-04-13
Payer: COMMERCIAL

## 2017-04-13 PROCEDURE — 97110 THERAPEUTIC EXERCISES: CPT | Performed by: PHYSICAL THERAPY ASSISTANT

## 2017-04-13 PROCEDURE — 97140 MANUAL THERAPY 1/> REGIONS: CPT | Performed by: PHYSICAL THERAPY ASSISTANT

## 2017-04-13 NOTE — PROGRESS NOTES
PT DAILY TREATMENT NOTE 2-15    Patient Name: Tatianna Rubin  Date:2017  : 1950  [x]  Patient  Verified  Payor: Juan Francisco Lyman / Plan: Kimberley Hayward / Product Type: PPO /    In time: 2:40 PM  Out time: 3:55 PM  Total Treatment Time (min): 75  Visit #: 12      Treatment Area: Left hip pain [M25.552]    SUBJECTIVE  Pain Level (0-10 scale): 1/10  Any medication changes, allergies to medications, adverse drug reactions, diagnosis change, or new procedure performed?: [x] No    [] Yes (see summary sheet for update)  Subjective functional status/changes:   [] No changes reported  Patient reports \"sore\" today in her left knee. States that she has been taking some Ibuprofen, which might be masking some pain. Reports that she has been walking the dog for 15-20 minutes a day and has felt fine afterwards. States that her worst pain comes in the evenings after being on her feet all day. Reports that she iced this morning. Patient states that although she still has some pain, she thinks PT is really working because the pain isn't as limiting as it used to be. OBJECTIVE    Modality rationale: decrease edema, decrease inflammation and decrease pain to improve the patients ability to Walk dog for 30min 2x / day without restrictions. Ascend and descend stairs without discomfort.     Min Type Additional Details    [] Estim: []Att   []Unatt        []TENS instruct                  []IFC  []Premod   []NMES                     []Other:  []w/US   []w/ice   []w/heat  Position:  Location:    []  Traction: [] Cervical       []Lumbar                       [] Prone          []Supine                       []Intermittent   []Continuous Lbs:  [] before manual  [] after manual  []w/heat    []  Ultrasound: []Continuous   [] Pulsed at:                            []1MHz   []3MHz Location:  W/cm2:    []  Paraffin         Location:  []w/heat   10 [x]  Ice     []  Heat  []  Ice massage Position: sidelying with pillow between knees  Location: L lateral hip and ITB    []  Laser  []  Other: Position:  Location:    []  Vasopneumatic Device Pressure:       [] lo [] med [] hi   Temperature:    [x] Skin assessment post-treatment:  [x]intact []redness- no adverse reaction    []redness  adverse reaction:     45 min Therapeutic Exercise:  [x] See flow sheet :  Added lateral stepping with yellow band AK; progressed clocks without toe taps   Rationale: increase ROM, increase strength, improve balance and increase proprioception to improve the patients ability to Walk dog for 30min 2x / day without restrictions. Ascend and descend stairs without discomfort. 20 min Manual Therapy:  STM/TPR L TFL, glut med, piriformis and IT band in R side lying;     Rationale: decrease pain, increase ROM, increase tissue extensibility and decrease trigger points  to improve the patients ability to Walk dog for 30min 2x / day without restrictions. Ascend and descend stairs without discomfort. With   [x] TE   [] TA   [] neuro   [] other: Patient Education: [x] Review HEP    [] Progressed/Changed HEP based on:   [] positioning   [] body mechanics   [] transfers   [x] heat/ice application    [] other:      Other Objective/Functional Measures:   TTP mid substance ITB L hip        Pain Level (0-10 scale) post treatment: 1/10    ASSESSMENT/Changes in Function:   Patient able to perform SLS clocks without toe taps but they were challenging for her. Decreased TTP L piriformis and glut med muscles, greatest TTP L IT band at mid substance. Patient tolerated lateral stepping without aggravation of sx. Patient will continue to benefit from skilled PT services to modify and progress therapeutic interventions, address functional mobility deficits, address ROM deficits, address strength deficits, analyze and cue movement patterns and instruct in home and community integration to attain remaining goals.      []  See Plan of Care  []  See progress note/recertification  []  See Discharge Summary         Progress towards goals / Updated goals:  nt    PLAN  []  Upgrade activities as tolerated     [x]  Continue plan of care  []  Update interventions per flow sheet       []  Discharge due to:_  []  Other:     Isael Low, PTA 4/13/2017  2:40 PM

## 2017-04-17 ENCOUNTER — HOSPITAL ENCOUNTER (OUTPATIENT)
Dept: PHYSICAL THERAPY | Age: 67
Discharge: HOME | End: 2017-04-17
Payer: COMMERCIAL

## 2017-04-17 PROCEDURE — 97110 THERAPEUTIC EXERCISES: CPT | Performed by: PHYSICAL THERAPY ASSISTANT

## 2017-04-17 PROCEDURE — 97140 MANUAL THERAPY 1/> REGIONS: CPT | Performed by: PHYSICAL THERAPY ASSISTANT

## 2017-04-17 NOTE — PROGRESS NOTES
PT DAILY TREATMENT NOTE 2-15    Patient Name: Lexx Araya  Date:2017  : 1950  [x]  Patient  Verified  Payor: Nate Mcdonald / Plan: Juan M Chamberlain / Product Type: PPO /    In time: 11:35 AM  Out time: 12:50 PM  Total Treatment Time (min): 75  Visit #: 13      Treatment Area: Left hip pain [M25.552]    SUBJECTIVE  Pain Level (0-10 scale): 1/10  Any medication changes, allergies to medications, adverse drug reactions, diagnosis change, or new procedure performed?: [x] No    [] Yes (see summary sheet for update)  Subjective functional status/changes:   [] No changes reported  Patient reports she feels pretty good today, \"a little tender in the L hip. \" States she walked the dog this morning for a little longer than usual, 30 min. Patient claims she was very tired after her morning walk but was very happy that she was able to do it. Reports that she didn't have much increase in hip pain over the weekend. OBJECTIVE    Modality rationale: decrease edema, decrease inflammation and decrease pain to improve the patients ability to Walk dog for 30min 2x / day without restrictions. Ascend and descend stairs without discomfort.     Min Type Additional Details    [] Estim: []Att   []Unatt        []TENS instruct                  []IFC  []Premod   []NMES                     []Other:  []w/US   []w/ice   []w/heat  Position:  Location:    []  Traction: [] Cervical       []Lumbar                       [] Prone          []Supine                       []Intermittent   []Continuous Lbs:  [] before manual  [] after manual  []w/heat    []  Ultrasound: []Continuous   [] Pulsed at:                            []1MHz   []3MHz Location:  W/cm2:    []  Paraffin         Location:  []w/heat   10 [x]  Ice     []  Heat  []  Ice massage Position: supine with wedge  Location: L lateral hip and tuyet knees    []  Laser  []  Other: Position:  Location:    []  Vasopneumatic Device Pressure:       [] lo [] med [] hi Temperature:    [x] Skin assessment post-treatment:  [x]intact []redness- no adverse reaction    []redness  adverse reaction:     45 min Therapeutic Exercise:  [x] See flow sheet :  Added 1# weights to standing hip ABD, modified tandem stance on foam, added TG LP at 30 degrees   Rationale: increase ROM, increase strength, improve balance and increase proprioception to improve the patients ability to Walk dog for 30min 2x / day without restrictions. Ascend and descend stairs without discomfort. 20 min Manual Therapy:  STM/TPR L TFL, glut med, glut max, piriformis and IT band in R side lying;     Rationale: decrease pain, increase ROM, increase tissue extensibility and decrease trigger points  to improve the patients ability to Walk dog for 30min 2x / day without restrictions. Ascend and descend stairs without discomfort. With   [x] TE   [] TA   [] neuro   [] other: Patient Education: [x] Review HEP    [] Progressed/Changed HEP based on:   [] positioning   [] body mechanics   [] transfers   [x] heat/ice application    [] other:      Other Objective/Functional Measures:   TTP glut max L hip and mid substance L IT band. Decreased TTP L piriformis     Pain Level (0-10 scale) post treatment: 0-1/10    ASSESSMENT/Changes in Function:   Patient able to perform modified tandem stance on foam but it was challenging for her especially left leg posterior. Decreased TTP L piriformis. Patient tolerated total gym leg press without aggravation of knee sx. Patient will continue to benefit from skilled PT services to modify and progress therapeutic interventions, address functional mobility deficits, address ROM deficits, address strength deficits, analyze and cue movement patterns and instruct in home and community integration to attain remaining goals.      []  See Plan of Care  []  See progress note/recertification  []  See Discharge Summary         Progress towards goals / Updated goals:  nt    PLAN  []  Upgrade activities as tolerated     [x]  Continue plan of care  []  Update interventions per flow sheet       []  Discharge due to:_  [x]  Other: Progress balance/strength exercises next visit, agility ladder, step over drills    Berto Funez, LEWIS 4/17/2017  11:35 AM

## 2017-04-19 ENCOUNTER — HOSPITAL ENCOUNTER (OUTPATIENT)
Dept: PHYSICAL THERAPY | Age: 67
Discharge: HOME | End: 2017-04-19
Payer: COMMERCIAL

## 2017-04-19 PROCEDURE — 97140 MANUAL THERAPY 1/> REGIONS: CPT | Performed by: PHYSICAL THERAPY ASSISTANT

## 2017-04-19 PROCEDURE — 97110 THERAPEUTIC EXERCISES: CPT | Performed by: PHYSICAL THERAPY ASSISTANT

## 2017-04-19 NOTE — PROGRESS NOTES
PT DAILY TREATMENT NOTE 2-15    Patient Name: Delores Weiss  Date:2017  : 1950  [x]  Patient  Verified  Payor: Mark Anthony Ham / Plan: Vesna Leon / Product Type: PPO /    In time: 3:00 PM  Out time: 4:15 PM  Total Treatment Time (min): 75  Visit #: 14      Treatment Area: Left hip pain [M25.552]    SUBJECTIVE  Pain Level (0-10 scale): 1-2/10  Any medication changes, allergies to medications, adverse drug reactions, diagnosis change, or new procedure performed?: [x] No    [] Yes (see summary sheet for update)  Subjective functional status/changes:   [] No changes reported  Patient reports she is pretty sore today. States she was on her feet all day for the [employer] and \"she sure feels it. \" Reports she has a little pain but is more tired. OBJECTIVE    Modality rationale: decrease edema, decrease inflammation and decrease pain to improve the patients ability to Walk dog for 30min 2x / day without restrictions. Ascend and descend stairs without discomfort.     Min Type Additional Details    [] Estim: []Att   []Unatt        []TENS instruct                  []IFC  []Premod   []NMES                     []Other:  []w/US   []w/ice   []w/heat  Position:  Location:    []  Traction: [] Cervical       []Lumbar                       [] Prone          []Supine                       []Intermittent   []Continuous Lbs:  [] before manual  [] after manual  []w/heat    []  Ultrasound: []Continuous   [] Pulsed at:                            []1MHz   []3MHz Location:  W/cm2:    []  Paraffin         Location:  []w/heat   10 [x]  Ice     []  Heat  []  Ice massage Position: supine with wedge  Location: L lateral hip and tuyet knees    []  Laser  []  Other: Position:  Location:    []  Vasopneumatic Device Pressure:       [] lo [] med [] hi   Temperature:    [x] Skin assessment post-treatment:  [x]intact []redness- no adverse reaction    []redness  adverse reaction:     45 min Therapeutic Exercise: [x] See flow sheet : Added standing marches on foam, TG LP at 34 degrees today    Rationale: increase ROM, increase strength, improve balance and increase proprioception to improve the patients ability to Walk dog for 30min 2x / day without restrictions. Ascend and descend stairs without discomfort. 20 min Manual Therapy:  STM/TPR L TFL, glut med, glut max, piriformis and IT band in R side lying;     Rationale: decrease pain, increase ROM, increase tissue extensibility and decrease trigger points  to improve the patients ability to Walk dog for 30min 2x / day without restrictions. Ascend and descend stairs without discomfort. With   [x] TE   [] TA   [] neuro   [] other: Patient Education: [x] Review HEP    [] Progressed/Changed HEP based on:   [] positioning   [] body mechanics   [] transfers: Pt education on car transfers: sitting first and then bringing LEs into car to avoid pivot-twist on knee joint. [x] heat/ice application    [] other:      Other Objective/Functional Measures:   Decreased TTP L piriformis, glut med, L IT band  Increased TTP L GT bursa     Pain Level (0-10 scale) post treatment: 0/10    ASSESSMENT/Changes in Function:   Patient challenged with standing marches on foam, increased use of hands on parallel bars for balance. Decreased TTP L piriformis, glut med, max, IT band. Patient tolerated total gym leg press with increased incline. Patient will continue to benefit from skilled PT services to modify and progress therapeutic interventions, address functional mobility deficits, address ROM deficits, address strength deficits, analyze and cue movement patterns and instruct in home and community integration to attain remaining goals.      []  See Plan of Care  []  See progress note/recertification  []  See Discharge Summary         Progress towards goals / Updated goals:  nt    PLAN  []  Upgrade activities as tolerated     [x]  Continue plan of care  []  Update interventions per flow sheet       []  Discharge due to:_  [x]  Other: Progress balance/strength exercises next visit, agility ladder, step over drills    Sherri Wilson, PTA 4/19/2017  3:00 PM

## 2017-04-25 ENCOUNTER — APPOINTMENT (OUTPATIENT)
Dept: PHYSICAL THERAPY | Age: 67
End: 2017-04-25
Payer: COMMERCIAL

## 2017-04-26 ENCOUNTER — HOSPITAL ENCOUNTER (OUTPATIENT)
Dept: PHYSICAL THERAPY | Age: 67
Discharge: HOME | End: 2017-04-26
Payer: COMMERCIAL

## 2017-04-26 PROCEDURE — 97140 MANUAL THERAPY 1/> REGIONS: CPT | Performed by: PHYSICAL THERAPY ASSISTANT

## 2017-04-26 PROCEDURE — 97110 THERAPEUTIC EXERCISES: CPT | Performed by: PHYSICAL THERAPY ASSISTANT

## 2017-04-26 NOTE — PROGRESS NOTES
PT DAILY TREATMENT NOTE 2-15    Patient Name: Elvin Naranjo  Date:2017  : 1950  [x]  Patient  Verified  Payor: payleven Cables / Plan: Artie Garcia / Product Type: PPO /    In time: 10:10 AM  Out time: 11:15 AM  Total Treatment Time (min): 65  Visit #: 15      Treatment Area: Left hip pain [M25.552]    SUBJECTIVE  Pain Level (0-10 scale): 1-2/10  Any medication changes, allergies to medications, adverse drug reactions, diagnosis change, or new procedure performed?: [x] No    [] Yes (see summary sheet for update)  Subjective functional status/changes:   [] No changes reported    Patient reports that her hip is feeling a lot better. \"I was up on my feet a lot the past couple days and my hip felt great. \" Patient states the past couple evenings she has noticed she has been up and moving more than usual, less achy and tired. \"Any pain I feel is coming from my R knee and low back. \"    OBJECTIVE    Modality rationale: decrease edema, decrease inflammation and decrease pain to improve the patients ability to Walk dog for 30min 2x / day without restrictions. Ascend and descend stairs without discomfort.     Min Type Additional Details    [] Estim: []Att   []Unatt        []TENS instruct                  []IFC  []Premod   []NMES                     []Other:  []w/US   []w/ice   []w/heat  Position:  Location:    []  Traction: [] Cervical       []Lumbar                       [] Prone          []Supine                       []Intermittent   []Continuous Lbs:  [] before manual  [] after manual  []w/heat    []  Ultrasound: []Continuous   [] Pulsed at:                            []1MHz   []3MHz Location:  W/cm2:    []  Paraffin         Location:  []w/heat   10 [x]  Ice     []  Heat  []  Ice massage Position: supine with wedge  Location: L lateral hip and tuyet knees    []  Laser  []  Other: Position:  Location:    []  Vasopneumatic Device Pressure:       [] lo [] med [] hi   Temperature:    [x] Skin assessment post-treatment:  [x]intact []redness- no adverse reaction    []redness  adverse reaction:     40 min Therapeutic Exercise:  [x] See flow sheet     Rationale: increase ROM, increase strength, improve balance and increase proprioception to improve the patients ability to Walk dog for 30min 2x / day without restrictions. Ascend and descend stairs without discomfort. 15 min Manual Therapy:  STM/TPR L TFL, glut med, glut max, piriformis and IT band in R side lying;     Rationale: decrease pain, increase ROM, increase tissue extensibility and decrease trigger points  to improve the patients ability to Walk dog for 30min 2x / day without restrictions. Ascend and descend stairs without discomfort. With   [x] TE   [] TA   [] neuro   [] other: Patient Education: [x] Review HEP    [] Progressed/Changed HEP based on:   [] positioning   [] body mechanics   [] transfers:    [x] heat/ice application   [] other:      Other Objective/Functional Measures:   Decreased TTP L piriformis, glut med, L IT band  Increased TTP L glut max near sacral origin    Pain Level (0-10 scale) post treatment: 1/10    ASSESSMENT/Changes in Function:   Patient showed improved balance during marches and tandem on foam, decreased use of parallel bars for support. Decreased TTP L piriformis, glut med, IT band. Increased tenderness L glut max. Pt fatigued at end of step ups. Overall tolerated treatment well with no c/o hip pain. Patient will continue to benefit from skilled PT services to modify and progress therapeutic interventions, address functional mobility deficits, address ROM deficits, address strength deficits, analyze and cue movement patterns and instruct in home and community integration to attain remaining goals.      []  See Plan of Care  []  See progress note/recertification  []  See Discharge Summary         Progress towards goals / Updated goals:  nt    PLAN  []  Upgrade activities as tolerated     [x]  Continue plan of care  []  Update interventions per flow sheet       []  Discharge due to:_  [x]  Other: Progress balance/strength exercises next visit, agility ladder, step over drills    Karey Ray, LEWIS 4/26/2017  10:00 AM

## 2017-04-28 ENCOUNTER — HOSPITAL ENCOUNTER (OUTPATIENT)
Dept: PHYSICAL THERAPY | Age: 67
Discharge: HOME | End: 2017-04-28
Payer: COMMERCIAL

## 2017-04-28 PROCEDURE — 97140 MANUAL THERAPY 1/> REGIONS: CPT | Performed by: PHYSICAL THERAPIST

## 2017-04-28 PROCEDURE — 97110 THERAPEUTIC EXERCISES: CPT | Performed by: PHYSICAL THERAPIST

## 2017-04-28 NOTE — PROGRESS NOTES
PT DAILY TREATMENT NOTE 2-15    Patient Name: Maicol Johnson  Date:2017  : 1950  [x]  Patient  Verified  Payor: Chloe Zhao / Plan: Sher Waddell / Product Type: PPO /    In time: 11:35 AM  Out time: 12:55 PM  Total Treatment Time (min): 80  Visit #: 16      Treatment Area: Left hip pain [M25.552]    SUBJECTIVE  Pain Level (0-10 scale): 0/10  Any medication changes, allergies to medications, adverse drug reactions, diagnosis change, or new procedure performed?: [x] No    [] Yes (see summary sheet for update)  Subjective functional status/changes:   [] No changes reported  Pt states she is not going to be able to do PT for a couple weeks because she is going out of town. Reports 0/10 pain at start of treatment. Pt reports she drove 1.5 hours each way to [place] yesterday. \"It was my first time driving and I felt fine! \" States she has been walking the dog 2x/day every day with no pain or discomfort. OBJECTIVE    Modality rationale: decrease edema, decrease inflammation and decrease pain to improve the patients ability to Walk dog for 30min 2x / day without restrictions. Ascend and descend stairs without discomfort.     Min Type Additional Details    [] Estim: []Att   []Unatt        []TENS instruct                  []IFC  []Premod   []NMES                     []Other:  []w/US   []w/ice   []w/heat  Position:  Location:    []  Traction: [] Cervical       []Lumbar                       [] Prone          []Supine                       []Intermittent   []Continuous Lbs:  [] before manual  [] after manual  []w/heat    []  Ultrasound: []Continuous   [] Pulsed at:                            []1MHz   []3MHz Location:  W/cm2:    []  Paraffin         Location:  []w/heat   10 [x]  Ice     []  Heat  []  Ice massage Position: supine with wedge  Location: L lateral hip and tuyet knees    []  Laser  []  Other: Position:  Location:    []  Vasopneumatic Device Pressure:       [] lo [] med [] hi Temperature:    [x] Skin assessment post-treatment:  [x]intact []redness- no adverse reaction    []redness  adverse reaction:     55 min Therapeutic Exercise:  [x] See flow sheet  Added step ups with 6 in step    Rationale: increase ROM, increase strength, improve balance and increase proprioception to improve the patients ability to Walk dog for 30min 2x / day without restrictions. Ascend and descend stairs without discomfort. 15 min Manual Therapy:  STM/TPR L TFL, glut med, glut max, piriformis and IT band in R side lying;     Rationale: decrease pain, increase ROM, increase tissue extensibility and decrease trigger points  to improve the patients ability to Walk dog for 30min 2x / day without restrictions. Ascend and descend stairs without discomfort. With   [x] TE   [] TA   [] neuro   [] other: Patient Education: [x] Review HEP    [] Progressed/Changed HEP based on:   [] positioning   [] body mechanics   [] transfers:    [x] heat/ice application   [] other:      Other Objective/Functional Measures:   Decreased TTP L piriformis, glut med, L IT band, L glut max      Pain Level (0-10 scale) post treatment: 0/10    ASSESSMENT/Changes in Function: Decreased TTP L glut max. Increased L hip/knee control with leg press and tandem stance. Patient fatigued at end of SLS clocks. No c/o pain during exercises. Patient will continue to benefit from skilled PT services to modify and progress therapeutic interventions, address functional mobility deficits, address ROM deficits, address strength deficits, analyze and cue movement patterns and instruct in home and community integration to attain remaining goals.      []  See Plan of Care  []  See progress note/recertification  []  See Discharge Summary         Progress towards goals / Updated goals:  nt    PLAN  []  Upgrade activities as tolerated     [x]  Continue plan of care  []  Update interventions per flow sheet       []  Discharge due to:_  [x]  Other: Progress balance/strength exercises next visit, agility ladder, step over drills    Xena Anderson, PT 4/28/2017  11:57 AM

## 2017-05-01 ENCOUNTER — APPOINTMENT (OUTPATIENT)
Dept: PHYSICAL THERAPY | Age: 67
End: 2017-05-01
Payer: COMMERCIAL

## 2017-05-12 ENCOUNTER — APPOINTMENT (OUTPATIENT)
Dept: PHYSICAL THERAPY | Age: 67
End: 2017-05-12
Payer: COMMERCIAL

## 2017-05-15 ENCOUNTER — HOSPITAL ENCOUNTER (OUTPATIENT)
Dept: PHYSICAL THERAPY | Age: 67
Discharge: HOME | End: 2017-05-15
Payer: COMMERCIAL

## 2017-05-15 PROCEDURE — 97110 THERAPEUTIC EXERCISES: CPT | Performed by: PHYSICAL THERAPY ASSISTANT

## 2017-05-15 PROCEDURE — 97140 MANUAL THERAPY 1/> REGIONS: CPT | Performed by: PHYSICAL THERAPY ASSISTANT

## 2017-05-15 NOTE — PROGRESS NOTES
PT PROGRESS NOTE 2-15    Patient Name: Jodi Hess  Date:5/15/2017  : 1950  [x]  Patient  Verified  Payor: Evalene Severin / Plan: Rhonda Leon / Product Type: PPO /    In time: 4:00 PM  Out time: 5:10 PM  Total Treatment Time (min): 70 min  Visit #: 17      Treatment Area: Left hip pain [M25.552]    SUBJECTIVE  Pain Level (0-10 scale): 0/10  Any medication changes, allergies to medications, adverse drug reactions, diagnosis change, or new procedure performed?: [x] No    [] Yes (see summary sheet for update)  Subjective functional status/changes:   [] No changes reported    Patient reports she is feeling very good - pt hasn't been in for 2 weeks due to vacation. Reports she did a lot of walking around in [facility] and had to take frequent breaks but didn't have pain except for her knees, which have been limiting her more than her L hip. Patient states she still has some trouble going up and down stairs due to R knee pain and L hip weakness and has not been using a reciprocal gait pattern. Pain at worst 7/10, now 0/10. OBJECTIVE    Modality rationale: decrease edema, decrease inflammation and decrease pain to improve the patients ability to Walk dog for 30min 2x / day without restrictions. Ascend and descend stairs without discomfort.     Min Type Additional Details    [] Estim: []Att   []Unatt        []TENS instruct                  []IFC  []Premod   []NMES                     []Other:  []w/US   []w/ice   []w/heat  Position:  Location:    []  Traction: [] Cervical       []Lumbar                       [] Prone          []Supine                       []Intermittent   []Continuous Lbs:  [] before manual  [] after manual  []w/heat    []  Ultrasound: []Continuous   [] Pulsed at:                            []1MHz   []3MHz Location:  W/cm2:    []  Paraffin         Location:  []w/heat   10 [x]  Ice     []  Heat  []  Ice massage Position: supine with wedge  Location: L lateral hip and tuyet knees    []  Laser  []  Other: Position:  Location:    []  Vasopneumatic Device Pressure:       [] lo [] med [] hi   Temperature:    [x] Skin assessment post-treatment:  [x]intact []redness- no adverse reaction    []redness  adverse reaction:     50 min Therapeutic Exercise:  [x] See flow sheet  Reassessment performed, HR on foam   Rationale: increase ROM, increase strength, improve balance and increase proprioception to improve the patients ability to Walk dog for 30min 2x / day without restrictions. Ascend and descend stairs without discomfort. 10 min Manual Therapy:  STM/TPR L TFL, glut med, glut max, piriformis and IT band in R side lying;     Rationale: decrease pain, increase ROM, increase tissue extensibility and decrease trigger points  to improve the patients ability to Walk dog for 30min 2x / day without restrictions. Ascend and descend stairs without discomfort.      With   [x] TE   [] TA   [] neuro   [] other: Patient Education: [x] Review HEP    [x] Progressed/Changed HEP based on:  Gave patient handout on updated HEP  [] positioning   [] body mechanics   [] transfers:    [x] heat/ice application   [] other:      Other Objective/Functional Measures:   Posture: Seated posture WNL.      Gait: Even step length, heel toe pattern noted, appropriate arm swing,   Stairs: Pt performed combination of reciprocal and step-to pattern, intermittent use of UE for support; R knee pain was the limiting factor         ROM/Strength    AAROM    Strength (1-5)  Hip Right Left Right Left   Flexion nt 103 5 4-   Extension nt nt nt nt   Abduction nt nt nt 5   Adduction nt nt nt nt   ER nt nt 5 4+   IR nt nt nt nt   Knee Right Left Right Left   Extension WNL WNL 5 5   Flexion WNL WNL 5 5      Comments/Other:  Abdominal brace good activation, good control while breathing   Bridge with no reports of pain , no hip drop noted  Bridge w/ march - pt able to tolerate 1 rep B, mild hip drop noted      Functional Biomechanical Screen  SLS: L 21 sec R >10 sec. Bilateral Squat: partial squat; knees over toes, even weightbearing noted, R>L knee pain       Palpation  [x] Min [] Mod [] Severe Location: TTP left piriformis, glute med and IT band    FOTO Outcome Measure: Patients Intake FS Score was 57 initially placing the patient in Stage 4. Patients FS  score now is 60 out of 100 (3 points of functional change since intake), placing the  patient in Stage 5 and means patient is an active community ambulator.        Pain Level (0-10 scale) post treatment: 0/10    ASSESSMENT/Changes in Function:     Patient has been seen for 17 skilled physical therapy sessions and noting increase in strength and ROM as well as decreased pain levels. Patient with improved balance during SLS exercises and increased control of L hip and knee ascending stairs. Patient with poor eccentric control of L hip/knee descending stairs. FOTO score increase of 3 points indicating increase in functional activities. Patient has met 5/5 short-term goals and 4/6 long-term goals with progression towards a reciprocal gait on stairs. Advised patient to schedule 1x/week for 2-3 weeks. Patient will continue to benefit from skilled PT services to modify and progress therapeutic interventions, address functional mobility deficits, address ROM deficits, address strength deficits, analyze and cue movement patterns and instruct in home and community integration to attain remaining goals. []  See Plan of Care  []  See progress note/recertification  []  See Discharge Summary         Progress towards goals / Updated goals:  Short Term Goals:   1. Pt will be independent with HEP to allow continued progression of functional activities. MET  2. Pt will report >/= 25% decrease in symptoms to increase quality of life. MET  3. Stand greater than 15 minutes without increase of pain so that can complete light household chores such as cleaning dishes. MET  4.  Ambulate greater than 2 blocks without increase of pain to allow continued progression of walking program. MET  5. Pt will demonstrate proper abdominal brace sequencing and ability to hold >/= 10 seconds for improved core strength. MET     Long Term Goals:   1. Pt will be able to stand greater than 60 minutes without increase pain so that can to allow household cleaning without restrictions. MET  2. Pt will be able to ambulate greater than 6 blocks without increase of pain to allow return to previous walking routine and increase general wellness. MET  3. Pt will demonstrate independence with modified exercise/gym routine without aggravation of symptoms. MET  4. Pt will report able to sleep through night without pain to increase quality of life. MET  5. Pt will be able to perform 10 consecutive bridge w/ march w/out hip drop to demonstrate improved core strength in stance. Progressing Towards  6. Pt will demonstrate step over step pattern while ascending and descending 13 steps to reduce restrictions while at home.  Progressing Towards    PLAN  []  Upgrade activities as tolerated     [x]  Continue plan of care  []  Update interventions per flow sheet       []  Discharge due to:_  [x]  Other: Pt to continue PT 1x/week for 2-3 weeks  Hilda Pires PTA 5/15/2017  4:00 PM

## 2017-05-19 ENCOUNTER — HOSPITAL ENCOUNTER (OUTPATIENT)
Dept: PHYSICAL THERAPY | Age: 67
Discharge: HOME | End: 2017-05-19
Payer: COMMERCIAL

## 2017-05-19 PROCEDURE — 97110 THERAPEUTIC EXERCISES: CPT | Performed by: PHYSICAL THERAPY ASSISTANT

## 2017-05-19 NOTE — PROGRESS NOTES
PT DAILY TREATMENT NOTE 2-15    Patient Name: Mikala Swazi  Date:2017  : 1950  [x]  Patient  Verified  Payor: Ghanshyam Nair / Plan: Padmini Ellis / Product Type: PPO /    In time: 12:45 PM  Out time: 1:35 PM  Total Treatment Time (min): 50 min (40 timed)  Visit #: 18      Treatment Area: Left hip pain [M25.552]    SUBJECTIVE  Pain Level (0-10 scale): 1/10  Any medication changes, allergies to medications, adverse drug reactions, diagnosis change, or new procedure performed?: [x] No    [] Yes (see summary sheet for update)  Subjective functional status/changes:   [] No changes reported    Patient reports she has some pain today. \"I am a little sore and I'm not really sure why. \" Patient states she had a massage Wednesday night and she didn't tell the massage therapist to avoid her hip area so she thinks it may have been overworked. \"Pain is mild, not bad at all, just there. \" Patient reports she notices the most difficulty going up/down stairs leading with the L LE.     OBJECTIVE    Modality rationale: decrease edema, decrease inflammation and decrease pain to improve the patients ability to Walk dog for 30min 2x / day without restrictions. Ascend and descend stairs without discomfort.     Min Type Additional Details    [] Estim: []Att   []Unatt        []TENS instruct                  []IFC  []Premod   []NMES                     []Other:  []w/US   []w/ice   []w/heat  Position:  Location:    []  Traction: [] Cervical       []Lumbar                       [] Prone          []Supine                       []Intermittent   []Continuous Lbs:  [] before manual  [] after manual  []w/heat    []  Ultrasound: []Continuous   [] Pulsed at:                            []1MHz   []3MHz Location:  W/cm2:    []  Paraffin         Location:  []w/heat   10 [x]  Ice     []  Heat  []  Ice massage Position: supine with wedge  Location: L lateral hip and tuyet knees    []  Laser  []  Other: Position:  Location: []  Vasopneumatic Device Pressure:       [] lo [] med [] hi   Temperature:    [x] Skin assessment post-treatment:  [x]intact []redness- no adverse reaction    []redness  adverse reaction:     40 min Therapeutic Exercise:  [x] See flow sheet: SAQ --> LAQ, 2# for standing hip abduction and supine SLR flexion, clamshells with red Theraband    Rationale: increase ROM, increase strength, improve balance and increase proprioception to improve the patients ability to Walk dog for 30min 2x / day without restrictions. Ascend and descend stairs without discomfort. NT min Manual Therapy:  STM/TPR L TFL, glut med, glut max, piriformis and IT band in R side lying;     Rationale: decrease pain, increase ROM, increase tissue extensibility and decrease trigger points  to improve the patients ability to Walk dog for 30min 2x / day without restrictions. Ascend and descend stairs without discomfort. With   [x] TE   [] TA   [] neuro   [] other: Patient Education: [x] Review HEP    [] Progressed/Changed HEP based on:   [] positioning   [] body mechanics   [] transfers:    [x] heat/ice application   [] other:      Other Objective/Functional Measures: nt    Pain Level (0-10 scale) post treatment: 0/10    ASSESSMENT/Changes in Function:     Patient with improved balance during tandem stance. Patient fatigued with increase in weight and reps for standing abduction, supine hip flexion SLR. Patient with increased knee/hip control with step ups. Overall patient tolerated treatment well. Patient will continue to benefit from skilled PT services to modify and progress therapeutic interventions, address functional mobility deficits, address ROM deficits, address strength deficits, analyze and cue movement patterns and instruct in home and community integration to attain remaining goals.      []  See Plan of Care  []  See progress note/recertification  []  See Discharge Summary         Progress towards goals / Updated goals:  nt    PLAN  []  Upgrade activities as tolerated     [x]  Continue plan of care  []  Update interventions per flow sheet       []  Discharge due to:_  [x]  Other: Progress balance/strength exercises next visit, agility ladder, step over drills    Elisa Gill, LEWIS 5/19/2017  12:45 PM

## 2017-05-24 ENCOUNTER — HOSPITAL ENCOUNTER (OUTPATIENT)
Dept: PHYSICAL THERAPY | Age: 67
Discharge: HOME | End: 2017-05-24
Payer: COMMERCIAL

## 2017-05-24 PROCEDURE — 97110 THERAPEUTIC EXERCISES: CPT

## 2017-05-24 NOTE — PROGRESS NOTES
PT DAILY TREATMENT NOTE 2-15    Patient Name: Juanpablo Roach  Date:2017  : 1950  [x]  Patient  Verified  Payor: Surekha Delgado / Plan: Sarah De La Garza / Product Type: PPO /    In time: 1100 AM  Out time: 6922 PM  Total Treatment Time (min): 75  Visit #: 19      Treatment Area: Left hip pain [M25.552]    SUBJECTIVE  Pain Level (0-10 scale): 0/10  Any medication changes, allergies to medications, adverse drug reactions, diagnosis change, or new procedure performed?: [x] No    [] Yes (see summary sheet for update)  Subjective functional status/changes:   [] No changes reported    Patient reports she cont to have discomfort L LE, notices it w/ ascending stairs with that leg, w/ weighting the leg while bringing other leg out to side    OBJECTIVE    Modality rationale: decrease edema, decrease inflammation and decrease pain to improve the patients ability to Walk dog for 30min 2x / day without restrictions. Ascend and descend stairs without discomfort.     Min Type Additional Details    [] Estim: []Att   []Unatt        []TENS instruct                  []IFC  []Premod   []NMES                     []Other:  []w/US   []w/ice   []w/heat  Position:  Location:    []  Traction: [] Cervical       []Lumbar                       [] Prone          []Supine                       []Intermittent   []Continuous Lbs:  [] before manual  [] after manual  []w/heat    []  Ultrasound: []Continuous   [] Pulsed at:                            []1MHz   []3MHz Location:  W/cm2:    []  Paraffin         Location:  []w/heat   10 [x]  Ice     []  Heat  []  Ice massage Position: R s/l   Location: L lateral hip     []  Laser  []  Other: Position:  Location:    []  Vasopneumatic Device Pressure:       [] lo [] med [] hi   Temperature:    [x] Skin assessment post-treatment:  [x]intact []redness- no adverse reaction    []redness  adverse reaction:     60 min Therapeutic Exercise:  [x] See flow sheet: add prone hip flexor stretch w/ strap    Rationale: increase ROM, increase strength, improve balance and increase proprioception to improve the patients ability to Walk dog for 30min 2x / day without restrictions. Ascend and descend stairs without discomfort. 5 min Manual Therapy:  STM/TPR L prox rectus femoris, psoas mm., man hip flexor stretch in prone w/ knee prop on FR     Rationale: decrease pain, increase ROM, increase tissue extensibility and decrease trigger points  to improve the patients ability to Walk dog for 30min 2x / day without restrictions. Ascend and descend stairs without discomfort. With   [x] TE   [] TA   [] neuro   [] other: Patient Education: [x] Review HEP    [x] Progressed/Changed HEP based on: advised pt to add prone hip flexor stretch [] positioning   [] body mechanics   [] transfers:    [x] heat/ice application   [] other:      Other Objective/Functional Measures:   Tender/incr muscle tone L prox rectus fem m., adductor mm., psoas m. Pain Level (0-10 scale) post treatment: 0/10    ASSESSMENT/Changes in Function:     Patient w/ report of reproduction of sx w/ standing hip abd R LE in stance L LE, w/ lat step w/ tband while in stance L & w/ step ups leading w/ L LE; w/ incr tenderness & banding prox L rectus fem m., psoas m., prox hip adductor mm., pt found to have muscle tightness/restriction w/ prone hip flexor stretch and felt that this relieved sx reported; advised pt to perform prone hip flexor stretch at home; pt also w/ report of L side lumb reg discomfort w/ SLR w/ 2 pound weight - advised pt to decr weight and/or hold from ex if cont     Patient will continue to benefit from skilled PT services to modify and progress therapeutic interventions, address functional mobility deficits, address ROM deficits, address strength deficits, analyze and cue movement patterns and instruct in home and community integration to attain remaining goals.      []  See Plan of Care  []  See progress note/recertification  []  See Discharge Summary         Progress towards goals / Updated goals:  nt    PLAN  []  Upgrade activities as tolerated     [x]  Continue plan of care  []  Update interventions per flow sheet       []  Discharge due to:_  [x]  Other: cont x1 visit, reassess, possible d/c w/ independent program     Tomi Lopez, PT 5/24/2017  12:45 PM

## 2017-05-31 ENCOUNTER — HOSPITAL ENCOUNTER (OUTPATIENT)
Dept: PHYSICAL THERAPY | Age: 67
Discharge: HOME | End: 2017-05-31
Payer: COMMERCIAL

## 2017-05-31 PROCEDURE — 97110 THERAPEUTIC EXERCISES: CPT

## 2017-05-31 PROCEDURE — 97140 MANUAL THERAPY 1/> REGIONS: CPT

## 2017-05-31 NOTE — PROGRESS NOTES
PT DAILY TREATMENT NOTE 2-15    Patient Name: Kalpesh Mae  Date:2017  : 1950  [x]  Patient  Verified  Payor: Joyce French / Plan: Juan Arizmendi / Product Type: PPO /    In time: 135 PM  Out time: 240 PM  Total Treatment Time (min): 65  Visit #: 20      Treatment Area: Left hip pain [M25.552]    SUBJECTIVE  Pain Level (0-10 scale): 1-2/10  Any medication changes, allergies to medications, adverse drug reactions, diagnosis change, or new procedure performed?: [x] No    [] Yes (see summary sheet for update)  Subjective functional status/changes:   [] No changes reported    Patient reports she cont to have discomfort L thigh reg, front of hip, thigh and groin reg down to knee; reports cont to have greatest discomfort standing on L LE, also has pain w/ attempting to raise leg to ascend stairs; pt admits only fair compliance w/ recommendation for quad/hip flexor stretch over weekend, has cont to ice, also attempted to do HEP over weekend including SLR; pt reports \"almost got my cane out over the weekend because feel like can't trust leg\"     OBJECTIVE    Modality rationale: decrease edema, decrease inflammation and decrease pain to improve the patients ability to Walk dog for 30min 2x / day without restrictions. Ascend and descend stairs without discomfort.     Min Type Additional Details    [] Estim: []Att   []Unatt        []TENS instruct                  []IFC  []Premod   []NMES                     []Other:  []w/US   []w/ice   []w/heat  Position:  Location:    []  Traction: [] Cervical       []Lumbar                       [] Prone          []Supine                       []Intermittent   []Continuous Lbs:  [] before manual  [] after manual  []w/heat    []  Ultrasound: []Continuous   [] Pulsed at:                            []1MHz   []3MHz Location:  W/cm2:    []  Paraffin         Location:  []w/heat   10 [x]  Ice     []  Heat  []  Ice massage Position: R s/l   Location: L lateral hip []  Laser  []  Other: Position:  Location:    []  Vasopneumatic Device Pressure:       [] lo [] med [] hi   Temperature:    [x] Skin assessment post-treatment:  [x]intact []redness- no adverse reaction    []redness  adverse reaction:     40 min Therapeutic Exercise:  [x] See flow sheet:  Reassessment, modified ex today due to L hip/thigh pain    Rationale: increase ROM, increase strength, improve balance and increase proprioception to improve the patients ability to Walk dog for 30min 2x / day without restrictions. Ascend and descend stairs without discomfort. 10 min Manual Therapy:  STM/TPR L prox rectus femoris, sartorius, adductor,  psoas mm in sup, man hip flexor stretch in prone w/ knee prop on FR     Rationale: decrease pain, increase ROM, increase tissue extensibility and decrease trigger points  to improve the patients ability to Walk dog for 30min 2x / day without restrictions. Ascend and descend stairs without discomfort. With   [x] TE   [] TA   [] neuro   [] other: Patient Education: [x] Review HEP    [x] Progressed/Changed HEP based on: see written/illustrated instructions in chart flexor stretch [] positioning   [] body mechanics   [] transfers:    [x] heat/ice application   [x] other: advised pt to cont ice use, to avoid all painful activities as much as tolerated      Other Objective/Functional Measures:     Strength   MMT L LE   Hip flex 4/5 pain abd, ER 5/5 ext 4/5  Knee flex 5/5 ext 4/5 knee discomfort     abdom brace good seq, hold >10 sec   Bridge good control     Palpation   Tender/incr muscle tone L prox rectus fem, sartorius, adductor & psoas mm. Gait  Mild antalgic L    Pain Level (0-10 scale) post treatment: 1/10    ASSESSMENT/Changes in Function:     Pt was intending for today to be her last visit however discussed w/ pt cont PT at this time due to incr pain L hip flexor, adductor mm.  Groups; advised pt to avoid painful activities w/ ADLs as well as w/ HEP; advised hold from SLR and active hip flex ex as feel this may be contributing to aggravate sx; to incr ice and perform modified HEP as recommended and f/u ~ 1 week     Patient will continue to benefit from skilled PT services to modify and progress therapeutic interventions, address functional mobility deficits, address ROM deficits, address strength deficits, analyze and cue movement patterns and instruct in home and community integration to attain remaining goals.      []  See Plan of Care  []  See progress note/recertification  []  See Discharge Summary         Progress towards goals / Updated goals:  nt    PLAN  []  Upgrade activities as tolerated     []  Continue plan of care  []  Update interventions per flow sheet       []  Discharge due to:_  [x]  Other: see assessment, modified HEP & activity, f/u 1 week, if no change in sx refer to MD Randy Lima, PT 5/31/2017  1:45 PM

## 2017-06-09 ENCOUNTER — HOSPITAL ENCOUNTER (OUTPATIENT)
Dept: PHYSICAL THERAPY | Age: 67
Discharge: HOME | End: 2017-06-09
Payer: COMMERCIAL

## 2017-06-09 PROCEDURE — 97110 THERAPEUTIC EXERCISES: CPT

## 2017-06-09 PROCEDURE — 97140 MANUAL THERAPY 1/> REGIONS: CPT

## 2017-06-09 NOTE — PROGRESS NOTES
PT DAILY TREATMENT NOTE 2-15    Patient Name: Irma Genjaren  Date:2017  : 1950  [x]  Patient  Verified  Payor: Moises Ames / Plan: Rd Sas / Product Type: PPO /    In time: 1240 PM  Out time: 140 PM  Total Treatment Time (min): 60  Visit #: 21      Treatment Area: Left hip pain [M25.552]    SUBJECTIVE  Pain Level (0-10 scale): 1-2/10  Any medication changes, allergies to medications, adverse drug reactions, diagnosis change, or new procedure performed?: [x] No    [] Yes (see summary sheet for update)  Subjective functional status/changes:   [] No changes reported    Patient reports has had no to min pain in R ant thigh reg for last ~ 1 week, ~ 2-3 days after last visit; has been doing recommended exercises at home w/out any aggravation of sx; 1-2/10 pain today post aspect L hip/lumb reg, also w/ c/o R knee pain     OBJECTIVE    Modality rationale: decrease edema, decrease inflammation and decrease pain to improve the patients ability to Walk dog for 30min 2x / day without restrictions. Ascend and descend stairs without discomfort.     Min Type Additional Details    [] Estim: []Att   []Unatt        []TENS instruct                  []IFC  []Premod   []NMES                     []Other:  []w/US   []w/ice   []w/heat  Position:  Location:    []  Traction: [] Cervical       []Lumbar                       [] Prone          []Supine                       []Intermittent   []Continuous Lbs:  [] before manual  [] after manual  []w/heat    []  Ultrasound: []Continuous   [] Pulsed at:                            []1MHz   []3MHz Location:  W/cm2:    []  Paraffin         Location:  []w/heat   10 [x]  Ice     []  Heat  []  Ice massage Position: R s/l   Location: L lateral hip     []  Laser  []  Other: Position:  Location:    []  Vasopneumatic Device Pressure:       [] lo [] med [] hi   Temperature:    [x] Skin assessment post-treatment:  [x]intact []redness- no adverse reaction    []redness  adverse reaction:     40 min Therapeutic Exercise:  [x] See flow sheet: modified ex to avoid aggravating L hip flexor      Rationale: increase ROM, increase strength, improve balance and increase proprioception to improve the patients ability to Walk dog for 30min 2x / day without restrictions. Ascend and descend stairs without discomfort. 10 min Manual Therapy:  STM/TPR L prox rectus femoris, sartorius, adductor,  psoas mm in sup, man hip flexor stretch in prone w/ knee prop on FR     Rationale: decrease pain, increase ROM, increase tissue extensibility and decrease trigger points  to improve the patients ability to Walk dog for 30min 2x / day without restrictions. Ascend and descend stairs without discomfort. With   [x] TE   [] TA   [] neuro   [] other: Patient Education: [x] Review HEP    [x] Progressed/Changed HEP based on:   [] positioning   [] body mechanics   [] transfers:    [x] heat/ice application   [x] other: advised pt to cont ice use, to gradually progress ex as buffy continuing to avoid hip flexion ex       Other Objective/Functional Measures:   nt    Pain Level (0-10 scale) post treatment: 0/10    ASSESSMENT/Changes in Function:     Pt w/ decr tenderness L hip flexor/adductor mm. Today, w/ decr report of pain and incr buffy w/ ex; able to stand SLS L LE w/out ant thigh/knee pain     Patient will continue to benefit from skilled PT services to modify and progress therapeutic interventions, address functional mobility deficits, address ROM deficits, address strength deficits, analyze and cue movement patterns and instruct in home and community integration to attain remaining goals.      []  See Plan of Care  []  See progress note/recertification  []  See Discharge Summary         Progress towards goals / Updated goals:  nt    PLAN  []  Upgrade activities as tolerated     [x]  Continue plan of care  []  Update interventions per flow sheet       []  Discharge due to:_  []  Other:      Silverton Hoof Yasmin Fothergill, PT 6/9/2017  1:20 PM

## 2017-06-19 ENCOUNTER — APPOINTMENT (OUTPATIENT)
Dept: PHYSICAL THERAPY | Age: 67
End: 2017-06-19
Payer: COMMERCIAL

## 2017-06-22 ENCOUNTER — HOSPITAL ENCOUNTER (OUTPATIENT)
Dept: PHYSICAL THERAPY | Age: 67
Discharge: HOME | End: 2017-06-22
Payer: COMMERCIAL

## 2017-06-22 PROCEDURE — 97110 THERAPEUTIC EXERCISES: CPT | Performed by: PHYSICAL THERAPY ASSISTANT

## 2017-06-22 PROCEDURE — 97140 MANUAL THERAPY 1/> REGIONS: CPT | Performed by: PHYSICAL THERAPY ASSISTANT

## 2017-06-22 NOTE — PROGRESS NOTES
PT DAILY TREATMENT NOTE 2-15    Patient Name: Derrell Alejandre  Date:2017  : 1950  [x]  Patient  Verified  Payor: Maximo Banks / Plan: Isabelle Allan / Product Type: PPO /    In time: 3:10 PM  Out time: 4:05PM  Total Treatment Time (min): 55  Visit #: 22      Treatment Area: Left hip pain [M25.552]    SUBJECTIVE  Pain Level (0-10 scale): 0/10  Any medication changes, allergies to medications, adverse drug reactions, diagnosis change, or new procedure performed?: [x] No    [] Yes (see summary sheet for update)  Subjective functional status/changes:   [] No changes reported    Patient reports \"I have had a twinge once or twice over the past week but went away. Right now the biggest discomfort is my R knee. \"      OBJECTIVE    Modality rationale: decrease edema, decrease inflammation and decrease pain to improve the patients ability to Walk dog for 30min 2x / day without restrictions. Ascend and descend stairs without discomfort.     Min Type Additional Details    [] Estim: []Att   []Unatt        []TENS instruct                  []IFC  []Premod   []NMES                     []Other:  []w/US   []w/ice   []w/heat  Position:  Location:    []  Traction: [] Cervical       []Lumbar                       [] Prone          []Supine                       []Intermittent   []Continuous Lbs:  [] before manual  [] after manual  []w/heat    []  Ultrasound: []Continuous   [] Pulsed at:                            []1MHz   []3MHz Location:  W/cm2:    []  Paraffin         Location:  []w/heat   10 [x]  Ice     []  Heat  []  Ice massage Position: R s/l   Location: L lateral hip     []  Laser  []  Other: Position:  Location:    []  Vasopneumatic Device Pressure:       [] lo [] med [] hi   Temperature:    [x] Skin assessment post-treatment:  [x]intact []redness- no adverse reaction    []redness  adverse reaction:     35 min Therapeutic Exercise:  [x] See flow sheet: modified ex to avoid aggravating L hip flexor Rationale: increase ROM, increase strength, improve balance and increase proprioception to improve the patients ability to Walk dog for 30min 2x / day without restrictions. Ascend and descend stairs without discomfort. 10 min Manual Therapy:  STM/TPR L prox rectus femoris, sartorius, adductor,  psoas mm in sup, man hip flexor stretch in prone w/ knee prop on FR     Rationale: decrease pain, increase ROM, increase tissue extensibility and decrease trigger points  to improve the patients ability to Walk dog for 30min 2x / day without restrictions. Ascend and descend stairs without discomfort. With   [x] TE   [] TA   [] neuro   [] other: Patient Education: [x] Review HEP    [x] Progressed/Changed HEP based on:   [] positioning   [] body mechanics   [] transfers:    [x] heat/ice application   [x] other: advised pt to cont ice use, to gradually progress ex as buffy continuing to avoid hip flexion ex       Other Objective/Functional Measures:   nt    Pain Level (0-10 scale) post treatment: 0/10    ASSESSMENT/Changes in Function:   Patient able to perform exercises without hip pain, but noted increased R knee pain with TG LP. Decreased incline to 30 degrees and patient able to perform without R knee pain. Decreased TTP R hip flexor/adductor muscles today. Patient will continue to benefit from skilled PT services to modify and progress therapeutic interventions, address functional mobility deficits, address ROM deficits, address strength deficits, analyze and cue movement patterns and instruct in home and community integration to attain remaining goals.      []  See Plan of Care  []  See progress note/recertification  []  See Discharge Summary         Progress towards goals / Updated goals:  nt    PLAN  []  Upgrade activities as tolerated     [x]  Continue plan of care  []  Update interventions per flow sheet       []  Discharge due to:_  []  Other:      Alexandria Cerna PTA 6/22/2017  3:10 PM

## 2017-06-26 ENCOUNTER — APPOINTMENT (OUTPATIENT)
Dept: PHYSICAL THERAPY | Age: 67
End: 2017-06-26
Payer: COMMERCIAL

## 2017-06-28 ENCOUNTER — APPOINTMENT (OUTPATIENT)
Dept: PHYSICAL THERAPY | Age: 67
End: 2017-06-28
Payer: COMMERCIAL

## 2017-06-30 ENCOUNTER — HOSPITAL ENCOUNTER (OUTPATIENT)
Dept: PHYSICAL THERAPY | Age: 67
Discharge: HOME | End: 2017-06-30
Payer: COMMERCIAL

## 2017-06-30 ENCOUNTER — TELEPHONE (OUTPATIENT)
Dept: INTERNAL MEDICINE CLINIC | Age: 67
End: 2017-06-30

## 2017-06-30 PROCEDURE — 97110 THERAPEUTIC EXERCISES: CPT

## 2017-06-30 NOTE — TELEPHONE ENCOUNTER
Pt called and states that we are calling her to remind her to setup her wellness visit. She wants the complete physical but first available is 10/2017. She doesn't want to wait that long and states that we are calling her. She also stated she is a [employer] employee. She wants to know if she needs to come in for physical or just a follow up visit.

## 2017-06-30 NOTE — PROGRESS NOTES
PT DAILY TREATMENT NOTE/DISCHARGE SUMMARY 2-15    Patient Name: Maryuri Given  Date:2017  : 1950  [x]  Patient  Verified  Payor: Sebastian Ramirez / Plan: Aldair Lock / Product Type: PPO /    In time:  935 aM  Out time: 10:35 aM  Total Treatment Time (min): 60  Visit #: 23      Treatment Area: Left hip pain [M25.552]    SUBJECTIVE  Pain Level (0-10 scale): 0/10  Any medication changes, allergies to medications, adverse drug reactions, diagnosis change, or new procedure performed?: [x] No    [] Yes (see summary sheet for update)  Subjective functional status/changes:   [] No changes reported    Patient reports w/out c/o L hip/thigh pain, knees cont to be greatest limiting factor; scheduled to f/u w/ MD in couple months for 6 month follow up and will discuss knee pain w/ MD at that time; reports that she has been doing ex at home and hopes to incr swimming/pool ex for exercises this summer    OBJECTIVE    Modality rationale: decrease edema, decrease inflammation and decrease pain to improve the patients ability to Walk dog for 30min 2x / day without restrictions. Ascend and descend stairs without discomfort.     Min Type Additional Details    [] Estim: []Att   []Unatt        []TENS instruct                  []IFC  []Premod   []NMES                     []Other:  []w/US   []w/ice   []w/heat  Position:  Location:    []  Traction: [] Cervical       []Lumbar                       [] Prone          []Supine                       []Intermittent   []Continuous Lbs:  [] before manual  [] after manual  []w/heat    []  Ultrasound: []Continuous   [] Pulsed at:                            []1MHz   []3MHz Location:  W/cm2:    []  Paraffin         Location:  []w/heat   10 [x]  Ice     []  Heat  []  Ice massage Position: R s/l   Location: L lateral hip     []  Laser  []  Other: Position:  Location:    []  Vasopneumatic Device Pressure:       [] lo [] med [] hi   Temperature:    [x] Skin assessment post-treatment:  [x]intact []redness- no adverse reaction    []redness  adverse reaction:     50 min Therapeutic Exercise:  [x] See flow sheet: reassess L hip, review recommendations HEP       Rationale: increase ROM, increase strength, improve balance and increase proprioception to improve the patients ability to Walk dog for 30min 2x / day without restrictions. Ascend and descend stairs without discomfort. With   [x] TE   [] TA   [] neuro   [] other: Patient Education: [x] Review HEP/independent program     [] Progressed/Changed HEP based on:   [] positioning   [] body mechanics   [] transfers:    [x] heat/ice application   [] other:        Other Objective/Functional Measures:     Strength  MMT L hip abd 4/5 all other motions 5/5  Bridge good control  abdom brace good seq, hold 10 sec     Palpation  No specific tenderness to palpation     ROM  L hip hypo ER, all other motions WNL    FOTO score 67/100    Pain Level (0-10 scale) post treatment: 0/10    ASSESSMENT/Changes in Function:     Pt w/ decr report of pain, functional limitation; she demonstrates incr pain free ROM, incr strength, improved functional mobility, w/out tenderness to palpation; pt has met 9 of 11 goals and demonstrates understanding independent program recommendations      []  See Plan of Care  []  See progress note/recertification  [x]  See Discharge Summary         Progress towards goals / Updated goals:  Short Term Goals: To be accomplished in 2 weeks:                         1. Pt will be independent with HEP to allow continued progression of functional activities. met                        2. Pt will report >/= 25% decrease in symptoms to increase quality of life. met                        3. Stand greater than 15 minutes without increase of pain so that can complete light household chores such as cleaning dishes.  Met                        4. Ambulate greater than 2 blocks without increase of pain to allow continued progression of walking program. met                        5. Pt will demonstrate proper abdominal brace sequencing and ability to hold >/= 10 seconds for improved core strength. met     Long Term Goals: To be accomplished in 6-8 weeks:                        1. Pt will be able to stand greater than 60 minutes without increase pain so that can to allow household cleaning without restrictions. met                        2. Pt will be able to ambulate greater than 6 blocks without increase of pain to allow return to previous walking routine and increase general wellness. met                        3. Pt will demonstrate independence with modified exercise/gym routine without aggravation of symptoms. 4. Pt will report able to sleep through night without pain to increase quality of life. 5. Pt will be able to perform 10 consecutive bridge w/ march w/out hip drop to demonstrate improved core strength in stance. Not met                        6. Pt will demonstrate step over step pattern while ascending and descending 13 steps to reduce restrictions while at home.  Working towards     [employer]  []  Upgrade activities as tolerated     []  Continue plan of care  []  Update interventions per flow sheet       [x]  Discharge due to: working toward or has met all goals  []  Other:      Bradley Good, PT 6/30/2017  1030 AM

## 2017-07-01 NOTE — TELEPHONE ENCOUNTER
Please call her- she needs a med check appointment - her last physical was August 16th so she cannot have one until after that but she needs labs done for her meds that she is on every 6 months and she never came in for 6 months med check- please have her come in for that and then we can figure out the physical

## 2017-07-11 ENCOUNTER — APPOINTMENT (OUTPATIENT)
Dept: PHYSICAL THERAPY | Age: 67
End: 2017-07-11

## 2017-07-12 DIAGNOSIS — I10 ESSENTIAL HYPERTENSION WITH GOAL BLOOD PRESSURE LESS THAN 130/80: ICD-10-CM

## 2017-07-12 RX ORDER — HYDROCHLOROTHIAZIDE 25 MG/1
25 TABLET ORAL DAILY
Qty: 30 TAB | Refills: 0 | Status: SHIPPED | OUTPATIENT
Start: 2017-07-12 | End: 2017-07-13 | Stop reason: SDUPTHER

## 2017-07-12 RX ORDER — SIMVASTATIN 40 MG/1
40 TABLET, FILM COATED ORAL
Qty: 30 TAB | Refills: 0 | Status: SHIPPED | OUTPATIENT
Start: 2017-07-12 | End: 2017-07-13

## 2017-07-13 ENCOUNTER — OFFICE VISIT (OUTPATIENT)
Dept: INTERNAL MEDICINE CLINIC | Age: 67
End: 2017-07-13

## 2017-07-13 VITALS
BODY MASS INDEX: 30.98 KG/M2 | WEIGHT: 192.8 LBS | TEMPERATURE: 97.9 F | OXYGEN SATURATION: 99 % | HEART RATE: 83 BPM | SYSTOLIC BLOOD PRESSURE: 131 MMHG | RESPIRATION RATE: 14 BRPM | HEIGHT: 66 IN | DIASTOLIC BLOOD PRESSURE: 72 MMHG

## 2017-07-13 DIAGNOSIS — Z12.11 SCREENING FOR COLON CANCER: ICD-10-CM

## 2017-07-13 DIAGNOSIS — I10 ESSENTIAL HYPERTENSION: Primary | ICD-10-CM

## 2017-07-13 DIAGNOSIS — D50.9 IRON DEFICIENCY ANEMIA, UNSPECIFIED IRON DEFICIENCY ANEMIA TYPE: ICD-10-CM

## 2017-07-13 DIAGNOSIS — E78.00 HYPERCHOLESTEREMIA: ICD-10-CM

## 2017-07-13 DIAGNOSIS — Z00.00 ROUTINE GENERAL MEDICAL EXAMINATION AT A HEALTH CARE FACILITY: ICD-10-CM

## 2017-07-13 RX ORDER — HYDROCHLOROTHIAZIDE 25 MG/1
25 TABLET ORAL DAILY
Qty: 90 TAB | Refills: 1 | Status: SHIPPED | OUTPATIENT
Start: 2017-07-13 | End: 2018-02-11 | Stop reason: SDUPTHER

## 2017-07-13 RX ORDER — SIMVASTATIN 40 MG/1
40 TABLET, FILM COATED ORAL
Qty: 90 TAB | Refills: 1 | Status: SHIPPED | OUTPATIENT
Start: 2017-07-13 | End: 2018-01-07 | Stop reason: SDUPTHER

## 2017-07-13 NOTE — PROGRESS NOTES
HISTORY OF PRESENT ILLNESS  Jon Britton is a 69 y.o. female. HPI   Presents for follow up. Patient reports she got a new hip in February, followed by Dr. Ivan Gaxiola. She states she is feeling better and is almost back to herself. Patient has been doing PT with relief. She states she has been having knee problems. She states she has been trying to swim and walk daily to help her muscles. Hypertension ROS: taking medications as instructed, no medication side effects noted, no TIA's, no chest pain on exertion, no dyspnea on exertion, no swelling of ankles. New concerns:  Patient's BP in office today is 131/72. Patient is taking hydrochlorothiazide. Hypercholesteremia:  Cardiovascular risk analysis - 69 y.o. female LDL goal is under 130. ROS: taking medications as instructed, no medication side effects noted, no TIA's, no chest pain on exertion, no dyspnea on exertion, no swelling of ankles. Tolerating meds, no myalgias or other side effects noted  New concerns: Patient states she has been tolerating her. Patient has not done colonoscopy. Patient confirms she has gotten the Prevnar 13 and shingles vaccination. Review of Systems   All other systems reviewed and are negative. Physical Exam   Constitutional: She is oriented to person, place, and time. She appears well-developed and well-nourished. HENT:   Head: Normocephalic and atraumatic. Right Ear: External ear normal.   Left Ear: External ear normal.   Nose: Nose normal.   Mouth/Throat: Oropharynx is clear and moist.   Some fluid in ears. Eyes: Conjunctivae and EOM are normal.   Neck: Normal range of motion. Neck supple. Carotid bruit is not present. No thyroid mass and no thyromegaly present. Cardiovascular: Normal rate, regular rhythm, S1 normal, S2 normal, normal heart sounds and intact distal pulses. Pulmonary/Chest: Effort normal and breath sounds normal.   Abdominal: Soft.  Normal appearance and bowel sounds are normal. There is no hepatosplenomegaly. There is no tenderness. Musculoskeletal: Normal range of motion. Neurological: She is alert and oriented to person, place, and time. She has normal strength. No cranial nerve deficit or sensory deficit. Coordination normal.   Skin: Skin is warm, dry and intact. No abrasion and no rash noted. Psychiatric: She has a normal mood and affect. Her behavior is normal. Judgment and thought content normal.   Nursing note and vitals reviewed. ASSESSMENT and Ning Nixon was seen today for medication evaluation. Diagnoses and all orders for this visit:    Routine general medical examination at a health care facility  -     HEMOGLOBIN A1C WITH EAG    Hypercholesteremia  Cholesterol stable. I do not recommend a change in medication. Check labs. -     LIPID PANEL  -     simvastatin (ZOCOR) 40 mg tablet; Take 1 Tab by mouth nightly for 30 days. Iron deficiency anemia, unspecified iron deficiency anemia type  Patient has history of anemia. Check labs. -     CBC W/O DIFF    Essential hypertension  BP is at goal. I do not recommend any change in medications.  -     METABOLIC PANEL, COMPREHENSIVE  -     hydroCHLOROthiazide (HYDRODIURIL) 25 mg tablet; Take 1 Tab by mouth daily for 30 days. Screening for colon cancer  Encouraged patient to follow up with GI for colonoscopy. lab results and schedule of future lab studies reviewed with patient  reviewed diet, exercise and weight control    Written by Shu Chandra, as dictated by Ellen Quispe MD.     Current diagnosis and concerns discussed with pt at length. Understands risks and benefits or current treatment plan and medications and accepts the treatment and medication with any possible risks. Pt asks appropriate questions which were answered. Pt instructed to call with any concerns or problems.

## 2017-07-14 LAB
ALBUMIN SERPL-MCNC: 4.5 G/DL (ref 3.6–4.8)
ALBUMIN/GLOB SERPL: 1.7 {RATIO} (ref 1.2–2.2)
ALP SERPL-CCNC: 60 IU/L (ref 39–117)
ALT SERPL-CCNC: 23 IU/L (ref 0–32)
AST SERPL-CCNC: 30 IU/L (ref 0–40)
BILIRUB SERPL-MCNC: 0.4 MG/DL (ref 0–1.2)
BUN SERPL-MCNC: 11 MG/DL (ref 8–27)
BUN/CREAT SERPL: 15 (ref 12–28)
CALCIUM SERPL-MCNC: 10.2 MG/DL (ref 8.7–10.3)
CHLORIDE SERPL-SCNC: 97 MMOL/L (ref 96–106)
CHOLEST SERPL-MCNC: 240 MG/DL (ref 100–199)
CO2 SERPL-SCNC: 26 MMOL/L (ref 18–29)
CREAT SERPL-MCNC: 0.72 MG/DL (ref 0.57–1)
ERYTHROCYTE [DISTWIDTH] IN BLOOD BY AUTOMATED COUNT: 14 % (ref 12.3–15.4)
EST. AVERAGE GLUCOSE BLD GHB EST-MCNC: 111 MG/DL
GLOBULIN SER CALC-MCNC: 2.6 G/DL (ref 1.5–4.5)
GLUCOSE SERPL-MCNC: 88 MG/DL (ref 65–99)
HBA1C MFR BLD: 5.5 % (ref 4.8–5.6)
HCT VFR BLD AUTO: 39.4 % (ref 34–46.6)
HDLC SERPL-MCNC: 72 MG/DL
HGB BLD-MCNC: 12.8 G/DL (ref 11.1–15.9)
INTERPRETATION, 910389: NORMAL
LDLC SERPL CALC-MCNC: 137 MG/DL (ref 0–99)
MCH RBC QN AUTO: 29.8 PG (ref 26.6–33)
MCHC RBC AUTO-ENTMCNC: 32.5 G/DL (ref 31.5–35.7)
MCV RBC AUTO: 92 FL (ref 79–97)
PLATELET # BLD AUTO: 294 X10E3/UL (ref 150–379)
POTASSIUM SERPL-SCNC: 4.2 MMOL/L (ref 3.5–5.2)
PROT SERPL-MCNC: 7.1 G/DL (ref 6–8.5)
RBC # BLD AUTO: 4.29 X10E6/UL (ref 3.77–5.28)
SODIUM SERPL-SCNC: 141 MMOL/L (ref 134–144)
TRIGL SERPL-MCNC: 155 MG/DL (ref 0–149)
VLDLC SERPL CALC-MCNC: 31 MG/DL (ref 5–40)
WBC # BLD AUTO: 5.2 X10E3/UL (ref 3.4–10.8)

## 2017-08-26 ENCOUNTER — TELEPHONE (OUTPATIENT)
Dept: INTERNAL MEDICINE CLINIC | Age: 67
End: 2017-08-26

## 2017-08-31 ENCOUNTER — TELEPHONE (OUTPATIENT)
Dept: INTERNAL MEDICINE CLINIC | Age: 67
End: 2017-08-31

## 2017-09-05 NOTE — TELEPHONE ENCOUNTER
Please let her know to make a follow up appointment with me next week once things settled down with the eye- the eye is the most important to take care of and the main treatment she needs now

## 2017-12-19 ENCOUNTER — HOSPITAL ENCOUNTER (OUTPATIENT)
Dept: MAMMOGRAPHY | Age: 67
Discharge: HOME | End: 2017-12-19
Attending: INTERNAL MEDICINE
Payer: COMMERCIAL

## 2017-12-19 DIAGNOSIS — Z12.31 VISIT FOR SCREENING MAMMOGRAM: ICD-10-CM

## 2017-12-19 PROCEDURE — 77067 SCR MAMMO BI INCL CAD: CPT

## 2018-01-07 DIAGNOSIS — E78.00 HYPERCHOLESTEREMIA: ICD-10-CM

## 2018-01-07 RX ORDER — SIMVASTATIN 40 MG/1
TABLET, FILM COATED ORAL
Qty: 90 TAB | Refills: 1 | Status: SHIPPED | OUTPATIENT
Start: 2018-01-07 | End: 2018-05-29 | Stop reason: SDUPTHER

## 2018-01-23 ENCOUNTER — ANESTHESIA EVENT (OUTPATIENT)
Dept: ENDOSCOPY | Age: 68
End: 2018-01-23
Payer: COMMERCIAL

## 2018-01-23 ENCOUNTER — HOSPITAL ENCOUNTER (OUTPATIENT)
Age: 68
Setting detail: OUTPATIENT SURGERY
Discharge: HOME | End: 2018-01-23
Attending: SPECIALIST | Admitting: SPECIALIST
Payer: COMMERCIAL

## 2018-01-23 ENCOUNTER — ANESTHESIA (OUTPATIENT)
Dept: ENDOSCOPY | Age: 68
End: 2018-01-23
Payer: COMMERCIAL

## 2018-01-23 VITALS
HEART RATE: 65 BPM | RESPIRATION RATE: 18 BRPM | DIASTOLIC BLOOD PRESSURE: 75 MMHG | TEMPERATURE: 97.3 F | SYSTOLIC BLOOD PRESSURE: 136 MMHG | OXYGEN SATURATION: 99 %

## 2018-01-23 PROCEDURE — 77030027957 HC TBNG IRR ENDOGTR BUSS -B: Performed by: SPECIALIST

## 2018-01-23 PROCEDURE — 74011250636 HC RX REV CODE- 250/636: Performed by: SPECIALIST

## 2018-01-23 PROCEDURE — 74011000250 HC RX REV CODE- 250

## 2018-01-23 PROCEDURE — 74011250636 HC RX REV CODE- 250/636

## 2018-01-23 PROCEDURE — 76040000007: Performed by: SPECIALIST

## 2018-01-23 PROCEDURE — 76060000032 HC ANESTHESIA 0.5 TO 1 HR: Performed by: SPECIALIST

## 2018-01-23 RX ORDER — ATROPINE SULFATE 0.1 MG/ML
0.5 INJECTION INTRAVENOUS
Status: DISCONTINUED | OUTPATIENT
Start: 2018-01-23 | End: 2018-01-23 | Stop reason: HOSPADM

## 2018-01-23 RX ORDER — FENTANYL CITRATE 50 UG/ML
200 INJECTION, SOLUTION INTRAMUSCULAR; INTRAVENOUS
Status: DISCONTINUED | OUTPATIENT
Start: 2018-01-23 | End: 2018-01-23 | Stop reason: HOSPADM

## 2018-01-23 RX ORDER — SODIUM CHLORIDE 0.9 % (FLUSH) 0.9 %
5-10 SYRINGE (ML) INJECTION AS NEEDED
Status: DISCONTINUED | OUTPATIENT
Start: 2018-01-23 | End: 2018-01-23 | Stop reason: HOSPADM

## 2018-01-23 RX ORDER — MIDAZOLAM HYDROCHLORIDE 1 MG/ML
.25-1 INJECTION, SOLUTION INTRAMUSCULAR; INTRAVENOUS
Status: DISCONTINUED | OUTPATIENT
Start: 2018-01-23 | End: 2018-01-23 | Stop reason: HOSPADM

## 2018-01-23 RX ORDER — SODIUM CHLORIDE 0.9 % (FLUSH) 0.9 %
5-10 SYRINGE (ML) INJECTION EVERY 8 HOURS
Status: DISCONTINUED | OUTPATIENT
Start: 2018-01-23 | End: 2018-01-23 | Stop reason: HOSPADM

## 2018-01-23 RX ORDER — DEXTROMETHORPHAN/PSEUDOEPHED 2.5-7.5/.8
1.2 DROPS ORAL
Status: DISCONTINUED | OUTPATIENT
Start: 2018-01-23 | End: 2018-01-23 | Stop reason: HOSPADM

## 2018-01-23 RX ORDER — PROPOFOL 10 MG/ML
INJECTION, EMULSION INTRAVENOUS AS NEEDED
Status: DISCONTINUED | OUTPATIENT
Start: 2018-01-23 | End: 2018-01-23 | Stop reason: HOSPADM

## 2018-01-23 RX ORDER — SODIUM CHLORIDE 9 MG/ML
INJECTION, SOLUTION INTRAVENOUS
Status: DISCONTINUED | OUTPATIENT
Start: 2018-01-23 | End: 2018-01-23 | Stop reason: HOSPADM

## 2018-01-23 RX ORDER — LIDOCAINE HYDROCHLORIDE 20 MG/ML
INJECTION, SOLUTION EPIDURAL; INFILTRATION; INTRACAUDAL; PERINEURAL AS NEEDED
Status: DISCONTINUED | OUTPATIENT
Start: 2018-01-23 | End: 2018-01-23 | Stop reason: HOSPADM

## 2018-01-23 RX ORDER — FLUMAZENIL 0.1 MG/ML
0.2 INJECTION INTRAVENOUS
Status: DISCONTINUED | OUTPATIENT
Start: 2018-01-23 | End: 2018-01-23 | Stop reason: HOSPADM

## 2018-01-23 RX ORDER — EPINEPHRINE 0.1 MG/ML
1 INJECTION INTRACARDIAC; INTRAVENOUS
Status: DISCONTINUED | OUTPATIENT
Start: 2018-01-23 | End: 2018-01-23 | Stop reason: HOSPADM

## 2018-01-23 RX ORDER — NALOXONE HYDROCHLORIDE 0.4 MG/ML
0.4 INJECTION, SOLUTION INTRAMUSCULAR; INTRAVENOUS; SUBCUTANEOUS
Status: DISCONTINUED | OUTPATIENT
Start: 2018-01-23 | End: 2018-01-23 | Stop reason: HOSPADM

## 2018-01-23 RX ORDER — SODIUM CHLORIDE 9 MG/ML
50 INJECTION, SOLUTION INTRAVENOUS CONTINUOUS
Status: DISCONTINUED | OUTPATIENT
Start: 2018-01-23 | End: 2018-01-23 | Stop reason: HOSPADM

## 2018-01-23 RX ADMIN — PROPOFOL 100 MG: 10 INJECTION, EMULSION INTRAVENOUS at 09:20

## 2018-01-23 RX ADMIN — SODIUM CHLORIDE: 9 INJECTION, SOLUTION INTRAVENOUS at 09:00

## 2018-01-23 RX ADMIN — PROPOFOL 50 MG: 10 INJECTION, EMULSION INTRAVENOUS at 09:12

## 2018-01-23 RX ADMIN — PROPOFOL 25 MG: 10 INJECTION, EMULSION INTRAVENOUS at 09:41

## 2018-01-23 RX ADMIN — PROPOFOL 50 MG: 10 INJECTION, EMULSION INTRAVENOUS at 09:10

## 2018-01-23 RX ADMIN — LIDOCAINE HYDROCHLORIDE 60 MG: 20 INJECTION, SOLUTION EPIDURAL; INFILTRATION; INTRACAUDAL; PERINEURAL at 09:12

## 2018-01-23 NOTE — ANESTHESIA POSTPROCEDURE EVALUATION
Post-Anesthesia Evaluation and Assessment    Patient: Obinna Elena MRN: [id number]  SSN: [id number]    YOB: 1950  Age: 77 y.o. Sex: female       Cardiovascular Function/Vital Signs  Visit Vitals    /74    Pulse 63    Temp 36.3 °C (97.3 °F)    Resp 16    SpO2 96%       Patient is status post MAC anesthesia for Procedure(s):  COLONOSCOPY. Nausea/Vomiting: None    Postoperative hydration reviewed and adequate. Pain:  Pain Scale 1: Numeric (0 - 10) (01/23/18 1000)  Pain Intensity 1: 0 (01/23/18 1000)   Managed    Neurological Status: At baseline    Mental Status and Level of Consciousness: Arousable    Pulmonary Status:   O2 Device: Room air (01/23/18 1000)   Adequate oxygenation and airway patent    Complications related to anesthesia: None    Post-anesthesia assessment completed.  No concerns    Signed By: Soraida Cedillo DO     January 23, 2018

## 2018-01-23 NOTE — PERIOP NOTES
Sue Sheehan Endoscope was pre-cleaned at bedside immediately following procedure by GEO Phelan Adjutant

## 2018-01-23 NOTE — H&P
Colonoscopy History and Physical      The patient was seen and examined. Date of last colonoscopy: 2006, Polyps  No      The airway was assessed and documented. The problem list, past medical history, and medications were reviewed. Patient Active Problem List   Diagnosis Code    Other ill-defined conditions(799.89) R69    Hypercholesteremia E78.00    Hepatitis K75.9    Medial meniscus tear S80.200A    OA (osteoarthritis) of knee M17.10    Hypercalcemia E83.52    Osteopenia M85.80    Hx of colonoscopy Z98.890    Constipation K59.00    Idiopathic hypercalciuria E83.52    Primary localized osteoarthritis of left hip M16.12     Social History     Social History    Marital status: SINGLE     Spouse name: N/A    Number of children: N/A    Years of education: N/A     Occupational History    Not on file. Social History Main Topics    Smoking status: Never Smoker    Smokeless tobacco: Never Used    Alcohol use 1.5 - 3.5 oz/week     3 - 7 Standard drinks or equivalent per week      Comment: social glass of wine or beer nightly    Drug use: No    Sexual activity: No     Other Topics Concern    Not on file     Social History Narrative     Past Medical History:   Diagnosis Date    Arthritis     L knee OA both knees per pt    Hypercholesterolemia     Hypertension     Ill-defined condition     obese  BMI= 30.7 on 1/25/2017    Liver disease     H/O HEPATITIS A  contracted while working in [facility] Other ill-defined conditions(799.89) [address]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 Taking? FLAXSEED OIL (OMEGA 3 PO) Not Taking at Unknown time  Yes No   Sig: Take 1 Cap by mouth daily. OTHER 1/22/2018 at Unknown time  Yes Yes   Sig: Juice plus for vegetable and fruit supplementation    aspirin (ASPIRIN) 325 mg tablet Not Taking at Unknown time  No No   Sig: Take 1 Tab by mouth two (2) times a day. aspirin delayed-release 81 mg tablet 12/23/2017 at Unknown time  Yes Yes   Sig: take 81 mg by mouth daily. b complex vitamins tablet 1/16/2018 at Unknown time  Yes Yes   Sig: Take 1 Tab by mouth daily. gabapentin (NEURONTIN) 300 mg capsule Not Taking at Unknown time  No No   Sig: TAKE ONE CAPSULE BY MOUTH 3 TIMES A DAY FOR 10 DAYS. glucosam-chond-msm-boron-hyal 828-23.0-315-6 mg tab 1/10/2018  Yes No   Sig: Take 1 Tab by mouth daily. hydroCHLOROthiazide (HYDRODIURIL) 25 mg tablet   No No   Sig: Take 1 Tab by mouth daily for 30 days. ibuprofen (MOTRIN) 800 mg tablet 1/22/2018 at Unknown time  Yes Yes   Sig: Take 800 mg by mouth every six (6) hours as needed for Pain. Indications: OSTEOARTHRITIS   melatonin tab tablet Not Taking at Unknown time  Yes No   Sig: Take 5 mg by mouth nightly. Indications: 2 tabltes   multivitamin (ONE A DAY) tablet 1/16/2018 at Unknown time  Yes Yes   Sig: Take 1 Tab by mouth daily. nicotinic acid (NIACIN) 500 mg tablet 1/16/2018 at Unknown time  Yes Yes   Sig: Take 500 mg by mouth Daily (before breakfast). simvastatin (ZOCOR) 40 mg tablet 1/22/2018 at Unknown time  No Yes   Sig: TAKE 1 TAB BY MOUTH NIGHTLY FOR 30 DAYS. Facility-Administered Medications: None       The patient was seen and examined in the endoscopy suite. The airway was assessed and docuemented. The problem list and medications were reviewed. Chief complaint, history of present illness, and review of systems and Past medical History are positive for: HTN and colon cancer screening. The heart, lungs and mental status were satisfactory for the administration of sedation and for the procedure. I discussed with the patient the objectives, risks, consequences and alternatives to the procedure.      Plan: Endoscopic procedure with sedation     Luz Vigil MD   1/23/2018  9:09 AM

## 2018-01-23 NOTE — IP AVS SNAPSHOT
[address] [facility] [address]w [address] 
[phone] Patient: Hilda Marquis MRN: [id number] QWH:[id number] About your hospitalization You were admitted on:  January 23, 2018 You last received care in the:  [address] [place] [address] ENDOSCOPY You were discharged on:  January 23, 2018 Why you were hospitalized Your primary diagnosis was:  Not on File Follow-up Information None Your Scheduled Appointments Friday February 09, 2018 11:45 AM EST  
ROUTINE CARE with Jericho Tidwell MD  
[facility] [address] [facility]  
[phone] Discharge Orders None A check abdiel indicates which time of day the medication should be taken. My Medications CONTINUE taking these medications Instructions Each Dose to Equal  
 Morning Noon Evening Bedtime * aspirin delayed-release 81 mg tablet Your last dose was: Your next dose is:    
   
   
 take 81 mg by mouth daily. 81 mg  
    
   
   
   
  
 * aspirin 325 mg tablet Commonly known as:  ASPIRIN Your last dose was: Your next dose is: Take 1 Tab by mouth two (2) times a day. 325 mg  
    
   
   
   
  
 b complex vitamins tablet Your last dose was: Your next dose is: Take 1 Tab by mouth daily. 1 Tab  
    
   
   
   
  
 gabapentin 300 mg capsule Commonly known as:  NEURONTIN Your last dose was: Your next dose is: TAKE ONE CAPSULE BY MOUTH 3 TIMES A DAY FOR 10 DAYS. glucosam-chond-msm-boron-hyal 940-94.2-731-4 mg Tab Your last dose was: Your next dose is: Take 1 Tab by mouth daily. 1 Tab  
    
   
   
   
  
 hydroCHLOROthiazide 25 mg tablet Commonly known as:  HYDRODIURIL Your last dose was: Your next dose is: Take 1 Tab by mouth daily for 30 days. 25 mg  
    
   
   
   
  
 ibuprofen 800 mg tablet Commonly known as:  MOTRIN Your last dose was: Your next dose is: Take 800 mg by mouth every six (6) hours as needed for Pain. Indications: OSTEOARTHRITIS  
 800 mg  
    
   
   
   
  
 melatonin Tab tablet Your last dose was: Your next dose is: Take 5 mg by mouth nightly. Indications: 2 tabltes 5 mg  
    
   
   
   
  
 multivitamin tablet Commonly known as:  ONE A DAY Your last dose was: Your next dose is: Take 1 Tab by mouth daily. 1 Tab  
    
   
   
   
  
 nicotinic acid 500 mg tablet Commonly known as:  NIACIN Your last dose was: Your next dose is: Take 500 mg by mouth Daily (before breakfast). 500 mg  
    
   
   
   
  
 OMEGA 3 PO Your last dose was: Your next dose is: Take 1 Cap by mouth daily. 1 Cap OTHER Your last dose was: Your next dose is: Juice plus for vegetable and fruit supplementation  
     
   
   
   
  
 simvastatin 40 mg tablet Commonly known as:  ZOCOR Your last dose was: Your next dose is: TAKE 1 TAB BY MOUTH NIGHTLY FOR 30 DAYS. * Notice: This list has 2 medication(s) that are the same as other medications prescribed for you. Read the directions carefully, and ask your doctor or other care provider to review them with you. Discharge Instructions [employer] [id number] 
1950 COLON DISCHARGE INSTRUCTIONS Discomfort: 
Redness at IV site- apply warm compress to area; if redness or soreness persist- contact your physician There may be a slight amount of blood passed from the rectum Gaseous discomfort- walking, belching will help relieve any discomfort You may not operate a vehicle for 12 hours You may not engage in an occupation involving machinery or appliances for rest of today You may not drink alcoholic beverages for at least 12 hours Avoid making any critical decisions for at least 24 hour DIET: 
 High fiber diet.  however -  remember your colon is empty and a heavy meal will produce gas. Avoid these foods:  vegetables, fried / greasy foods, carbonated drinks for today. MEDICATIONS: 
  
 Regarding Aspirin or Nonsteroidal medications, please see below. ACTIVITY: 
You may resume your normal daily activities it is recommended that you spend the remainder of the day resting -  avoid any strenuous activity. CALL M.D. ANY SIGN OF: Increasing pain, nausea, vomiting Abdominal distension (swelling) New increased bleeding (oral or rectal) Fever (chills) Pain in chest area Bloody discharge from nose or mouth Shortness of breath You may   take any Advil, Aspirin, Ibuprofen, Motrin, Aleve, or  Tylenol as needed for pain. Follow-up Instructions: 
 Call Dr. Schmidt Pulse office if you develop severe pain or bloody stools Telephone #  [phone] DISCHARGE SUMMARY from Nurse The following personal items collected during your admission are returned to you:  
Dental Appliance:   
Vision:   
Hearing Aid:   
Jewelry:   
Clothing:   
Other Valuables:   
Valuables sent to safe:   
 
 
 
 
  
  
  
Introducing [facility] HE[facility]! Dear Tia Angry: Thank you for requesting a [employer] account. Our records indicate that you already have an active [employer] account. You can access your account anytime at [email or web address] Did you know that you can access your hospital and ER discharge instructions at any time in [employer]? You can also review all of your test results from your hospital stay or ER visit. Additional Information If you have questions, please visit the Frequently Asked Questions section of the [employer] website at [email or web address] Remember, MyChart is NOT to be used for urgent needs. For medical emergencies, dial 911. Now available from your iPhone and Android! Providers Seen During Your Hospitalization Provider Specialty Primary office phone Laurent Dotson MD Gastroenterology [phone] Your Primary Care Physician (PCP) Primary Care Physician Office Phone Office Fax [place], [address] [phone] You are allergic to the following No active allergies Recent Documentation OB Status Smoking Status Postmenopausal Never Smoker Emergency Contacts Name Discharge Info Relation Home Work Mobile Leta Huston/Marco DISCHARGE CAREGIVER [3] Sister [23] [phone] Kyle Wilkinson CAREGIVER [3] Friend [5]   [phone] Patient Belongings The following personal items are in your possession at time of discharge: 
                             
 
  
  
 Please provide this summary of care documentation to your next provider. Signatures-by signing, you are acknowledging that this After Visit Summary has been reviewed with you and you have received a copy. Patient Signature:  ____________________________________________________________ Date:  ____________________________________________________________  
  
Cris Bloom Provider Signature:  ____________________________________________________________ Date:  ____________________________________________________________

## 2018-01-23 NOTE — PROCEDURES
[address] [facility], [address] [facility]                 Colonoscopy Procedure Note    Indications:   See Preoperative Diagnosis above  Referring Physician: Shania Ann MD  Anesthesia/Sedation: MAC anesthesia Propofol  Endoscopist:  Dr. Jennifer Escalera  Assistant:  Endoscopy Technician-1: Facundo Yo  Endoscopy RN-1: Chris Baker RN  Preoperative diagnosis: SCREENING  Postoperative diagnosis: Diverticulosis    Procedure in Detail:  Informed consent was obtained for the procedure, including sedation. Risks of perforation, hemorrhage, adverse drug reaction, and aspiration were discussed. The patient was placed in the left lateral decubitus position. Based on the pre-procedure assessment, including review of the patient's medical history, medications, allergies, and review of systems, she had been deemed to be an appropriate candidate for moderate sedation; she was therefore sedated with the medications listed above. The patient was monitored continuously with ECG tracing, pulse oximetry, blood pressure monitoring, and direct observations. A rectal examination was performed. The [id number] was inserted into the rectum and advanced under direct vision to the cecum, which was identified by the ileocecal valve and appendiceal orifice. The quality of the colonic preparation was fair. A careful inspection was made as the colonoscope was withdrawn, including a retroflexed view of the rectum; findings and interventions are described below. Appropriate photodocumentation was obtained. Findings:  Rectum: normal  Sigmoid: mild diverticulosis; Descending Colon: mild diverticulosis;  Transverse Colon: mild diverticulosis; Ascending Colon: normal  Cecum: normal    Specimens:    none    EBL: None    Complications: None; patient tolerated the procedure well.     Recommendations:     - For colon cancer screening in this average-risk patient, colonoscopy may be repeated in 10 years. - High fiber diet.     Signed By: Jackie Garcia MD                        January 23, 2018

## 2018-01-23 NOTE — ROUTINE PROCESS
Elva Dick  1950  [id number]    Situation:  Verbal report received from: RN Gay  Procedure: Procedure(s):  COLONOSCOPY    Background:    Preoperative diagnosis: SCREENING  Postoperative diagnosis: Diverticulosis    :  Dr. Angela Berger  Assistant(s): Endoscopy Technician-1: Jamison Bartlett  Endoscopy RN-1: Nayeli Coy RN    Specimens: * No specimens in log *  H. Pylori  no    Assessment:  Intra-procedure medications     Anesthesia gave intra-procedure sedation and medications, see anesthesia flow sheet yes    Intravenous fluids:   500 NS @ KVO     Vital signs stable yes    Abdominal assessment: round and soft yes    Recommendation:  Discharge patient per MD order yes.   Return to floor no  Family or Friend : friend  Permission to share finding with family or friend yes

## 2018-01-23 NOTE — DISCHARGE INSTRUCTIONS
Tutu Bolivar  [id number]  1950    COLON DISCHARGE INSTRUCTIONS  Discomfort:  Redness at IV site- apply warm compress to area; if redness or soreness persist- contact your physician  There may be a slight amount of blood passed from the rectum  Gaseous discomfort- walking, belching will help relieve any discomfort  You may not operate a vehicle for 12 hours  You may not engage in an occupation involving machinery or appliances for rest of today  You may not drink alcoholic beverages for at least 12 hours  Avoid making any critical decisions for at least 24 hour  DIET:   High fiber diet. - however -  remember your colon is empty and a heavy meal will produce gas. Avoid these foods:  vegetables, fried / greasy foods, carbonated drinks for today. MEDICATIONS:      Regarding Aspirin or Nonsteroidal medications, please see below. ACTIVITY:  You may resume your normal daily activities it is recommended that you spend the remainder of the day resting -  avoid any strenuous activity. CALL M.D. ANY SIGN OF:  Increasing pain, nausea, vomiting  Abdominal distension (swelling)  New increased bleeding (oral or rectal)  Fever (chills)  Pain in chest area  Bloody discharge from nose or mouth  Shortness of breath    You may   take any Advil, Aspirin, Ibuprofen, Motrin, Aleve, or  Tylenol as needed for pain.       Follow-up Instructions:   Call Dr. Gladstone Sacks office if you develop severe pain or bloody stools  Telephone #  [phone]      Luisana Doran from Nurse    The following personal items collected during your admission are returned to you:   Dental Appliance:    Vision:    Hearing Aid:    Jewelry:    Clothing:    Other Valuables:    Valuables sent to safe:

## 2018-01-23 NOTE — ANESTHESIA PREPROCEDURE EVALUATION
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

## 2018-01-24 ENCOUNTER — PATIENT OUTREACH (OUTPATIENT)
Dept: OTHER | Age: 68
End: 2018-01-24

## 2018-01-24 NOTE — PROGRESS NOTES
Transition Of Care Note    Patient discharged from [facility] ED for Colonocopy. Medical History:     Past Medical History:   Diagnosis Date    Arthritis     L knee OA both knees per pt    Hypercholesterolemia     Hypertension     Ill-defined condition     obese  BMI= 30.7 on 2017    Liver disease     H/O HEPATITIS A  contracted while working in [facility] Other ill-defined conditions(799.89) [address] contacted the patient by telephone to perform post 18(OP) discharge assessment. Verified  and zip code with patient as identifiers. Provided introduction to self, and explanation of the Nurse Care Manager role. * Patient returned call. Reports that she is doing well. No Post-Op issues from Colonoscopy. Preoperative diagnosis: SCREENING  Postoperative diagnosis: Diverticulosis  Recommendations:     - For colon cancer screening in this average-risk patient, colonoscopy may be repeated in 10 years. - High fiber diet. Medication:   Performed medication reconciliation with patient, and patient verbalizes understanding of administration of home medications. There were no barriers to obtaining medications identified at this time. Support system:  patient and sister    Discharge Instructions :  Reviewed discharge instructions with patient. Patient verbalizes understanding of discharge instructions and follow-up care. *Be sure to make and go to all appointments, and call your doctor if you are having problems. It's also a good idea to know your test results and keep a list of the medicines you take. · You can increase how much fiber you get if you eat more of certain foods. These foods include:  ¨ Whole-grain breads and cereals. ¨ Fruits, such as pears, apples, and peaches. Eat the skins, peels, and seeds, if you can. ¨ Vegetables, such as broccoli, cabbage, spinach, carrots, asparagus, and squash. ¨ Starchy vegetables. These include potatoes with skins, kidney beans, and lima beans. · Take a fiber supplement every day if your doctor recommends it. Examples are Benefiber, Citrucel, FiberCon, and Metamucil. Ask your doctor how much to take. · Drink plenty of fluids, enough so that your urine is light yellow or clear like water. If you have kidney, heart, or liver disease and have to limit fluids, talk with your doctor before you increase the amount of fluids you drink. · Get some exercise every day. Exercise helps stool move through the colon. It also helps prevent constipation. Red Flags:  Call your doctor now or seek immediate medical care if:  ? · You have belly pain. ? · You pass maroon or very bloody stools. ? · You have a fever. ? · You have nausea and vomiting. ? · You have unusual changes in your bowel movements or abdominal swelling. ? · You have burning pain when you urinate. ? · You have abnormal vaginal discharge. ? · You have shoulder pain. ? · You have cramping pain that does not get better when you have a bowel movement or pass gas. ? · You pass gas or stool from your urethra while urinating. Advance Care Planning:   Patient was offered the opportunity to discuss advance care planning:  no     Does patient have an Advance Directive:  no   If no, did you provide information on Caring Connections?  no     PCP/Specialist follow up: Patient scheduled to follow up with Sarahi Demarco MD on 2/9/18 @ 11:45a. Reviewed red flags with patient, and patient verbalizes understanding. Patient given an opportunity to ask questions. No other clinical/social/functional needs noted. The patient agrees to contact the PCP office for questions related to their healthcare. The patient expressed thanks, offered no additional questions and ended the call. Informed that CM would follow up in a few weeks. Contact information given.

## 2018-02-09 ENCOUNTER — OFFICE VISIT (OUTPATIENT)
Dept: INTERNAL MEDICINE CLINIC | Age: 68
End: 2018-02-09

## 2018-02-09 VITALS
OXYGEN SATURATION: 99 % | WEIGHT: 189 LBS | BODY MASS INDEX: 30.37 KG/M2 | HEIGHT: 66 IN | SYSTOLIC BLOOD PRESSURE: 153 MMHG | HEART RATE: 65 BPM | RESPIRATION RATE: 14 BRPM | TEMPERATURE: 97.8 F | DIASTOLIC BLOOD PRESSURE: 82 MMHG

## 2018-02-09 DIAGNOSIS — I10 ESSENTIAL HYPERTENSION: ICD-10-CM

## 2018-02-09 DIAGNOSIS — E78.00 HYPERCHOLESTEREMIA: Primary | ICD-10-CM

## 2018-02-09 DIAGNOSIS — B02.9 HERPES ZOSTER WITHOUT COMPLICATION: ICD-10-CM

## 2018-02-09 DIAGNOSIS — M25.561 CHRONIC PAIN OF BOTH KNEES: ICD-10-CM

## 2018-02-09 DIAGNOSIS — G89.29 CHRONIC PAIN OF BOTH KNEES: ICD-10-CM

## 2018-02-09 DIAGNOSIS — M25.562 CHRONIC PAIN OF BOTH KNEES: ICD-10-CM

## 2018-02-09 RX ORDER — DICLOFENAC SODIUM 75 MG/1
TABLET, DELAYED RELEASE ORAL
COMMUNITY
End: 2018-06-22

## 2018-02-09 RX ORDER — DICLOFENAC SODIUM 10 MG/G
GEL TOPICAL 4 TIMES DAILY
COMMUNITY
End: 2018-07-04 | Stop reason: CLARIF

## 2018-02-09 NOTE — PROGRESS NOTES
HISTORY OF PRESENT ILLNESS  René Lockwood is a 77 y.o. female. HPI   Patient reports her right eye has been blurry. She states he followed up with Dr. Monse Banerjee and she has some scarring. Patient was put on steroid drops and continues medications. Patient reports she will follow up with Dr. Ashok Scott to discuss knees. In addition, patient states she is going to watch diet and exercise to intentionally lose weight. Hypertension ROS: taking medications as instructed, no medication side effects noted, no TIA's, no chest pain on exertion, no dyspnea on exertion, no swelling of ankles. New concerns:  Patient's BP in office today is 153/82. Hypercholesteremia:  Cardiovascular risk analysis - 77 y.o. female LDL goal is under 130. ROS: taking medications as instructed, no medication side effects noted, no TIA's, no chest pain on exertion, no dyspnea on exertion, no swelling of ankles. Tolerating meds, no myalgias or other side effects noted  New concerns: Last LDL was 137 on 7/2017. Review of Systems   All other systems reviewed and are negative. Physical Exam   Constitutional: She is oriented to person, place, and time. She appears well-developed and well-nourished. HENT:   Head: Normocephalic and atraumatic. Right Ear: External ear normal.   Left Ear: External ear normal.   Nose: Nose normal.   Mouth/Throat: Oropharynx is clear and moist.   Eyes: Conjunctivae and EOM are normal.   Neck: Normal range of motion. Neck supple. Carotid bruit is not present. No thyroid mass and no thyromegaly present. Cardiovascular: Normal rate, regular rhythm, S1 normal, S2 normal, normal heart sounds and intact distal pulses. Pulmonary/Chest: Effort normal and breath sounds normal.   Abdominal: Soft. Normal appearance and bowel sounds are normal. There is no hepatosplenomegaly. There is no tenderness. Musculoskeletal: Normal range of motion. Neurological: She is alert and oriented to person, place, and time.  She has normal strength. No cranial nerve deficit or sensory deficit. Coordination normal.   Skin: Skin is warm, dry and intact. No abrasion and no rash noted. Psychiatric: She has a normal mood and affect. Her behavior is normal. Judgment and thought content normal.   Nursing note and vitals reviewed. ASSESSMENT and PLAN  Diagnoses and all orders for this visit:    1. Hypercholesteremia  Stable, patient tolerating meds, no myalgias. I do not recommend any change in medications.  -     LIPID PANEL  -     METABOLIC PANEL, COMPREHENSIVE    2. Essential hypertension  /82 today in office. I do not recommend any change in medications, will continue to monitor.   -     METABOLIC PANEL, COMPREHENSIVE    3. Herpes zoster without complication  Patient will continue to follow up with Dr. Mana Saldana. Continue current medications. Eye drops and monitoring of her eye    4. Chronic pain of both knees  Patient will follow up with Dr. Rula Dias. lab results and schedule of future lab studies reviewed with patient  reviewed diet, exercise and weight control    Written by Milton Kee, as dictated by Shaheed Candelaria MD.     Current diagnosis and concerns discussed with pt at length. Understands risks and benefits or current treatment plan and medications and accepts the treatment and medication with any possible risks. Pt asks appropriate questions which were answered. Pt instructed to call with any concerns or problems.

## 2018-02-10 DIAGNOSIS — R94.5 ABNORMAL LIVER FUNCTION: Primary | ICD-10-CM

## 2018-02-10 LAB
ALBUMIN SERPL-MCNC: 4.7 G/DL (ref 3.5–4.8)
ALBUMIN/GLOB SERPL: 2 {RATIO} (ref 1.2–2.2)
ALP SERPL-CCNC: 62 IU/L (ref 39–117)
ALT SERPL-CCNC: 65 IU/L (ref 0–32)
AST SERPL-CCNC: 51 IU/L (ref 0–40)
BILIRUB SERPL-MCNC: 0.4 MG/DL (ref 0–1.2)
BUN SERPL-MCNC: 18 MG/DL (ref 8–27)
BUN/CREAT SERPL: 25 (ref 12–28)
CALCIUM SERPL-MCNC: 10.7 MG/DL (ref 8.7–10.3)
CHLORIDE SERPL-SCNC: 98 MMOL/L (ref 96–106)
CHOLEST SERPL-MCNC: 234 MG/DL (ref 100–199)
CO2 SERPL-SCNC: 25 MMOL/L (ref 18–29)
CREAT SERPL-MCNC: 0.73 MG/DL (ref 0.57–1)
GFR SERPLBLD CREATININE-BSD FMLA CKD-EPI: 84 ML/MIN/1.73
GFR SERPLBLD CREATININE-BSD FMLA CKD-EPI: 96 ML/MIN/1.73
GLOBULIN SER CALC-MCNC: 2.4 G/DL (ref 1.5–4.5)
GLUCOSE SERPL-MCNC: 86 MG/DL (ref 65–99)
HDLC SERPL-MCNC: 75 MG/DL
INTERPRETATION, 910389: NORMAL
LDLC SERPL CALC-MCNC: 132 MG/DL (ref 0–99)
POTASSIUM SERPL-SCNC: 3.9 MMOL/L (ref 3.5–5.2)
PROT SERPL-MCNC: 7.1 G/DL (ref 6–8.5)
SODIUM SERPL-SCNC: 139 MMOL/L (ref 134–144)
TRIGL SERPL-MCNC: 133 MG/DL (ref 0–149)
VLDLC SERPL CALC-MCNC: 27 MG/DL (ref 5–40)

## 2018-02-11 DIAGNOSIS — I10 ESSENTIAL HYPERTENSION: ICD-10-CM

## 2018-02-11 RX ORDER — HYDROCHLOROTHIAZIDE 25 MG/1
TABLET ORAL
Qty: 90 TAB | Refills: 1 | Status: SHIPPED | OUTPATIENT
Start: 2018-02-11 | End: 2018-08-15 | Stop reason: SDUPTHER

## 2018-02-28 ENCOUNTER — PATIENT OUTREACH (OUTPATIENT)
Dept: OTHER | Age: 68
End: 2018-02-28

## 2018-02-28 NOTE — PROGRESS NOTES
Resolving current episode Transitions of care complete. No further ED/UC or hospital admissions within 30 days post discharge. Patient attended follow-up appointments as directed. No outreach from patient to [address] [place] [address].

## 2018-04-10 DIAGNOSIS — E78.00 HYPERCHOLESTEREMIA: ICD-10-CM

## 2018-04-12 ENCOUNTER — HOSPITAL ENCOUNTER (OUTPATIENT)
Dept: PHYSICAL THERAPY | Age: 68
Discharge: HOME | End: 2018-04-12
Payer: COMMERCIAL

## 2018-04-12 PROCEDURE — 97110 THERAPEUTIC EXERCISES: CPT | Performed by: PHYSICAL THERAPIST

## 2018-04-12 PROCEDURE — 97161 PT EVAL LOW COMPLEX 20 MIN: CPT | Performed by: PHYSICAL THERAPIST

## 2018-04-12 NOTE — PROGRESS NOTES
[facility]  [address]  [place]  Phone: [phone]  Fax: [phone]    Plan of Care/Statement of Necessity for Physical Therapy Services  2-15    Patient name: Chandrakant Husain  : 1950  Provider#: [id number]  Referral source: Carolina Fenton MD      Medical/Treatment Diagnosis: There are no admission diagnoses documented for this encounter. Prior Hospitalization: see medical history     Comorbidities: see eval.  Prior Level of Function: see eval.   Medications: Verified on Patient Summary List  Start of Care: see eval.      Onset Date: see eval.     The Plan of Care and following information is based on the information from the initial evaluation. Assessment/ key information: Pt is a 78 yo female with B/L knee OA per her report and is scheduled for B/L TKA 2018. Pt presents with decreased strength, decreased ROM, increased pain, increased TTP, gait deviations and pain with walking her dog and overall any weight bearing activity.       Evaluation Complexity History MEDIUM  Complexity : 1-2 comorbidities / personal factors will impact the outcome/ POC ; Examination MEDIUM Complexity : 3 Standardized tests and measures addressing body structure, function, activity limitation and / or participation in recreation  ;Presentation LOW Complexity : Stable, uncomplicated  ;Clinical Decision Making MEDIUM Complexity : FOTO score of 26-74  Overall Complexity Rating: LOW      Treatment Plan may include any combination of the following: Therapeutic exercise, Therapeutic activities, Neuromuscular re-education, Physical agent/modality, Gait/balance training, Manual therapy and Patient education   Patient / Family readiness to learn indicated by: asking questions, trying to perform skills and interest  Persons(s) to be included in education: patient (P)  Barriers to Learning/Limitations: None  Patient Goal (s): \"see eval  Patient Self Reported Health Status: good  Rehabilitation Potential: good    Short Term Goals: To be accomplished in 2-3 weeks:   1) Pt independent in HEP from day 1   2) Pt will report she has stopped sleeping with pillow under her knee to avoid joint contracture/further limitations in knee extension  3) Pt will be able to walk 50 ft or more in clinic with [facility] with correct sequencing to decrease left knee pain    Long Term Goals: To be accomplished in 4-6 weeks:   1) Pt independent in final HEP. 2) Pt will have good understanding of an aquatic exercise program for strengthening, ROM and flexibility    3) Pt will report 25% improvement in symptoms while walking her dog   4) Pt will report mild TTP medial left distal HS versus moderate at evaluation to decrease soft tissue sensitivity prior to joint surgery          Frequency / Duration: Patient to be seen 1-2 times per week for 4-6 weeks. Patient/ Caregiver education and instruction: self care and exercises    [x]  Plan of care has been reviewed with LEWIS Nelson (GP)      Gab Chapa, PT DPT,OCS 4/12/2018 11:03 AM    ________________________________________________________________________    I certify that the above Therapy Services are being furnished while the patient is under my care. I agree with the treatment plan and certify that this therapy is necessary.     [de-identified] Signature:____________________  Date:____________Time: _________    ___

## 2018-04-12 NOTE — PROGRESS NOTES
[facility]  [address], [place], [address]  Phone: [phone]  Fax: [phone]      PT INITIAL EVALUATION NOTE - [facility] 2-15    Patient Name: Chandrakant Husain  Date:2018  : 1950  [x]  Patient  Verified  Payor: Sheri [place] / Plan: Carlos Her / Product Type: PPO /    In time:1110  Out time:1205  Total Treatment Time (min): 55  Total Timed Codes (min): 25  1:1 Treatment Time ( [address] only): 55   Visit #: 1     Treatment Area: There are no admission diagnoses documented for this encounter. SUBJECTIVE  Any medication changes, allergies to medications, adverse drug reactions, diagnosis change, or new procedure performed?: [] No    [x] Yes (see summary sheet for update)    Current symptoms/chief complaint:  B/L knee pain, scheduled for TKA . Meniscus repair [id number]. It was better for awhile (after surgery). A year or two after had an acute flare up (left) was very intense, acute. Then had an injection (thinks cortisone). Now, she has been increasing frequency injections. Dr. Christopher Hurt has recommended TKA  Pt reports she has B/L TKA 2018. She has resting pain, more pain when walking her dog (can't go as far or as quickly) bothers her at work. [facility] 2017. She has a cane and a walker at home from [facility]. Date of onset/injury: Worse in past year. 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 Lives at home alone but sister will come (retired PT) to help.     She started swimming about 1 month ago to try to lose weight and strengthen. She lost 6-7 lbs. She joined [employer] in [place]. She does 30-40 minutes 3x/week. She swims different strokes, walking and jogging. Prior level of function: Able to walk her dog with less knee pain. Patient goal(s): \"strengthen muscles, flexibility\"  \"Advice for what I should be doing at home. \"      Objective:    Posture/Observations: Pt stands with L>R knee flexion, femur ADD, right genu valgus, L genu varus. Gait:  Pt ambulates without an AD, right trunk lean in right stance, decreased R arm swing, decreased left hip extension, lacks TKE at heel strike B/L         ROM:  Knee Left 7 deg (discomfort) to 130 deg \"tight\"   Right:  8 deg (less discomfort) to 133 deg (discomfort behind right knee - left is worse)    Strength:  L hip flexion 4- , R 5  L knee ext 4 p! Mild, R 5  Ham 5/5 in seated position. Bridge: pt able to perform a bridge. Palpation:  Tenderness to palpation: R distal ITB, lateral joint line. L medial distal hamstring, ITB. Joint Mobility:  PF restricted mild left, right WNL          Other tests/ comments:    Outcome Measure: Patient presents with a FOTO intake summary score of in chart. OBJECTIVE    25 min Therapeutic Exercise:  [x] See flow sheet : quad sets with towel, seated HS stretch, bridges. Review of aquatic exercises : recommended FW, retro and lateral stepping, bicycle motion using aqua jogger or noodle in deep water or pool run    Pt education avoid sleeping with pillow under her knee. Rationale: increase ROM, increase strength and improve coordination to improve the patients ability to walk with less pain and improve strength prior to surgery     ice to go as pt needing to get back to work.            With   [] TE   [] TA   [] neuro   [] other: Patient Education: [x] Review HEP    [] Progressed/Changed HEP based on:   [] positioning   [] body mechanics   [] transfers [] heat/ice application    [] other:        Pain Level (0-10 scale) post treatment: 5      Assessment: See POC    Plan: See POC    Dakotah Shepard PT, DPT, OCS    4/12/2018    11:03 AM

## 2018-04-13 RX ORDER — SIMVASTATIN 40 MG/1
TABLET, FILM COATED ORAL
Qty: 90 TAB | Refills: 1 | OUTPATIENT
Start: 2018-04-13

## 2018-04-17 ENCOUNTER — TELEPHONE (OUTPATIENT)
Dept: INTERNAL MEDICINE CLINIC | Age: 68
End: 2018-04-17

## 2018-04-17 NOTE — TELEPHONE ENCOUNTER
----- Message from Hyun Mcneil sent at 4/17/2018  1:01 PM EDT -----  Regarding: /Telephone  Pt is calling to advised  that [facility] is requesting the lab requisition (f)[phone]. Best contact number is [phone].

## 2018-04-18 ENCOUNTER — HOSPITAL ENCOUNTER (OUTPATIENT)
Dept: PHYSICAL THERAPY | Age: 68
Discharge: HOME | End: 2018-04-18
Payer: COMMERCIAL

## 2018-04-18 LAB
ALBUMIN SERPL-MCNC: 4.6 G/DL (ref 3.5–4.8)
ALP SERPL-CCNC: 65 IU/L (ref 39–117)
ALT SERPL-CCNC: 34 IU/L (ref 0–32)
AST SERPL-CCNC: 31 IU/L (ref 0–40)
BILIRUB DIRECT SERPL-MCNC: 0.08 MG/DL (ref 0–0.4)
BILIRUB SERPL-MCNC: 0.3 MG/DL (ref 0–1.2)
PROT SERPL-MCNC: 7 G/DL (ref 6–8.5)

## 2018-04-18 PROCEDURE — 97140 MANUAL THERAPY 1/> REGIONS: CPT | Performed by: PHYSICAL THERAPIST

## 2018-04-18 PROCEDURE — 97110 THERAPEUTIC EXERCISES: CPT | Performed by: PHYSICAL THERAPIST

## 2018-04-18 NOTE — PROGRESS NOTES
PT DAILY TREATMENT NOTE 2-15    Patient Name: Adan Jeffrey  FPLY:  : 1950  [x]  Patient  Verified  Payor: Joyce Zheng / Plan: Mariah Banuelos / Product Type: PPO /    In time: 11:40 AM  Out time: 12:50 PM  Total Treatment Time (min): 70 (60 timed)  Visit #: 2     Treatment Area: Left knee pain [M25.562]  Right knee pain [M25.561]    SUBJECTIVE  Pain Level (0-10 scale): 4/10  Any medication changes, allergies to medications, adverse drug reactions, diagnosis change, or new procedure performed?: [x] No    [] Yes (see summary sheet for update)  Subjective functional status/changes:   [] No changes reported  Pt states she visited her sister/sister's family in [place] over the weekend so she was unable to do her HEP as often as she would like.  She has been to the pool where she has been sidestepping, walking forward/backward and also taking advantage of the hot tub after she works out    OBJECTIVE    Modality rationale: decrease edema, decrease inflammation and decrease pain to improve the patients ability to perform daily chores with dec'd pain   Min Type Additional Details    [] Estim: []Att   []Unatt        []TENS instruct                  []IFC  []Premod   []NMES                     []Other:  []w/US   []w/ice   []w/heat  Position:  Location:    []  Traction: [] Cervical       []Lumbar                       [] Prone          []Supine                       []Intermittent   []Continuous Lbs:  [] before manual  [] after manual  []w/heat    []  Ultrasound: []Continuous   [] Pulsed at:                            []1MHz   []3MHz Location:  W/cm2:    []  Paraffin         Location:  []w/heat   10 [x]  Ice     []  Heat  []  Ice massage Position: supine, LE's elevated  Location: b/l knees    []  Laser  []  Other: Position:  Location:    []  Vasopneumatic Device Pressure:       [] lo [] med [] hi   Temperature:    [x] Skin assessment post-treatment:  [x]intact []redness- no adverse reaction []redness  adverse reaction:     50 min Therapeutic Exercise:  [x] See flow sheet : Reviewed HEP; adder per flow sheet   Rationale: increase ROM and increase strength to improve the patients ability to perform household chores, activities of daily living, walk her dog with dec'd symptoms    10 min Manual Therapy:  Med/lat, inf/sup patellar mobs b/l   Rationale: decrease pain and increase tissue extensibility  to improve the patients ability to exercise, walk with dec'd pain     min Gait Training:  ___ feet with ___ device on level surfaces with ___ level of assist   Rationale: With   [] TE   [] TA   [] neuro   [] other: Patient Education: [x] Review HEP    [] Progressed/Changed HEP based on:   [] positioning   [] body mechanics   [] transfers   [] heat/ice application    [] other:      Other Objective/Functional Measures: n/a     Pain Level (0-10 scale) post treatment: \"cold, a little sore\"    ASSESSMENT/Changes in Function:   Pt tolerated therapy session well. Advised pt to perform HS stretch on step vs seated due to pt reports of only feeling stretch through her calf while seated. Added clamshells to HEP. Cues for knee extension/quad activation during TKE vs hip extension    Patient will continue to benefit from skilled PT services to modify and progress therapeutic interventions, address functional mobility deficits, address ROM deficits, address strength deficits, analyze and address soft tissue restrictions, analyze and cue movement patterns, analyze and modify body mechanics/ergonomics and assess and modify postural abnormalities to attain remaining goals.      []  See Plan of Care  []  See progress note/recertification  []  See Discharge Summary         Progress towards goals / Updated goals:  nt    PLAN  []  Upgrade activities as tolerated     [x]  Continue plan of care  []  Update interventions per flow sheet       []  Discharge due to:_  []  Other:_      Asaf Santiago, PT 4/18/2018  12:01 PM

## 2018-04-20 ENCOUNTER — APPOINTMENT (OUTPATIENT)
Dept: PHYSICAL THERAPY | Age: 68
End: 2018-04-20
Payer: COMMERCIAL

## 2018-04-26 ENCOUNTER — HOSPITAL ENCOUNTER (OUTPATIENT)
Dept: PHYSICAL THERAPY | Age: 68
Discharge: HOME | End: 2018-04-26
Payer: COMMERCIAL

## 2018-04-26 PROCEDURE — 97140 MANUAL THERAPY 1/> REGIONS: CPT | Performed by: PHYSICAL THERAPIST

## 2018-04-26 PROCEDURE — 97110 THERAPEUTIC EXERCISES: CPT | Performed by: PHYSICAL THERAPIST

## 2018-04-26 NOTE — PROGRESS NOTES
PT DAILY TREATMENT NOTE 2-15    Patient Name: Suyapa Vázquez  Date:2018  : 1950  [x]  Patient  Verified  Payor: Dae Cooney / Plan: Merlene Durbin / Product Type: PPO /    In time: 530 Out time:650 PM  Total Treatment Time (min): 80  Timed 65  Visit #: 4    Treatment Area: Left knee pain [M25.562]  Right knee pain [M25.561]    SUBJECTIVE    Pain Level (0-10 scale): 3-4/10. Pt reports knee pain isn't as bad today as she was sitting a lot at work. She is still going to the pool and performing HEP \"it's actually been better\" but still sometimes with stairs she knows she should go down with the \"bad\" leg but can't decide which one is bad because both hurt. Any medication changes, allergies to medications, adverse drug reactions, diagnosis change, or new procedure performed?: [x] No    [] Yes (see summary sheet for update)  Subjective functional status/changes:   [] No changes reported  See above.     OBJECTIVE    Modality rationale: decrease edema, decrease inflammation and decrease pain to improve the patients ability to perform daily chores with dec'd pain   Min Type Additional Details    [] Estim: []Att   []Unatt        []TENS instruct                  []IFC  []Premod   []NMES                     []Other:  []w/US   []w/ice   []w/heat  Position:  Location:    []  Traction: [] Cervical       []Lumbar                       [] Prone          []Supine                       []Intermittent   []Continuous Lbs:  [] before manual  [] after manual  []w/heat    []  Ultrasound: []Continuous   [] Pulsed at:                            []1MHz   []3MHz Location:  W/cm2:    []  Paraffin         Location:  []w/heat   10 [x]  Ice     []  Heat  []  Ice massage Position: supine, LE's elevated  Location: b/l knees    []  Laser  []  Other: Position:  Location:    []  Vasopneumatic Device Pressure:       [] lo [] med [] hi   Temperature:    [x] Skin assessment post-treatment:  [x]intact []redness- no adverse reaction    []redness  adverse reaction:     55 min Therapeutic Exercise:  [x] See flow sheet : added S/L hip abduction. Rationale: increase ROM and increase strength to improve the patients ability to perform household chores, activities of daily living, walk her dog with dec'd symptoms    10 min Manual Therapy:  Use of roller on B/L ITB, L increased tenderness. Rationale: decrease pain and increase tissue extensibility  to improve the patients ability to exercise, walk with dec'd pain     min Gait Training:  ___ feet with ___ device on level surfaces with ___ level of assist   Rationale: With   [] TE   [] TA   [] neuro   [] other: Patient Education: [x] Review HEP    [] Progressed/Changed HEP based on:   [] positioning   [] body mechanics   [] transfers   [] heat/ice application    [] other:      Other Objective/Functional Measures: TTP L > R ITB     Pain Level (0-10 scale) post treatment:  3    ASSESSMENT/Changes in Function:   Pt did well in session, needing cues with bridges to avoid hamstring cramping and with correct form with standing hamstring stretch as well as S/L clams. Pt noting \"it's a lot harder here\" with S/L clams as she was likely compensating with lumbar rotation at home. Patient will continue to benefit from skilled PT services to modify and progress therapeutic interventions, address functional mobility deficits, address ROM deficits, address strength deficits, analyze and address soft tissue restrictions, analyze and cue movement patterns, analyze and modify body mechanics/ergonomics and assess and modify postural abnormalities to attain remaining goals.      []  See Plan of Care  []  See progress note/recertification  []  See Discharge Summary         Progress towards goals / Updated goals:  nt    PLAN  [x]  Upgrade activities as tolerated     [x]  Continue plan of care  []  Update interventions per flow sheet       []  Discharge due to:_  [x]  Other:_continue with manual therapy to decrease pain, tissue extensibility, strengthening prior to planned B/L TKA .        Ioana Estimable, PT 4/26/2018  12:01 PM

## 2018-04-27 ENCOUNTER — APPOINTMENT (OUTPATIENT)
Dept: PHYSICAL THERAPY | Age: 68
End: 2018-04-27
Payer: COMMERCIAL

## 2018-05-03 ENCOUNTER — HOSPITAL ENCOUNTER (OUTPATIENT)
Dept: PHYSICAL THERAPY | Age: 68
Discharge: HOME | End: 2018-05-03
Payer: COMMERCIAL

## 2018-05-03 PROCEDURE — 97140 MANUAL THERAPY 1/> REGIONS: CPT | Performed by: PHYSICAL THERAPIST

## 2018-05-03 PROCEDURE — 97110 THERAPEUTIC EXERCISES: CPT | Performed by: PHYSICAL THERAPIST

## 2018-05-03 NOTE — PROGRESS NOTES
PT DAILY TREATMENT NOTE 2-15    Patient Name: Joyce Alejo  Date:5/3/2018  : 1950   [x]  Patient  Verified  Payor: Mihai Patiño / Plan: Pawel Smith / Product Type: PPO /    In time: 500 Out time:615 PM  Total Treatment Time (min): 75  Timed 65  Visit #: 5     Treatment Area: Left knee pain [M25.562]  Right knee pain [M25.561]    SUBJECTIVE    Pain Level (0-10 scale): 3-4/10 bilateral knee pain. Pt reports she is going to the pool 3x/week. Pt reports she has a friend who told her is would be important to strengthen    Any medication changes, allergies to medications, adverse drug reactions, diagnosis change, or new procedure performed?: [x] No    [] Yes (see summary sheet for update)  Subjective functional status/changes:   [] No changes reported  See above. OBJECTIVE    Modality rationale: decrease edema, decrease inflammation and decrease pain to improve the patients ability to perform daily chores with dec'd pain   Min Type Additional Details    [] Estim: []Att   []Unatt        []TENS instruct                  []IFC  []Premod   []NMES                     []Other:  []w/US   []w/ice   []w/heat  Position:  Location:    []  Traction: [] Cervical       []Lumbar                       [] Prone          []Supine                       []Intermittent   []Continuous Lbs:  [] before manual  [] after manual  []w/heat    []  Ultrasound: []Continuous   [] Pulsed at:                            []1MHz   []3MHz Location:  W/cm2:    []  Paraffin         Location:  []w/heat   10 [x]  Ice     []  Heat  []  Ice massage Position: supine, LE's elevated  Location: b/l knees    []  Laser  []  Other: Position:  Location:    []  Vasopneumatic Device Pressure:       [] lo [] med [] hi   Temperature:    [x] Skin assessment post-treatment:  [x]intact []redness- no adverse reaction    []redness  adverse reaction:     55 min Therapeutic Exercise:  [x] See flow sheet : increased reps.   Added updated aquatic exercise program.     Rationale: increase ROM and increase strength to improve the patients ability to perform household chores, activities of daily living, walk her dog with dec'd symptoms    10 min Manual Therapy:  Use of roller on B/L ITB. Rationale: decrease pain and increase tissue extensibility  to improve the patients ability to exercise, walk with dec'd pain     min Gait Training:  ___ feet with ___ device on level surfaces with ___ level of assist   Rationale: With   [] TE   [] TA   [] neuro   [] other: Patient Education: [x] Review HEP    [] Progressed/Changed HEP based on:   [] positioning   [] body mechanics   [] transfers   [] heat/ice application    [] other:      Other Objective/Functional Measures: TTP L > R ITB     Pain Level (0-10 scale) post treatment:  Pt reports pain is about the same 3-4/10    ASSESSMENT/Changes in Function:   Pt doing well with aquatic exercise program, gave updated HEP sheet today including land exercises but also aquatic exercises for LE and UE/core stabilization to prepare for B/L TKA. Patient will continue to benefit from skilled PT services to modify and progress therapeutic interventions, address functional mobility deficits, address ROM deficits, address strength deficits, analyze and address soft tissue restrictions, analyze and cue movement patterns, analyze and modify body mechanics/ergonomics and assess and modify postural abnormalities to attain remaining goals. []  See Plan of Care  []  See progress note/recertification  []  See Discharge Summary         Long Term Goals: To be accomplished in 4-6 weeks:                        1) Pt independent in final HEP.                          2) Pt will have good understanding of an aquatic exercise program for strengthening, ROM and flexibility                         3) Pt will report 25% improvement in symptoms while walking her dog                        4) Pt will report mild TTP medial left distal HS versus moderate at evaluation to decrease soft tissue sensitivity prior to joint surgery             PLAN  [x]  Upgrade activities as tolerated     [x]  Continue plan of care  []  Update interventions per flow sheet       []  Discharge due to:_  [x]  Other:_continue with manual therapy to decrease pain, tissue extensibility, strengthening prior to planned B/L TKA .        Chivo Wilkerson, PT 5/3/2018  12:01 PM

## 2018-05-11 ENCOUNTER — APPOINTMENT (OUTPATIENT)
Dept: PHYSICAL THERAPY | Age: 68
End: 2018-05-11
Payer: COMMERCIAL

## 2018-05-15 ENCOUNTER — HOSPITAL ENCOUNTER (OUTPATIENT)
Dept: PHYSICAL THERAPY | Age: 68
Discharge: HOME | End: 2018-05-15
Payer: COMMERCIAL

## 2018-05-15 PROCEDURE — 97110 THERAPEUTIC EXERCISES: CPT | Performed by: PHYSICAL THERAPIST

## 2018-05-15 NOTE — PROGRESS NOTES
PT DAILY TREATMENT NOTE 2-15    Patient Name: Dami Jacinto  Date:5/15/2018  : 1950   [x]  Patient  Verified  Payor: Vanessa Leggett / Plan: Betito Welch / Product Type: PPO /    In time: 525 Out time: 625 PM  Total Treatment Time (min): 60  Timed  60  Visit #: 6    Treatment Area: Left knee pain [M25.562]  Right knee pain [M25.561]    SUBJECTIVE    Pain Level (0-10 scale): 3/10 bilateral knee pain. Pt reports it is not so bad today as she has been driving a lot. Hasn't had a chance to get in here as she is really busy, has even had difficulty getting into the pool. Pt thinks she will start working from home a few weeks after surgery. Any medication changes, allergies to medications, adverse drug reactions, diagnosis change, or new procedure performed?: [x] No    [] Yes (see summary sheet for update)  Subjective functional status/changes:   [] No changes reported  See above.     OBJECTIVE    Modality rationale: decrease edema, decrease inflammation and decrease pain to improve the patients ability to perform daily chores with dec'd pain   Min Type Additional Details    [] Estim: []Att   []Unatt        []TENS instruct                  []IFC  []Premod   []NMES                     []Other:  []w/US   []w/ice   []w/heat  Position:  Location:    []  Traction: [] Cervical       []Lumbar                       [] Prone          []Supine                       []Intermittent   []Continuous Lbs:  [] before manual  [] after manual  []w/heat    []  Ultrasound: []Continuous   [] Pulsed at:                            []1MHz   []3MHz Location:  W/cm2:    []  Paraffin         Location:  []w/heat   To go [x]  Ice     []  Heat  []  Ice massage Position: supine, LE's elevated  Location: b/l knees    []  Laser  []  Other: Position:  Location:    []  Vasopneumatic Device Pressure:       [] lo [] med [] hi   Temperature:    [x] Skin assessment post-treatment:  [x]intact []redness- no adverse reaction []redness  adverse reaction:     60 min Therapeutic Exercise:  [x] See flow sheet : Review of updated aquatic exercises, discussed consideration of time off of work as needed to focus on B/L TKA as she will be spending 2 weeks in inpt rehab. Rationale: increase ROM and increase strength to improve the patients ability to perform household chores, activities of daily living, walk her dog with dec'd symptoms    - min Manual Therapy:  Use of roller on B/L ITB. Rationale: decrease pain and increase tissue extensibility  to improve the patients ability to exercise, walk with dec'd pain     min Gait Training:  ___ feet with ___ device on level surfaces with ___ level of assist   Rationale: With   [] TE   [] TA   [] neuro   [] other: Patient Education: [x] Review HEP    [] Progressed/Changed HEP based on:   [] positioning   [] body mechanics   [] transfers   [] heat/ice application    [] other:      Other Objective/Functional Measures:     Pain Level (0-10 scale) post treatment:  Pt reports pain is about the same 3-4/10    ASSESSMENT/Changes in Function:   Pt needing some cues to perform T.E. Correctly, has not been to PT in a few weeks as busy with work, also has not been in pool as much as she would like. She has tried aquatic HEP that was provided for her at last visit. Patient will continue to benefit from skilled PT services to modify and progress therapeutic interventions, address functional mobility deficits, address ROM deficits, address strength deficits, analyze and address soft tissue restrictions, analyze and cue movement patterns, analyze and modify body mechanics/ergonomics and assess and modify postural abnormalities to attain remaining goals. []  See Plan of Care  []  See progress note/recertification  []  See Discharge Summary         Long Term Goals: To be accomplished in 4-6 weeks:                        1) Pt independent in final HEP.                          2) Pt will have good understanding of an aquatic exercise program for strengthening, ROM and flexibility                         3) Pt will report 25% improvement in symptoms while walking her dog                        4) Pt will report mild TTP medial left distal HS versus moderate at evaluation to decrease soft tissue sensitivity prior to joint surgery             PLAN  [x]  Upgrade activities as tolerated     [x]  Continue plan of care  []  Update interventions per flow sheet       []  Discharge due to:_  [x]  Other:_continue with manual therapy to decrease pain, tissue extensibility, strengthening prior to planned B/L TKA .        Torrie Carroll, PT 5/15/2018  12:01 PM

## 2018-05-18 ENCOUNTER — HOSPITAL ENCOUNTER (OUTPATIENT)
Dept: PHYSICAL THERAPY | Age: 68
Discharge: HOME | End: 2018-05-18
Payer: COMMERCIAL

## 2018-05-18 PROCEDURE — 97110 THERAPEUTIC EXERCISES: CPT | Performed by: PHYSICAL THERAPY ASSISTANT

## 2018-05-18 NOTE — PROGRESS NOTES
PT DAILY TREATMENT NOTE 2-15    Patient Name: Suyapa Vázquez  Date:2018  : 1950   [x]  Patient  Verified  Payor: Dae Cooney / Plan: Merlene Durbin / Product Type: PPO /    In time: 12:45 PM Out time: 1:40 PM  Total Treatment Time (min): 55  Timed  60  Visit #: 7    Treatment Area: Left knee pain [M25.562]  Right knee pain [M25.561]    SUBJECTIVE    Pain Level (0-10 scale): 3-4/10 bilateral knee pain. Patient report fair compliance with HEP \"it's a really busy time at work right now so I try to get as much in as I can. \"    Any medication changes, allergies to medications, adverse drug reactions, diagnosis change, or new procedure performed?: [x] No    [] Yes (see summary sheet for update)  Subjective functional status/changes:   [] No changes reported  See above.     OBJECTIVE    Modality rationale: decrease edema, decrease inflammation and decrease pain to improve the patients ability to perform daily chores with dec'd pain   Min Type Additional Details    [] Estim: []Att   []Unatt        []TENS instruct                  []IFC  []Premod   []NMES                     []Other:  []w/US   []w/ice   []w/heat  Position:  Location:    []  Traction: [] Cervical       []Lumbar                       [] Prone          []Supine                       []Intermittent   []Continuous Lbs:  [] before manual  [] after manual  []w/heat    []  Ultrasound: []Continuous   [] Pulsed at:                            []1MHz   []3MHz Location:  W/cm2:    []  Paraffin         Location:  []w/heat   10 [x]  Ice     []  Heat  []  Ice massage Position: supine, LE's elevated  Location: b/l knees    []  Laser  []  Other: Position:  Location:    []  Vasopneumatic Device Pressure:       [] lo [] med [] hi   Temperature:    [x] Skin assessment post-treatment:  [x]intact []redness- no adverse reaction    []redness  adverse reaction:     45 min Therapeutic Exercise:  [x] See flow sheet :    Rationale: increase ROM and increase strength to improve the patients ability to perform household chores, activities of daily living, walk her dog with dec'd symptoms    - min Manual Therapy:  Use of roller on B/L ITB. Rationale: decrease pain and increase tissue extensibility  to improve the patients ability to exercise, walk with dec'd pain     min Gait Training:  ___ feet with ___ device on level surfaces with ___ level of assist   Rationale: With   [] TE   [] TA   [] neuro   [] other: Patient Education: [x] Review HEP    [] Progressed/Changed HEP based on:   [] positioning   [] body mechanics   [] transfers   [] heat/ice application    [] other:      Other Objective/Functional Measures:     Pain Level (0-10 scale) post treatment: 4-5/10    ASSESSMENT/Changes in Function:   Minor cues for form/technique with TKE and s/l hip abduction for correct muscle activation. Encouraged patient to be more consistent with HEP for maximum benefit from PT prior to surgery. Patient will continue to benefit from skilled PT services to modify and progress therapeutic interventions, address functional mobility deficits, address ROM deficits, address strength deficits, analyze and address soft tissue restrictions, analyze and cue movement patterns, analyze and modify body mechanics/ergonomics and assess and modify postural abnormalities to attain remaining goals. []  See Plan of Care  []  See progress note/recertification  []  See Discharge Summary         Long Term Goals: To be accomplished in 4-6 weeks:                        1) Pt independent in final HEP.                          2) Pt will have good understanding of an aquatic exercise program for strengthening, ROM and flexibility                         3) Pt will report 25% improvement in symptoms while walking her dog                        4) Pt will report mild TTP medial left distal HS versus moderate at evaluation to decrease soft tissue sensitivity prior to joint surgery          PLAN  [x]  Upgrade activities as tolerated     [x]  Continue plan of care  []  Update interventions per flow sheet       []  Discharge due to:_  [x]  Other:_continue with manual therapy to decrease pain, tissue extensibility, strengthening prior to planned B/L TKA .        Dayton Contreras, LEWIS 5/18/2018  12:45 PM

## 2018-05-29 ENCOUNTER — APPOINTMENT (OUTPATIENT)
Dept: PHYSICAL THERAPY | Age: 68
End: 2018-05-29
Payer: COMMERCIAL

## 2018-05-29 DIAGNOSIS — E78.00 HYPERCHOLESTEREMIA: ICD-10-CM

## 2018-05-29 RX ORDER — SIMVASTATIN 40 MG/1
TABLET, FILM COATED ORAL
Qty: 90 TAB | Refills: 1 | Status: SHIPPED | OUTPATIENT
Start: 2018-05-29 | End: 2018-12-09 | Stop reason: SDUPTHER

## 2018-05-31 ENCOUNTER — HOSPITAL ENCOUNTER (OUTPATIENT)
Dept: PHYSICAL THERAPY | Age: 68
Discharge: HOME | End: 2018-05-31
Payer: COMMERCIAL

## 2018-05-31 PROCEDURE — 97110 THERAPEUTIC EXERCISES: CPT | Performed by: PHYSICAL THERAPIST

## 2018-05-31 PROCEDURE — 97140 MANUAL THERAPY 1/> REGIONS: CPT | Performed by: PHYSICAL THERAPIST

## 2018-05-31 NOTE — PROGRESS NOTES
PT DAILY TREATMENT NOTE 2-15    Patient Name: Negro Ace  Date:2018  : 1950   [x]  Patient  Verified  Payor: Aston Mejia / Plan: Ursula Pinto / Product Type: PPO /    In time:955 AM Out time: 1110  Total Treatment Time (min): 75  Timed  65  Visit #: 8    Treatment Area: Left knee pain [M25.562]  Right knee pain [M25.561]    SUBJECTIVE    Pain Level (0-10 scale): 5-6/10 bilateral knee pain. Pt reports her knees have really been hurting more, she knows she has been overdoing it. Pt reports that she could barely walk or drive home after an event last night, she is still emotional about that. Her surgery is scheduled for     Any medication changes, allergies to medications, adverse drug reactions, diagnosis change, or new procedure performed?: [x] No    [] Yes (see summary sheet for update)  Subjective functional status/changes:   [] No changes reported  See above.     OBJECTIVE    Modality rationale: decrease edema, decrease inflammation and decrease pain to improve the patients ability to perform daily chores with dec'd pain   Min Type Additional Details    [] Estim: []Att   []Unatt        []TENS instruct                  []IFC  []Premod   []NMES                     []Other:  []w/US   []w/ice   []w/heat  Position:  Location:    []  Traction: [] Cervical       []Lumbar                       [] Prone          []Supine                       []Intermittent   []Continuous Lbs:  [] before manual  [] after manual  []w/heat    []  Ultrasound: []Continuous   [] Pulsed at:                            []1MHz   []3MHz Location:  W/cm2:    []  Paraffin         Location:  []w/heat   10 [x]  Ice     []  Heat  []  Ice massage Position: supine, LE's elevated  Location: b/l knees    []  Laser  []  Other: Position:  Location:    []  Vasopneumatic Device Pressure:       [] lo [] med [] hi   Temperature:    [x] Skin assessment post-treatment:  [x]intact []redness- no adverse reaction []redness  adverse reaction:     55  min Therapeutic Exercise:  _ See flow sheet :    Rationale: increase ROM and increase strength to improve the patients ability to perform household chores, activities of daily living, walk her dog with dec'd symptoms    10 min Manual Therapy:  Grade I and II PF mobs all ways for pain relief     Rationale: decrease pain and increase tissue extensibility  to improve the patients ability to exercise, walk with dec'd pain     min Gait Training:  ___ feet with ___ device on level surfaces with ___ level of assist   Rationale: With   [] TE   [] TA   [] neuro   [] other: Patient Education: [x] Review HEP    [] Progressed/Changed HEP based on:   [] positioning   [] body mechanics   [] transfers   [] heat/ice application    [] other:      Other Objective/Functional Measures:     Pain Level (0-10 scale) post treatment: 3-4/10    ASSESSMENT/Changes in Function:   Pt with increased pain after increased activity at work (standing, etc.) and decreased time at the pool. Encouraged pt to rest when she can, sit when she can to decrease irritability prior to B/L TKA June 18th. Patient will continue to benefit from skilled PT services to modify and progress therapeutic interventions, address functional mobility deficits, address ROM deficits, address strength deficits, analyze and address soft tissue restrictions, analyze and cue movement patterns, analyze and modify body mechanics/ergonomics and assess and modify postural abnormalities to attain remaining goals. []  See Plan of Care  []  See progress note/recertification  []  See Discharge Summary         Short Term Goals:  To be accomplished in 2-3 weeks:                        1) Pt independent in HEP from day 1                        2) Pt will report she has stopped sleeping with pillow under her knee to avoid joint contracture/further limitations in knee extension  3) Pt will be able to walk 50 ft or more in clinic with SPC with correct sequencing to decrease left knee pain     Long Term Goals: To be accomplished in 4-6 weeks:                        1) Pt independent in final HEP. 2) Pt will have good understanding of an aquatic exercise program for strengthening, ROM and flexibility                         3) Pt will report 25% improvement in symptoms while walking her dog                        4) Pt will report mild TTP medial left distal HS versus moderate at evaluation to decrease soft tissue sensitivity prior to joint surgery      PLAN  [x]  Upgrade activities as tolerated     [x]  Continue plan of care  []  Update interventions per flow sheet       []  Discharge due to:_  [x]  Other:_continue with manual therapy to decrease pain, tissue extensibility, strengthening prior to planned B/L TKA .        Ben Zavala, PT 5/31/2018  12:45 PM

## 2018-06-05 ENCOUNTER — APPOINTMENT (OUTPATIENT)
Dept: PHYSICAL THERAPY | Age: 68
End: 2018-06-05
Payer: COMMERCIAL

## 2018-06-06 ENCOUNTER — HOSPITAL ENCOUNTER (OUTPATIENT)
Dept: PREADMISSION TESTING | Age: 68
Discharge: HOME | End: 2018-06-06
Payer: COMMERCIAL

## 2018-06-06 VITALS
OXYGEN SATURATION: 96 % | BODY MASS INDEX: 29.66 KG/M2 | WEIGHT: 184.53 LBS | TEMPERATURE: 98.2 F | RESPIRATION RATE: 20 BRPM | HEART RATE: 63 BPM | HEIGHT: 66 IN

## 2018-06-06 LAB
ABO + RH BLD: NORMAL
ALBUMIN SERPL-MCNC: 4.3 G/DL (ref 3.5–5)
ALBUMIN/GLOB SERPL: 1.5 {RATIO} (ref 1.1–2.2)
ALP SERPL-CCNC: 82 U/L (ref 45–117)
ALT SERPL-CCNC: 50 U/L (ref 12–78)
ANION GAP SERPL CALC-SCNC: 7 MMOL/L (ref 5–15)
APPEARANCE UR: CLEAR
AST SERPL-CCNC: 39 U/L (ref 15–37)
ATRIAL RATE: 58 BPM
BACTERIA URNS QL MICRO: NEGATIVE /HPF
BASOPHILS # BLD: 0 K/UL (ref 0–0.1)
BASOPHILS NFR BLD: 1 % (ref 0–1)
BILIRUB SERPL-MCNC: 0.4 MG/DL (ref 0.2–1)
BILIRUB UR QL: NEGATIVE
BLOOD GROUP ANTIBODIES SERPL: NORMAL
BUN SERPL-MCNC: 15 MG/DL (ref 6–20)
BUN/CREAT SERPL: 22 (ref 12–20)
CALCIUM SERPL-MCNC: 10.2 MG/DL (ref 8.5–10.1)
CALCULATED P AXIS, ECG09: 68 DEGREES
CALCULATED R AXIS, ECG10: 30 DEGREES
CALCULATED T AXIS, ECG11: 41 DEGREES
CHLORIDE SERPL-SCNC: 97 MMOL/L (ref 97–108)
CO2 SERPL-SCNC: 29 MMOL/L (ref 21–32)
COLOR UR: NORMAL
CREAT SERPL-MCNC: 0.69 MG/DL (ref 0.55–1.02)
CRP SERPL-MCNC: <0.29 MG/DL
DIAGNOSIS, 93000: NORMAL
DIFFERENTIAL METHOD BLD: NORMAL
EOSINOPHIL # BLD: 0.3 K/UL (ref 0–0.4)
EOSINOPHIL NFR BLD: 5 % (ref 0–7)
EPITH CASTS URNS QL MICRO: NORMAL /LPF
ERYTHROCYTE [DISTWIDTH] IN BLOOD BY AUTOMATED COUNT: 12.5 % (ref 11.5–14.5)
ERYTHROCYTE [SEDIMENTATION RATE] IN BLOOD: 8 MM/HR (ref 0–30)
EST. AVERAGE GLUCOSE BLD GHB EST-MCNC: 108 MG/DL
GLOBULIN SER CALC-MCNC: 2.8 G/DL (ref 2–4)
GLUCOSE SERPL-MCNC: 75 MG/DL (ref 65–100)
GLUCOSE UR STRIP.AUTO-MCNC: NEGATIVE MG/DL
HBA1C MFR BLD: 5.4 % (ref 4.2–6.3)
HCT VFR BLD AUTO: 41.2 % (ref 35–47)
HGB BLD-MCNC: 13.3 G/DL (ref 11.5–16)
HGB UR QL STRIP: NEGATIVE
HYALINE CASTS URNS QL MICRO: NORMAL /LPF (ref 0–5)
IMM GRANULOCYTES # BLD: 0 K/UL (ref 0–0.04)
IMM GRANULOCYTES NFR BLD AUTO: 0 % (ref 0–0.5)
KETONES UR QL STRIP.AUTO: NEGATIVE MG/DL
LEUKOCYTE ESTERASE UR QL STRIP.AUTO: NEGATIVE
LYMPHOCYTES # BLD: 2.5 K/UL (ref 0.8–3.5)
LYMPHOCYTES NFR BLD: 43 % (ref 12–49)
MCH RBC QN AUTO: 29.8 PG (ref 26–34)
MCHC RBC AUTO-ENTMCNC: 32.3 G/DL (ref 30–36.5)
MCV RBC AUTO: 92.4 FL (ref 80–99)
MONOCYTES # BLD: 0.5 K/UL (ref 0–1)
MONOCYTES NFR BLD: 8 % (ref 5–13)
NEUTS SEG # BLD: 2.5 K/UL (ref 1.8–8)
NEUTS SEG NFR BLD: 44 % (ref 32–75)
NITRITE UR QL STRIP.AUTO: NEGATIVE
NRBC # BLD: 0 K/UL (ref 0–0.01)
NRBC BLD-RTO: 0 PER 100 WBC
P-R INTERVAL, ECG05: 176 MS
PH UR STRIP: 7 [PH] (ref 5–8)
PLATELET # BLD AUTO: 292 K/UL (ref 150–400)
PMV BLD AUTO: 9.6 FL (ref 8.9–12.9)
POTASSIUM SERPL-SCNC: 3.8 MMOL/L (ref 3.5–5.1)
PROT SERPL-MCNC: 7.1 G/DL (ref 6.4–8.2)
PROT UR STRIP-MCNC: NEGATIVE MG/DL
Q-T INTERVAL, ECG07: 422 MS
QRS DURATION, ECG06: 92 MS
QTC CALCULATION (BEZET), ECG08: 414 MS
RBC # BLD AUTO: 4.46 M/UL (ref 3.8–5.2)
RBC #/AREA URNS HPF: NORMAL /HPF (ref 0–5)
SODIUM SERPL-SCNC: 133 MMOL/L (ref 136–145)
SP GR UR REFRACTOMETRY: 1.01 (ref 1–1.03)
SPECIMEN EXP DATE BLD: NORMAL
UA: UC IF INDICATED,UAUC: NORMAL
UROBILINOGEN UR QL STRIP.AUTO: 0.2 EU/DL (ref 0.2–1)
VENTRICULAR RATE, ECG03: 58 BPM
WBC # BLD AUTO: 5.8 K/UL (ref 3.6–11)
WBC URNS QL MICRO: NORMAL /HPF (ref 0–4)

## 2018-06-06 PROCEDURE — 93005 ELECTROCARDIOGRAM TRACING: CPT

## 2018-06-06 PROCEDURE — 86900 BLOOD TYPING SEROLOGIC ABO: CPT | Performed by: ORTHOPAEDIC SURGERY

## 2018-06-06 PROCEDURE — 85025 COMPLETE CBC W/AUTO DIFF WBC: CPT | Performed by: ORTHOPAEDIC SURGERY

## 2018-06-06 PROCEDURE — 80053 COMPREHEN METABOLIC PANEL: CPT | Performed by: ORTHOPAEDIC SURGERY

## 2018-06-06 PROCEDURE — 86140 C-REACTIVE PROTEIN: CPT | Performed by: ORTHOPAEDIC SURGERY

## 2018-06-06 PROCEDURE — 84466 ASSAY OF TRANSFERRIN: CPT | Performed by: ORTHOPAEDIC SURGERY

## 2018-06-06 PROCEDURE — 81001 URINALYSIS AUTO W/SCOPE: CPT | Performed by: ORTHOPAEDIC SURGERY

## 2018-06-06 PROCEDURE — 85652 RBC SED RATE AUTOMATED: CPT | Performed by: ORTHOPAEDIC SURGERY

## 2018-06-06 PROCEDURE — 83036 HEMOGLOBIN GLYCOSYLATED A1C: CPT | Performed by: ORTHOPAEDIC SURGERY

## 2018-06-06 PROCEDURE — 36415 COLL VENOUS BLD VENIPUNCTURE: CPT | Performed by: ORTHOPAEDIC SURGERY

## 2018-06-06 RX ORDER — SULINDAC 200 MG/1
200 TABLET ORAL 2 TIMES DAILY
COMMUNITY
End: 2018-06-22

## 2018-06-06 NOTE — PERIOP NOTES
[address] [facility], [address]    MAIN OR [phone]    MAIN PRE OP [phone]    AMBULATORY PRE OP [phone]    PRE-ADMISSION TESTING [phone]       Surgery Date:   06/18/2018        Is surgery arrival time given by surgeon? NO  If NO, [facility] staff will call you between 3 and 7pm the day before your surgery with your arrival time. (If your surgery is on a Monday, we will call you the Friday before.)    Call [phone] after 7pm Monday-Friday if you did not receive your arrival time.     Answers to Common Questions   When You  Arrive   Arrive at the [facility] [address] [facility] on the day of your surgery  Have your insurance card, photo ID, and any copayment (if needed)     Food   and   Drink   NO food or drink after midnight the night before surgery    This means NO water, gum, mints, coffee, juice, etc.  No alcohol (beer, wine, liquor) 24 hours before and after surgery     Medicine to   TAKE   Morning of Surgery   MEDICATIONS TO TAKE THE MORNING OF SURGERY WITH A SIP OF WATER:    None     Medicine  To  STOP   FOR PAIN   NO Aspirin for pain    NO Non-Steroidal Anti-Inflammatory Drugs (NSAIDs:   for example, Ibuprofen (Advil, Motrin), Naproxen (Aleve)   STOP herbal supplements and vitamins 1 week before surgery   You can take Tylenol  follow instructions on the bottle     Blood  Thinners    If you take Aspirin, Plavix, Coumadin, blood-thinning or anti-clot medicine, talk to your surgeon and/or follow the instructions from the doctor who told you to take that medicine     Clothing  Jewelry  Valuables  Bathing     CLOTHING   Wear loose, comfortable clothes   Wear glasses instead of contacts   Leave money, jewelry and valuables at home   No make-up, particularly mascara, the day of surgery   REMOVE ALL piercings, rings, and jewelry - leave at home   Wear hair loose or down; no pony-tails, buns, or metal hair clips    BATHING   Follow all special bath instructions (for total joint replacement, spine and bowel surgeries.)   If you shower the morning of surgery, please do not apply any lotions, powders, or deodorants afterwards. Do not shave or trim anywhere 24 hours before surgery. Going Home  or  Spending the Night    SAME-DAY SURGERY: You must have a responsible adult drive you home and stay with you 24 hours after surgery   ADMITS: If your doctor is keeping you into the hospital after surgery, leave personal belongings/luggage in your car until you have a hospital room number. Hospital discharge time is 12 noon  Drivers must be here before 12 noon unless you are told differently         Follow all instructions so your surgery wont be cancelled. Please, be on time. If a situation occurs and you are delayed the day of surgery, call [phone]     If your physical condition changes (like a fever, cold, flu, etc.) call your surgeon as soon as possible. The Pread[facility] staff can be reached at [phone].  Special Instructions:  · Use Chlorhexidine Care Fusion wash and sponges 3 days prior to surgery as instructed. · Incentive spirometer given with instructions to practice at home and bring back to the hospital on the day of surgery. · [facility] will contact you if your Hemoglobin A1C is greater than 7.5. · Ensure/Glucerna  sample, nutritional information, and Ensure/Glucerna coupon given. · Pain pamphlet and Call Don't Fall reminder reviewed with patient. ·  parking is complimentary Monday - Friday 7 am - 5 pm  · Bring PTA Medication list day of surgery with the last doses taken documented   · Do not bring medication bottles the day of surgery    The patient was contacted  in person. She  verbalize  understanding of all instructions and does not  need reinforcement.

## 2018-06-06 NOTE — H&P
PAT Pre-Op History & Physical    Patient: Dev Segura                  MRN: [id number]          SSN: [id number]  YOB: 1950          Age: 77 y.o. Sex: female                Subjective:   Patient is a 77 y.o.  female who presents with history of chronic bilateral knee pain that has been a problem for 7-8 years but has escalated over the last year. States that her left knee is more painful than her right knee. Rates her knee pain 4/10-7/10 and describes it as a constant aching pain that can be sharp at times. Also c/o right knee \"locking up\" at times. Too much activity or prolonged standing exacerbate her knee pain. She cannot walk her dog for as long as she has in the past due to her knee pain. Has failed steroid joint injections, NSAIDs, PT, and application of ice. The patient was evaluated in the surgeon's office and it was determined that the most appropriate plan of care is to proceed with surgical intervention.   Patient's PCP Gypsy Gonzalez MD            Past Medical History:   Diagnosis Date    Arthritis     L knee OA both knees per pt    History of shingles 09/2017    left eye- treated by opthamology with steroid drops    Hypercholesterolemia     Hypertension     Ill-defined condition     obese  BMI= 30.7 on 1/25/2017    Liver disease     H/O HEPATITIS A  contracted while working in [facility] Other ill-defined conditions(799.89) [address] AS [facility] 6CM TILT      Past Surgical History:   Procedure Laterality Date    COLONOSCOPY N/A 1/23/2018    COLONOSCOPY performed by Clemente Mccarthy MD at [address] [facility], [facility]  1995 & 2005 2015    HX APPENDECTOMY  1980s    HX HEENT      wisdom teeth extraction    HX KNEE ARTHROSCOPY Left 04/2012    A/S Meniscal Repair     HX KNEE ARTHROSCOPY Right 04/2011    A/S Meniscal Repair     HX ORTHOPAEDIC  1995    L BUNIONECTOMY    TOTAL HIP ARTHROPLASTY Left 02/2017 Prior to Admission medications    Medication Sig Start Date End Date Taking? Authorizing Provider   sulindac (CLINORIL) 200 mg tablet Take 200 mg by mouth two (2) times a day. Yes Historical Provider   simvastatin (ZOCOR) 40 mg tablet TAKE 1 TAB BY MOUTH NIGHTLY FOR 30 DAYS. 5/29/18  Yes Cookie Land MD   hydroCHLOROthiazide (HYDRODIURIL) 25 mg tablet TAKE 1 TAB BY MOUTH DAILY FOR 30 DAYS. 2/11/18  Yes Cookie Land MD   diclofenac (VOLTAREN) 1 % gel Apply  to affected area four (4) times daily. Yes Historical Provider   melatonin tab tablet Take 5 mg by mouth nightly as needed. Indications: 2 tabltes   Yes Historical Provider   glucosam-chond-msm-boron-hyal 756-64.6-293-5 mg tab Take 1 Tab by mouth daily. Yes Historical Provider   multivitamin (ONE A DAY) tablet Take 1 Tab by mouth daily. Yes Historical Provider   FLAXSEED OIL (OMEGA 3 PO) Take 1 Cap by mouth daily. Yes Historical Provider   b complex vitamins tablet Take 1 Tab by mouth daily. Yes Historical Provider   nicotinic acid (NIACIN) 500 mg tablet Take 500 mg by mouth Daily (before breakfast). Yes Historical Provider   OTHER Juice plus for vegetable and fruit supplementation    Yes Historical Provider   aspirin delayed-release 81 mg tablet take 81 mg by mouth daily. Yes Historical Provider   diclofenac EC (VOLTAREN) 75 mg EC tablet Take  by mouth. Historical Provider   ibuprofen (MOTRIN) 800 mg tablet Take 800 mg by mouth every six (6) hours as needed for Pain. Indications: OSTEOARTHRITIS    Historical Provider     Current Outpatient Prescriptions   Medication Sig    sulindac (CLINORIL) 200 mg tablet Take 200 mg by mouth two (2) times a day.  simvastatin (ZOCOR) 40 mg tablet TAKE 1 TAB BY MOUTH NIGHTLY FOR 30 DAYS.  hydroCHLOROthiazide (HYDRODIURIL) 25 mg tablet TAKE 1 TAB BY MOUTH DAILY FOR 30 DAYS.  diclofenac (VOLTAREN) 1 % gel Apply  to affected area four (4) times daily.     melatonin tab tablet Take 5 mg by mouth nightly as needed. Indications: 2 tabltes    glucosam-chond-msm-boron-hyal 514-13.7-800-9 mg tab Take 1 Tab by mouth daily.  multivitamin (ONE A DAY) tablet Take 1 Tab by mouth daily.  FLAXSEED OIL (OMEGA 3 PO) Take 1 Cap by mouth daily.  b complex vitamins tablet Take 1 Tab by mouth daily.  nicotinic acid (NIACIN) 500 mg tablet Take 500 mg by mouth Daily (before breakfast).  OTHER Juice plus for vegetable and fruit supplementation     aspirin delayed-release 81 mg tablet take 81 mg by mouth daily.  diclofenac EC (VOLTAREN) 75 mg EC tablet Take  by mouth.  ibuprofen (MOTRIN) 800 mg tablet Take 800 mg by mouth every six (6) hours as needed for Pain. Indications: OSTEOARTHRITIS     No current facility-administered medications for this encounter. No Known Allergies   Social History   Substance Use Topics    Smoking status: Never Smoker    Smokeless tobacco: Never Used    Alcohol use 6.0 - 8.4 oz/week     3 - 7 Standard drinks or equivalent, 4 Glasses of wine, 3 Cans of beer per week      Comment: social glass of wine or beer nightly      History   Drug Use No     Family History   Problem Relation Age of Onset    Heart Disease Mother     Hypertension Mother     Stroke Mother     Elevated Lipids Father     Liver Disease Father     Cancer Father      liver    Hypertension Sister     MS Sister     MS Sister     Heart Disease Maternal Grandmother     Stroke Maternal Grandfather          Review of Systems    Patient denies difficulty swallowing, mouth sores, or loose teeth. Patient denies any recent dental procedures or any planned prior to surgery. Patient denies chest pain, tightness, pain radiating down left arm, palpitations. Denies dizziness, visual disturbances, or lightheadedness. Patient denies shortness of breath, wheezing, cough, fever, or chills. Patient denies diarrhea or abdominal pain. C/o chronic constipation that she controls by eating prunes. Patient denies urinary problems including dysuria, hesitancy, urgency, or incontinence. Denies skin breakdown, rashes, insect bites or open area. C/o bilateral knee pain. Objective:   Patient Vitals for the past 24 hrs:   Temp Pulse Resp SpO2   18 1319 98.2 °F (36.8 °C) 63 20 96 %     Temp (24hrs), Av.2 °F (36.8 °C), Min:98.2 °F (36.8 °C), Max:98.2 °F (36.8 °C)    Body mass index is 29.78 kg/(m^2). Wt Readings from Last 1 Encounters:   18 83.7 kg (184 lb 8.4 oz)        Physical Exam:     General: Pleasant,  cooperative, no apparent distress, appears stated age. Eyes: Conjunctivae/corneas clear. EOMs intact. Nose: Nares normal.   Mouth/Throat: Lips, mucosa, and tongue normal. Teeth and gums normal.   Neck: Supple, symmetrical, trachea midline. Back: Symmetric   Lungs: Clear to auscultation bilaterally. Heart: Regular rate and rhythm, S1, S2 normal. No murmur, click, rub or gallop. Abdomen: Soft, non-tender. Bowel sounds normal. No distention. Musculoskeletal:  Gait is antalgic. Extremities:  Extremities normal, atraumatic, no cyanosis or edema. Calves                                 supple, non tender to palpation. Pulses: 2+ and symmetric bilateral upper extremities. Cap. refill <2 seconds   Skin: Skin color, texture, turgor normal.  No rashes or lesions. Neurologic: CN II-XII grossly intact. Alert and oriented x3.     Labs:   Recent Results (from the past 72 hour(s))   CBC WITH AUTOMATED DIFF    Collection Time: 18  1:50 PM   Result Value Ref Range    WBC 5.8 3.6 - 11.0 K/uL    RBC 4.46 3.80 - 5.20 M/uL    HGB 13.3 11.5 - 16.0 g/dL    HCT 41.2 35.0 - 47.0 %    MCV 92.4 80.0 - 99.0 FL    MCH 29.8 26.0 - 34.0 PG    MCHC 32.3 30.0 - 36.5 g/dL    RDW 12.5 11.5 - 14.5 %    PLATELET 369 054 - 697 K/uL    MPV 9.6 8.9 - 12.9 FL    NRBC 0.0 0  WBC    ABSOLUTE NRBC 0.00 0.00 - 0.01 K/uL    NEUTROPHILS 44 32 - 75 %    LYMPHOCYTES 43 12 - 49 %    MONOCYTES 8 5 - 13 % EOSINOPHILS 5 0 - 7 %    BASOPHILS 1 0 - 1 %    IMMATURE GRANULOCYTES 0 0.0 - 0.5 %    ABS. NEUTROPHILS 2.5 1.8 - 8.0 K/UL    ABS. LYMPHOCYTES 2.5 0.8 - 3.5 K/UL    ABS. MONOCYTES 0.5 0.0 - 1.0 K/UL    ABS. EOSINOPHILS 0.3 0.0 - 0.4 K/UL    ABS. BASOPHILS 0.0 0.0 - 0.1 K/UL    ABS. IMM. GRANS. 0.0 0.00 - 0.04 K/UL    DF AUTOMATED     METABOLIC PANEL, COMPREHENSIVE    Collection Time: 06/06/18  1:50 PM   Result Value Ref Range    Sodium 133 (L) 136 - 145 mmol/L    Potassium 3.8 3.5 - 5.1 mmol/L    Chloride 97 97 - 108 mmol/L    CO2 29 21 - 32 mmol/L    Anion gap 7 5 - 15 mmol/L    Glucose 75 65 - 100 mg/dL    BUN 15 6 - 20 MG/DL    Creatinine 0.69 0.55 - 1.02 MG/DL    BUN/Creatinine ratio 22 (H) 12 - 20      GFR est AA >60 >60 ml/min/1.73m2    GFR est non-AA >60 >60 ml/min/1.73m2    Calcium 10.2 (H) 8.5 - 10.1 MG/DL    Bilirubin, total 0.4 0.2 - 1.0 MG/DL    ALT (SGPT) 50 12 - 78 U/L    AST (SGOT) 39 (H) 15 - 37 U/L    Alk.  phosphatase 82 45 - 117 U/L    Protein, total 7.1 6.4 - 8.2 g/dL    Albumin 4.3 3.5 - 5.0 g/dL    Globulin 2.8 2.0 - 4.0 g/dL    A-G Ratio 1.5 1.1 - 2.2     HEMOGLOBIN A1C WITH EAG    Collection Time: 06/06/18  1:50 PM   Result Value Ref Range    Hemoglobin A1c 5.4 4.2 - 6.3 %    Est. average glucose 108 mg/dL   CULTURE, MRSA    Collection Time: 06/06/18  1:50 PM   Result Value Ref Range    Special Requests: NO SPECIAL REQUESTS      Culture result: MRSA NOT PRESENT AT 12 HOURS     SED RATE (ESR)    Collection Time: 06/06/18  1:50 PM   Result Value Ref Range    Sed rate, automated 8 0 - 30 mm/hr   URINALYSIS W/ REFLEX CULTURE    Collection Time: 06/06/18  1:50 PM   Result Value Ref Range    Color YELLOW/STRAW      Appearance CLEAR CLEAR      Specific gravity 1.008 1.003 - 1.030      pH (UA) 7.0 5.0 - 8.0      Protein NEGATIVE  NEG mg/dL    Glucose NEGATIVE  NEG mg/dL    Ketone NEGATIVE  NEG mg/dL    Bilirubin NEGATIVE  NEG      Blood NEGATIVE  NEG      Urobilinogen 0.2 0.2 - 1.0 EU/dL    Nitrites NEGATIVE  NEG      Leukocyte Esterase NEGATIVE  NEG      WBC 0-4 0 - 4 /hpf    RBC 0-5 0 - 5 /hpf    Epithelial cells FEW FEW /lpf    Bacteria NEGATIVE  NEG /hpf    UA:UC IF INDICATED CULTURE NOT INDICATED BY UA RESULT CNI      Hyaline cast 0-2 0 - 5 /lpf   TYPE & SCREEN    Collection Time: 06/06/18  1:50 PM   Result Value Ref Range    Crossmatch Expiration 06/20/2018     ABO/Rh(D) O NEGATIVE     Antibody screen NEG    C REACTIVE PROTEIN, QT    Collection Time: 06/06/18  1:50 PM   Result Value Ref Range    C-Reactive protein <0.29 <0.60 mg/dL   EKG, 12 LEAD, INITIAL    Collection Time: 06/06/18  2:15 PM   Result Value Ref Range    Ventricular Rate 58 BPM    Atrial Rate 58 BPM    P-R Interval 176 ms    QRS Duration 92 ms    Q-T Interval 422 ms    QTC Calculation (Bezet) 414 ms    Calculated P Axis 68 degrees    Calculated R Axis 30 degrees    Calculated T Axis 41 degrees    Diagnosis       Sinus bradycardia  Low voltage QRS  Borderline ECG  When compared with ECG of 25-JAN-2017 15:19,  No significant change was found  Confirmed by Nayeli Parnell ([id number]) on 6/6/2018 4:53:54 PM         Assessment:     Degenerative joint disease bilateral knees. Plan:     Scheduled for bilateral total knee arthroplasty. Labs and EKG done per surgeon's orders. Lab results and EKG reviewed- unremarkable. MRSA and transferrin pending. Attended joint class 06/06/2018.         Joey Benton NP

## 2018-06-07 ENCOUNTER — HOSPITAL ENCOUNTER (OUTPATIENT)
Dept: PHYSICAL THERAPY | Age: 68
Discharge: HOME | End: 2018-06-07
Payer: COMMERCIAL

## 2018-06-07 LAB
BACTERIA SPEC CULT: NORMAL
BACTERIA SPEC CULT: NORMAL
SERVICE CMNT-IMP: NORMAL

## 2018-06-07 PROCEDURE — 97110 THERAPEUTIC EXERCISES: CPT | Performed by: PHYSICAL THERAPIST

## 2018-06-07 PROCEDURE — 97116 GAIT TRAINING THERAPY: CPT | Performed by: PHYSICAL THERAPIST

## 2018-06-07 NOTE — PROGRESS NOTES
Problem: Patient Education: Go to Patient Education Activity  Goal: Patient/Family Education  Outcome: Resolved/Met Date Met: 06/07/18  The patient attended the pre-operative joint replacement class. The content of the class, using a power-point presentation as well as visual demonstration was specific for patients undergoing total knee and total hip replacements. A pre-operative education booklet was provided to all patients. At the conclusion of class an opportunity was offered for any question or concerns the patient or family may have regarding their upcoming scheduled procedure. Patient attended class on 6/6/18, no  present. Pt for bilat knee procedure.

## 2018-06-07 NOTE — PROGRESS NOTES
PT Re-eval / DAILY TREATMENT NOTE 2-15    Patient Name: Franchesca Hurt  Date:2018  : 1950   [x]  Patient  Verified  Payor: Sophie Dial / Plan: [place] / Product Type: PPO /    In time:515 PM Out time: 6:30  Total Treatment Time (min): 75  Timed  65  Visit #: 9    Treatment Area: Left knee pain [M25.562]  Right knee pain [M25.561]    SUBJECTIVE    Pain Level (0-10 scale): 3/10 bilateral knee pain. Pt reports her friend gave her a SPC to borrow for upstairs in her house. She was able to get to the pool earlier this week because of only one visit in PT. Any medication changes, allergies to medications, adverse drug reactions, diagnosis change, or new procedure performed?: [x] No    [] Yes (see summary sheet for update)  Subjective functional status/changes:   [] No changes reported  See above. OBJECTIVE                                                ROM:  Knee Left 3 deg  to 130 deg         Right:  5 deg to 134 deg      Strength:  L hip flexion 4 , R 5  L knee ext 5 Mild, R 5    Bridge: pt able to perform a bridge. Palpation:  Tenderness to palpation: R distal ITB  L medial distal hamstring, ITB.           Modality rationale: decrease edema, decrease inflammation and decrease pain to improve the patients ability to perform daily chores with dec'd pain   Min Type Additional Details    [] Estim: []Att   []Unatt        []TENS instruct                  []IFC  []Premod   []NMES                     []Other:  []w/US   []w/ice   []w/heat  Position:  Location:    []  Traction: [] Cervical       []Lumbar                       [] Prone          []Supine                       []Intermittent   []Continuous Lbs:  [] before manual  [] after manual  []w/heat    []  Ultrasound: []Continuous   [] Pulsed at:                            []1MHz   []3MHz Location:  W/cm2:    []  Paraffin         Location:  []w/heat   10 [x]  Ice     []  Heat  []  Ice massage Position: supine, LE's elevated  Location: b/l knees    []  Laser  []  Other: Position:  Location:    []  Vasopneumatic Device Pressure:       [] lo [] med [] hi   Temperature:    [x] Skin assessment post-treatment:  [x]intact []redness- no adverse reaction    []redness  adverse reaction:     55  min Therapeutic Exercise:  _ See flow sheet : Re-assessment. Rationale: increase ROM and increase strength to improve the patients ability to perform household chores, activities of daily living, walk her dog with dec'd symptoms     min Manual Therapy:     Rationale: decrease pain and increase tissue extensibility  to improve the patients ability to exercise, walk with dec'd pain    10 min Gait Training:  With SPC that pt brought in today, pt asked for PT to adjust cane - adjusted for pt and instructed pt on how to use SPC to decrease strain to L LE.  50 ft at least 4 reps. Rationale: With   [] TE   [] TA   [] neuro   [] other: Patient Education: [x] Review HEP    [] Progressed/Changed HEP based on:   [] positioning   [] body mechanics   [] transfers   [] heat/ice application    [] other:      Other Objective/Functional Measures:     Pain Level (0-10 scale) post treatment: 4/10    ASSESSMENT/Changes in Function:   Pt with 9 skilled PT visits at this time from 04/12 through 06/07 for B/L knee pain prior to B/L TKA June 18th. Pt with improved knee extension, improved strength and improved awareness of appropriate use of SPC and aquatic exercise. Pt will be coming next week for final visit prior to TKA. Patient will continue to benefit from skilled PT services to modify and progress therapeutic interventions, address functional mobility deficits, address ROM deficits, address strength deficits, analyze and address soft tissue restrictions, analyze and cue movement patterns, analyze and modify body mechanics/ergonomics and assess and modify postural abnormalities to attain remaining goals.      []  See Plan of Care  []  See progress note/recertification  []  See Discharge Summary         Short Term Goals: To be accomplished in 2-3 weeks:                        1) Pt independent in HEP from day 1 MET                         2) Pt will report she has stopped sleeping with pillow under her knee to avoid joint contracture/further limitations in knee extension  MET   3) Pt will be able to walk 50 ft or more in clinic with [facility] with correct sequencing to decrease left knee pain MET      Long Term Goals: To be accomplished in 4-6 weeks:                        1) Pt independent in final HEP. 2) Pt will have good understanding of an aquatic exercise program for strengthening, ROM and flexibility                         3) Pt will report 25% improvement in symptoms while walking her dog                          4) Pt will report mild TTP medial left distal HS versus moderate at evaluation to decrease soft tissue sensitivity prior to joint surgery  Consider MET (pt reports mild to moderate). PLAN  [x]  Upgrade activities as tolerated     [x]  Continue plan of care  []  Update interventions per flow sheet       []  Discharge due to:_  [x]  Other:_continue with manual therapy to decrease pain, tissue extensibility, strengthening prior to planned B/L TKA .        Elaine Watts, PT 6/7/2018  12:45 PM

## 2018-06-08 LAB — TRANSFERRIN SERPL-MCNC: 344 MG/DL (ref 200–370)

## 2018-06-12 ENCOUNTER — HOSPITAL ENCOUNTER (OUTPATIENT)
Dept: PHYSICAL THERAPY | Age: 68
Discharge: HOME | End: 2018-06-12
Payer: COMMERCIAL

## 2018-06-12 PROCEDURE — 97110 THERAPEUTIC EXERCISES: CPT | Performed by: PHYSICAL THERAPIST

## 2018-06-12 NOTE — ANCILLARY DISCHARGE INSTRUCTIONS
[employer] [facility]  [address] [facility], [address]  Phone: [phone]  Fax: [phone]    Discharge Summary    Name: Eduardo Hansen   : 1950   MD: Ady Chapa MD       Treatment Diagnosis: Left knee pain [M25.562]  Right knee pain [M25.561]  Patient Name: Eduardo Hansen  Date:2018  : 1950   [x]  Patient  Verified  Payor: Emmett Ni / Plan: Justen Qasim / Product Type: PPO /    In time:530PM Out time: 640  Total Treatment Time (min): 70  Timed  60  Visit #: 10     Treatment Area: Left knee pain [M25.562]  Right knee pain [M25.561]     SUBJECTIVE     Pain Level (0-10 scale): 4/10 bilateral knee pain. Pt reports she has been using the [facility] a little bit. She reports she is having a railing put in on the 6 steps to get into the home.         Any medication changes, allergies to medications, adverse drug reactions, diagnosis change, or new procedure performed?: [x] No    [] Yes (see summary sheet for update)  Subjective functional status/changes:   [] No changes reported  See above.     OBJECTIVE      Measurements taken at last visit:      ROM:  Knee Left 3 deg  to 130 deg         Right:  5 deg to 134 deg       Strength:  L hip flexion 4 , R 5  L knee ext 5 Mild, R 5     Bridge: pt able to perform a bridge.       Palpation:  Tenderness to palpation: R distal ITB  L medial distal hamstring, ITB.                  Modality rationale: decrease edema, decrease inflammation and decrease pain to improve the patients ability to perform daily chores with dec'd pain   Min Type Additional Details     [] Estim: []Att   []Unatt        []TENS instruct                  []IFC  []Premod   []NMES                     []Other:  []w/US   []w/ice   []w/heat  Position:  Location:     []  Traction: [] Cervical       []Lumbar                       [] Prone          []Supine                       []Intermittent   []Continuous Lbs:  [] before manual  [] after manual  []w/heat     []  Ultrasound: []Continuous   [] Pulsed at:                            []1MHz   []3MHz Location:  W/cm2:     []  Paraffin     Location:  []w/heat   10 [x]  Ice     []  Heat  []  Ice massage Position: supine, LE's elevated  Location: b/l knees     []  Laser  []  Other: Position:  Location:     []  Vasopneumatic Device Pressure:       [] lo [] med [] hi   Temperature:    [x] Skin assessment post-treatment:  [x]intact []redness- no adverse reaction    []redness  adverse reaction:      60  min Therapeutic Exercise:  _ See flow sheet : Discussed her upcoming TKA surgery, encouraged the handrails, encouraged help from friend/family members after surgery for meals, etc.          Rationale: increase ROM and increase strength to improve the patients ability to perform household chores, activities of daily living, walk her dog with dec'd symptoms       min Manual Therapy:     Rationale: decrease pain and increase tissue extensibility  to improve the patients ability to exercise, walk with dec'd pain     10 min Gait Training:  With SPC that pt brought in today, pt asked for PT to adjust cane - adjusted for pt and instructed pt on how to use SPC to decrease strain to L LE.  50 ft at least 4 reps. Rationale:      With   [] TE   [] TA   [] neuro   [] other: Patient Education: [x] Review HEP    [] Progressed/Changed HEP based on:   [] positioning   [] body mechanics   [] transfers   [] heat/ice application    [] other:       Other Objective/Functional Measures:      Pain Level (0-10 scale) post treatment: no rating given after ice     ASSESSMENT/Changes in Function:   Pt with 10 skilled PT visits at this time from 04/12 through 06/07 for B/L knee pain prior to B/L TKA June 18th.   Pt with improved knee extension, improved strength and improved awareness of appropriate use of SPC and aquatic exercise.           []  See Plan of Care  []  See progress note/recertification  []  See Discharge Summary      Short Term Goals: To be accomplished in 2-3 weeks:                        8) Pt independent in HEP from day 1 MET                         5) Pt will report she has stopped sleeping with pillow under her knee to avoid joint contracture/further limitations in knee extension  MET   3) Pt will be able to walk 50 ft or more in clinic with [facility] with correct sequencing to decrease left knee pain MET       Long Term Goals: To be accomplished in 4-6 weeks:                        1) Pt independent in final HEP.  MET                        4) Pt will have good understanding of an aquatic exercise program for strengthening, ROM and flexibility MET                         3) Pt will report 25% improvement in symptoms while walking her dog  MET                         4) Pt will report mild TTP medial left distal HS versus moderate at evaluation to decrease soft tissue sensitivity prior to joint surgery  Consider MET (pt reports mild to moderate).       PLAN  DC to HEP, pt to have B/L TKA 06/18     Rick Moseley, PT             6/12/2018                                        12:45 PM                             Rick Moseley, PT 6/12/2018 6:51 PM

## 2018-06-12 NOTE — PROGRESS NOTES
PT DAILY TREATMENT NOTE 2-15    Patient Name: Jamee Thomas  Date:2018  : 1950   [x]  Patient  Verified  Payor: Valencia Hummel / Plan: Donnell Poster / Product Type: PPO /    In time:530PM Out time: 640  Total Treatment Time (min): 70  Timed  60  Visit #: 10    Treatment Area: Left knee pain [M25.562]  Right knee pain [M25.561]    SUBJECTIVE    Pain Level (0-10 scale): 4/10 bilateral knee pain. Pt reports she has been using the [facility] a little bit. She reports she is having a railing put in on the 6 steps to get into the home. Any medication changes, allergies to medications, adverse drug reactions, diagnosis change, or new procedure performed?: [x] No    [] Yes (see summary sheet for update)  Subjective functional status/changes:   [] No changes reported  See above. OBJECTIVE     Measurements taken at last visit:                                                 ROM:  Knee Left 3 deg  to 130 deg         Right:  5 deg to 134 deg      Strength:  L hip flexion 4 , R 5  L knee ext 5 Mild, R 5    Bridge: pt able to perform a bridge. Palpation:  Tenderness to palpation: R distal ITB  L medial distal hamstring, ITB.           Modality rationale: decrease edema, decrease inflammation and decrease pain to improve the patients ability to perform daily chores with dec'd pain   Min Type Additional Details    [] Estim: []Att   []Unatt        []TENS instruct                  []IFC  []Premod   []NMES                     []Other:  []w/US   []w/ice   []w/heat  Position:  Location:    []  Traction: [] Cervical       []Lumbar                       [] Prone          []Supine                       []Intermittent   []Continuous Lbs:  [] before manual  [] after manual  []w/heat    []  Ultrasound: []Continuous   [] Pulsed at:                            []1MHz   []3MHz Location:  W/cm2:    []  Paraffin         Location:  []w/heat   10 [x]  Ice     []  Heat  []  Ice massage Position: supine, LE's elevated  Location: b/l knees    []  Laser  []  Other: Position:  Location:    []  Vasopneumatic Device Pressure:       [] lo [] med [] hi   Temperature:    [x] Skin assessment post-treatment:  [x]intact []redness- no adverse reaction    []redness  adverse reaction:     60  min Therapeutic Exercise:  _ See flow sheet : Discussed her upcoming TKA surgery, encouraged the handrails, encouraged help from friend/family members after surgery for meals, etc.         Rationale: increase ROM and increase strength to improve the patients ability to perform household chores, activities of daily living, walk her dog with dec'd symptoms     min Manual Therapy:     Rationale: decrease pain and increase tissue extensibility  to improve the patients ability to exercise, walk with dec'd pain    10 min Gait Training:  With SPC that pt brought in today, pt asked for PT to adjust cane - adjusted for pt and instructed pt on how to use SPC to decrease strain to L LE.  50 ft at least 4 reps. Rationale: With   [] TE   [] TA   [] neuro   [] other: Patient Education: [x] Review HEP    [] Progressed/Changed HEP based on:   [] positioning   [] body mechanics   [] transfers   [] heat/ice application    [] other:      Other Objective/Functional Measures:     Pain Level (0-10 scale) post treatment: no rating given after ice    ASSESSMENT/Changes in Function:   Pt with 10 skilled PT visits at this time from 04/12 through 06/07 for B/L knee pain prior to B/L TKA June 18th. Pt with improved knee extension, improved strength and improved awareness of appropriate use of SPC and aquatic exercise. []  See Plan of Care  []  See progress note/recertification  []  See Discharge Summary         Short Term Goals:  To be accomplished in 2-3 weeks:                        1) Pt independent in HEP from day 1 MET                         2) Pt will report she has stopped sleeping with pillow under her knee to avoid joint contracture/further limitations in knee extension  MET   3) Pt will be able to walk 50 ft or more in clinic with [facility] with correct sequencing to decrease left knee pain MET      Long Term Goals: To be accomplished in 4-6 weeks:                        1) Pt independent in final HEP. MET                        2) Pt will have good understanding of an aquatic exercise program for strengthening, ROM and flexibility MET                         3) Pt will report 25% improvement in symptoms while walking her dog  MET                         4) Pt will report mild TTP medial left distal HS versus moderate at evaluation to decrease soft tissue sensitivity prior to joint surgery  Consider MET (pt reports mild to moderate).       PLAN  DC to HEP, pt to have B/L TKA 06/18    Natasha Ansari, PT 6/12/2018  12:45 PM

## 2018-06-15 ENCOUNTER — ANESTHESIA EVENT (OUTPATIENT)
Dept: SURGERY | Age: 68
DRG: 462 | End: 2018-06-15
Payer: COMMERCIAL
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
## 2018-06-18 ENCOUNTER — HOSPITAL ENCOUNTER (INPATIENT)
Age: 68
LOS: 4 days | Discharge: NURSING, REHAB OR LONG-TERM CARE | DRG: 462 | End: 2018-06-22
Attending: ORTHOPAEDIC SURGERY | Admitting: ORTHOPAEDIC SURGERY
Payer: COMMERCIAL

## 2018-06-18 ENCOUNTER — APPOINTMENT (OUTPATIENT)
Dept: GENERAL RADIOLOGY | Age: 68
DRG: 462 | End: 2018-06-18
Attending: PHYSICIAN ASSISTANT
Payer: COMMERCIAL

## 2018-06-18 ENCOUNTER — ANESTHESIA (OUTPATIENT)
Dept: SURGERY | Age: 68
DRG: 462 | End: 2018-06-18
Payer: COMMERCIAL

## 2018-06-18 DIAGNOSIS — M17.0 PRIMARY OSTEOARTHRITIS OF BOTH KNEES: Primary | ICD-10-CM

## 2018-06-18 LAB
GLUCOSE BLD STRIP.AUTO-MCNC: 79 MG/DL (ref 65–100)
GLUCOSE BLD STRIP.AUTO-MCNC: 81 MG/DL (ref 65–100)
SERVICE CMNT-IMP: NORMAL
SERVICE CMNT-IMP: NORMAL

## 2018-06-18 PROCEDURE — 74011250636 HC RX REV CODE- 250/636: Performed by: PHYSICIAN ASSISTANT

## 2018-06-18 PROCEDURE — 77030020788: Performed by: ORTHOPAEDIC SURGERY

## 2018-06-18 PROCEDURE — 77030018836 HC SOL IRR NACL ICUM -A: Performed by: ORTHOPAEDIC SURGERY

## 2018-06-18 PROCEDURE — 65270000029 HC RM PRIVATE

## 2018-06-18 PROCEDURE — 74011250636 HC RX REV CODE- 250/636

## 2018-06-18 PROCEDURE — 77030000032 HC CUF TRNQT ZIMM -B: Performed by: ORTHOPAEDIC SURGERY

## 2018-06-18 PROCEDURE — 77030020263 HC SOL INJ SOD CL0.9% LFCR 1000ML: Performed by: ORTHOPAEDIC SURGERY

## 2018-06-18 PROCEDURE — 74011250636 HC RX REV CODE- 250/636: Performed by: ORTHOPAEDIC SURGERY

## 2018-06-18 PROCEDURE — 77030002933 HC SUT MCRYL J&J -A: Performed by: ORTHOPAEDIC SURGERY

## 2018-06-18 PROCEDURE — 76210000035 HC AMBSU PH I REC 1 TO 1.5 HR: Performed by: ORTHOPAEDIC SURGERY

## 2018-06-18 PROCEDURE — 74011000258 HC RX REV CODE- 258

## 2018-06-18 PROCEDURE — 74011000250 HC RX REV CODE- 250: Performed by: ORTHOPAEDIC SURGERY

## 2018-06-18 PROCEDURE — 64445 NJX AA&/STRD SCIATIC NRV IMG: CPT

## 2018-06-18 PROCEDURE — 64447 NJX AA&/STRD FEMORAL NRV IMG: CPT

## 2018-06-18 PROCEDURE — 77030018883 HC BLD SAW SAG4 STRY -B: Performed by: ORTHOPAEDIC SURGERY

## 2018-06-18 PROCEDURE — 77030016547 HC BLD SAW SAG1 STRY -B: Performed by: ORTHOPAEDIC SURGERY

## 2018-06-18 PROCEDURE — 77030011265 HC ELECTRD BLD HEX COVD -A: Performed by: ORTHOPAEDIC SURGERY

## 2018-06-18 PROCEDURE — 0SRD0J9 REPLACEMENT OF LEFT KNEE JOINT WITH SYNTHETIC SUBSTITUTE, CEMENTED, OPEN APPROACH: ICD-10-PCS | Performed by: ORTHOPAEDIC SURGERY

## 2018-06-18 PROCEDURE — 73560 X-RAY EXAM OF KNEE 1 OR 2: CPT

## 2018-06-18 PROCEDURE — 74011000272 HC RX REV CODE- 272: Performed by: ORTHOPAEDIC SURGERY

## 2018-06-18 PROCEDURE — 77030020782 HC GWN BAIR PAWS FLX 3M -B

## 2018-06-18 PROCEDURE — 77030003601 HC NDL NRV BLK BBMI -A

## 2018-06-18 PROCEDURE — 77030031139 HC SUT VCRL2 J&J -A: Performed by: ORTHOPAEDIC SURGERY

## 2018-06-18 PROCEDURE — 77030039266 HC ADH SKN EXOFIN S2SG -A: Performed by: ORTHOPAEDIC SURGERY

## 2018-06-18 PROCEDURE — 76060000065 HC AMB SURG ANES 2.5 TO 3 HR: Performed by: ORTHOPAEDIC SURGERY

## 2018-06-18 PROCEDURE — 74011000250 HC RX REV CODE- 250: Performed by: ANESTHESIOLOGY

## 2018-06-18 PROCEDURE — 77030022668 HC TIP SUC IRR PULS STRY –B: Performed by: ORTHOPAEDIC SURGERY

## 2018-06-18 PROCEDURE — 74011250637 HC RX REV CODE- 250/637: Performed by: ANESTHESIOLOGY

## 2018-06-18 PROCEDURE — 0SRC0J9 REPLACEMENT OF RIGHT KNEE JOINT WITH SYNTHETIC SUBSTITUTE, CEMENTED, OPEN APPROACH: ICD-10-PCS | Performed by: ORTHOPAEDIC SURGERY

## 2018-06-18 PROCEDURE — 77030012935 HC DRSG AQUACEL BMS -B: Performed by: ORTHOPAEDIC SURGERY

## 2018-06-18 PROCEDURE — 3E0T3BZ INTRODUCTION OF ANESTHETIC AGENT INTO PERIPHERAL NERVES AND PLEXI, PERCUTANEOUS APPROACH: ICD-10-PCS | Performed by: ANESTHESIOLOGY

## 2018-06-18 PROCEDURE — 76030000022 HC AMB SURG 2.5 TO 3 HR INTENSV-TIER 1: Performed by: ORTHOPAEDIC SURGERY

## 2018-06-18 PROCEDURE — 77030020262 HC SOL INJ SOD CL 0.9% 100ML: Performed by: ORTHOPAEDIC SURGERY

## 2018-06-18 PROCEDURE — C1776 JOINT DEVICE (IMPLANTABLE): HCPCS | Performed by: ORTHOPAEDIC SURGERY

## 2018-06-18 PROCEDURE — 74011250637 HC RX REV CODE- 250/637: Performed by: ORTHOPAEDIC SURGERY

## 2018-06-18 PROCEDURE — 82962 GLUCOSE BLOOD TEST: CPT

## 2018-06-18 PROCEDURE — 74011000250 HC RX REV CODE- 250

## 2018-06-18 PROCEDURE — 77030028907 HC WRP KNEE WO BGS SOLM -B

## 2018-06-18 PROCEDURE — 74011250637 HC RX REV CODE- 250/637: Performed by: PHYSICIAN ASSISTANT

## 2018-06-18 PROCEDURE — 77030018822 HC SLV COMPR FT COVD -B

## 2018-06-18 PROCEDURE — 77030011640 HC PAD GRND REM COVD -A: Performed by: ORTHOPAEDIC SURGERY

## 2018-06-18 PROCEDURE — 77030013708 HC HNDPC SUC IRR PULS STRY –B: Performed by: ORTHOPAEDIC SURGERY

## 2018-06-18 PROCEDURE — 77030007866 HC KT SPN ANES BBMI -B

## 2018-06-18 PROCEDURE — 77030033067 HC SUT PDO STRATFX SPIR J&J -B: Performed by: ORTHOPAEDIC SURGERY

## 2018-06-18 DEVICE — CRUCIATE RETAINING FEMORAL
Type: IMPLANTABLE DEVICE | Site: KNEE | Status: FUNCTIONAL
Brand: TRIATHLON

## 2018-06-18 DEVICE — TIBIAL BEARING INSERT
Type: IMPLANTABLE DEVICE | Site: KNEE | Status: FUNCTIONAL
Brand: TRIATHLON

## 2018-06-18 DEVICE — COMPONENT TOT KNEE HYBRID POROUS X3 TRIATHLON: Type: IMPLANTABLE DEVICE | Status: FUNCTIONAL

## 2018-06-18 DEVICE — TIBIAL COMPONENT
Type: IMPLANTABLE DEVICE | Site: KNEE | Status: FUNCTIONAL
Brand: TRIATHLON

## 2018-06-18 DEVICE — PATELLA
Type: IMPLANTABLE DEVICE | Site: KNEE | Status: FUNCTIONAL
Brand: TRIATHLON

## 2018-06-18 RX ORDER — KETOROLAC TROMETHAMINE 30 MG/ML
15 INJECTION, SOLUTION INTRAMUSCULAR; INTRAVENOUS
Status: DISCONTINUED | OUTPATIENT
Start: 2018-06-18 | End: 2018-06-18 | Stop reason: HOSPADM

## 2018-06-18 RX ORDER — CEFAZOLIN SODIUM 1 G/3ML
INJECTION, POWDER, FOR SOLUTION INTRAMUSCULAR; INTRAVENOUS AS NEEDED
Status: DISCONTINUED | OUTPATIENT
Start: 2018-06-18 | End: 2018-06-18 | Stop reason: HOSPADM

## 2018-06-18 RX ORDER — SODIUM CHLORIDE, SODIUM LACTATE, POTASSIUM CHLORIDE, CALCIUM CHLORIDE 600; 310; 30; 20 MG/100ML; MG/100ML; MG/100ML; MG/100ML
125 INJECTION, SOLUTION INTRAVENOUS CONTINUOUS
Status: DISCONTINUED | OUTPATIENT
Start: 2018-06-18 | End: 2018-06-18 | Stop reason: HOSPADM

## 2018-06-18 RX ORDER — NALOXONE HYDROCHLORIDE 0.4 MG/ML
0.04 INJECTION, SOLUTION INTRAMUSCULAR; INTRAVENOUS; SUBCUTANEOUS
Status: DISCONTINUED | OUTPATIENT
Start: 2018-06-18 | End: 2018-06-18 | Stop reason: HOSPADM

## 2018-06-18 RX ORDER — ACETAMINOPHEN 500 MG
500-1000 TABLET ORAL
Qty: 60 TAB | Refills: 0 | Status: SHIPPED | OUTPATIENT
Start: 2018-06-18 | End: 2018-11-30 | Stop reason: ALTCHOICE

## 2018-06-18 RX ORDER — OXYCODONE HYDROCHLORIDE 5 MG/1
5 TABLET ORAL
Status: DISCONTINUED | OUTPATIENT
Start: 2018-06-18 | End: 2018-06-22 | Stop reason: HOSPADM

## 2018-06-18 RX ORDER — OXYCODONE HCL 20 MG/1
20 TABLET, FILM COATED, EXTENDED RELEASE ORAL EVERY 12 HOURS
COMMUNITY
End: 2018-06-22

## 2018-06-18 RX ORDER — HYDROMORPHONE HYDROCHLORIDE 1 MG/ML
.25-1 INJECTION, SOLUTION INTRAMUSCULAR; INTRAVENOUS; SUBCUTANEOUS
Status: DISCONTINUED | OUTPATIENT
Start: 2018-06-18 | End: 2018-06-18 | Stop reason: CLARIF

## 2018-06-18 RX ORDER — ASPIRIN 325 MG
325 TABLET, DELAYED RELEASE (ENTERIC COATED) ORAL 2 TIMES DAILY
Qty: 60 TAB | Refills: 0 | Status: SHIPPED | OUTPATIENT
Start: 2018-06-18 | End: 2018-11-30 | Stop reason: ALTCHOICE

## 2018-06-18 RX ORDER — BUPIVACAINE HYDROCHLORIDE 7.5 MG/ML
INJECTION, SOLUTION EPIDURAL; RETROBULBAR AS NEEDED
Status: DISCONTINUED | OUTPATIENT
Start: 2018-06-18 | End: 2018-06-18 | Stop reason: HOSPADM

## 2018-06-18 RX ORDER — SODIUM CHLORIDE, SODIUM LACTATE, POTASSIUM CHLORIDE, CALCIUM CHLORIDE 600; 310; 30; 20 MG/100ML; MG/100ML; MG/100ML; MG/100ML
INJECTION, SOLUTION INTRAVENOUS
Status: DISCONTINUED | OUTPATIENT
Start: 2018-06-18 | End: 2018-06-18 | Stop reason: HOSPADM

## 2018-06-18 RX ORDER — PROPOFOL 10 MG/ML
INJECTION, EMULSION INTRAVENOUS
Status: DISCONTINUED | OUTPATIENT
Start: 2018-06-18 | End: 2018-06-18 | Stop reason: HOSPADM

## 2018-06-18 RX ORDER — ACETAMINOPHEN 325 MG/1
975 TABLET ORAL ONCE
Status: COMPLETED | OUTPATIENT
Start: 2018-06-18 | End: 2018-06-18

## 2018-06-18 RX ORDER — ROPIVACAINE HYDROCHLORIDE 2 MG/ML
INJECTION, SOLUTION EPIDURAL; INFILTRATION; PERINEURAL AS NEEDED
Status: DISCONTINUED | OUTPATIENT
Start: 2018-06-18 | End: 2018-06-18 | Stop reason: HOSPADM

## 2018-06-18 RX ORDER — OXYCODONE HYDROCHLORIDE 5 MG/1
10 TABLET ORAL
Status: DISCONTINUED | OUTPATIENT
Start: 2018-06-18 | End: 2018-06-22 | Stop reason: HOSPADM

## 2018-06-18 RX ORDER — ONDANSETRON 8 MG/1
4 TABLET, ORALLY DISINTEGRATING ORAL
Qty: 30 TAB | Refills: 0 | Status: SHIPPED | OUTPATIENT
Start: 2018-06-18 | End: 2018-11-30 | Stop reason: ALTCHOICE

## 2018-06-18 RX ORDER — MIDAZOLAM HYDROCHLORIDE 1 MG/ML
INJECTION, SOLUTION INTRAMUSCULAR; INTRAVENOUS AS NEEDED
Status: DISCONTINUED | OUTPATIENT
Start: 2018-06-18 | End: 2018-06-18 | Stop reason: HOSPADM

## 2018-06-18 RX ORDER — SODIUM CHLORIDE 9 MG/ML
125 INJECTION, SOLUTION INTRAVENOUS CONTINUOUS
Status: DISPENSED | OUTPATIENT
Start: 2018-06-18 | End: 2018-06-19

## 2018-06-18 RX ORDER — FACIAL-BODY WIPES
10 EACH TOPICAL DAILY PRN
Status: DISCONTINUED | OUTPATIENT
Start: 2018-06-20 | End: 2018-06-22 | Stop reason: HOSPADM

## 2018-06-18 RX ORDER — OXYCODONE HCL 10 MG/1
10 TABLET, FILM COATED, EXTENDED RELEASE ORAL ONCE
Status: COMPLETED | OUTPATIENT
Start: 2018-06-18 | End: 2018-06-18

## 2018-06-18 RX ORDER — CEFAZOLIN SODIUM/WATER 2 G/20 ML
2 SYRINGE (ML) INTRAVENOUS EVERY 8 HOURS
Status: COMPLETED | OUTPATIENT
Start: 2018-06-18 | End: 2018-06-19

## 2018-06-18 RX ORDER — SODIUM CHLORIDE 0.9 % (FLUSH) 0.9 %
5-10 SYRINGE (ML) INJECTION AS NEEDED
Status: DISCONTINUED | OUTPATIENT
Start: 2018-06-18 | End: 2018-06-18 | Stop reason: HOSPADM

## 2018-06-18 RX ORDER — PREGABALIN 75 MG/1
75 CAPSULE ORAL ONCE
Status: COMPLETED | OUTPATIENT
Start: 2018-06-18 | End: 2018-06-18

## 2018-06-18 RX ORDER — NALOXONE HYDROCHLORIDE 0.4 MG/ML
0.4 INJECTION, SOLUTION INTRAMUSCULAR; INTRAVENOUS; SUBCUTANEOUS AS NEEDED
Status: DISCONTINUED | OUTPATIENT
Start: 2018-06-18 | End: 2018-06-22 | Stop reason: HOSPADM

## 2018-06-18 RX ORDER — FLUMAZENIL 0.1 MG/ML
0.2 INJECTION INTRAVENOUS
Status: DISCONTINUED | OUTPATIENT
Start: 2018-06-18 | End: 2018-06-18 | Stop reason: HOSPADM

## 2018-06-18 RX ORDER — LIDOCAINE HYDROCHLORIDE 10 MG/ML
0.1 INJECTION, SOLUTION EPIDURAL; INFILTRATION; INTRACAUDAL; PERINEURAL AS NEEDED
Status: DISCONTINUED | OUTPATIENT
Start: 2018-06-18 | End: 2018-06-18 | Stop reason: HOSPADM

## 2018-06-18 RX ORDER — OXYCODONE HYDROCHLORIDE 5 MG/1
5-10 TABLET ORAL
Qty: 70 TAB | Refills: 0 | Status: SHIPPED | OUTPATIENT
Start: 2018-06-18 | End: 2018-11-30 | Stop reason: ALTCHOICE

## 2018-06-18 RX ORDER — ASPIRIN 325 MG
325 TABLET, DELAYED RELEASE (ENTERIC COATED) ORAL 2 TIMES DAILY
Status: DISCONTINUED | OUTPATIENT
Start: 2018-06-18 | End: 2018-06-22 | Stop reason: HOSPADM

## 2018-06-18 RX ORDER — FENTANYL CITRATE 50 UG/ML
25 INJECTION, SOLUTION INTRAMUSCULAR; INTRAVENOUS
Status: DISCONTINUED | OUTPATIENT
Start: 2018-06-18 | End: 2018-06-18 | Stop reason: HOSPADM

## 2018-06-18 RX ORDER — FENTANYL CITRATE 50 UG/ML
INJECTION, SOLUTION INTRAMUSCULAR; INTRAVENOUS AS NEEDED
Status: DISCONTINUED | OUTPATIENT
Start: 2018-06-18 | End: 2018-06-18 | Stop reason: HOSPADM

## 2018-06-18 RX ORDER — CELECOXIB 100 MG/1
200 CAPSULE ORAL 2 TIMES DAILY
Status: DISCONTINUED | OUTPATIENT
Start: 2018-06-18 | End: 2018-06-22 | Stop reason: HOSPADM

## 2018-06-18 RX ORDER — FENTANYL CITRATE 50 UG/ML
50 INJECTION, SOLUTION INTRAMUSCULAR; INTRAVENOUS
Status: DISCONTINUED | OUTPATIENT
Start: 2018-06-18 | End: 2018-06-18 | Stop reason: HOSPADM

## 2018-06-18 RX ORDER — CELECOXIB 100 MG/1
100 CAPSULE ORAL ONCE
Status: COMPLETED | OUTPATIENT
Start: 2018-06-18 | End: 2018-06-18

## 2018-06-18 RX ORDER — SODIUM CHLORIDE 0.9 % (FLUSH) 0.9 %
5-10 SYRINGE (ML) INJECTION EVERY 8 HOURS
Status: DISCONTINUED | OUTPATIENT
Start: 2018-06-18 | End: 2018-06-18 | Stop reason: HOSPADM

## 2018-06-18 RX ORDER — POVIDONE-IODINE 10 %
SOLUTION, NON-ORAL TOPICAL AS NEEDED
Status: DISCONTINUED | OUTPATIENT
Start: 2018-06-18 | End: 2018-06-18 | Stop reason: HOSPADM

## 2018-06-18 RX ORDER — DIPHENHYDRAMINE HYDROCHLORIDE 50 MG/ML
12.5 INJECTION, SOLUTION INTRAMUSCULAR; INTRAVENOUS
Status: ACTIVE | OUTPATIENT
Start: 2018-06-18 | End: 2018-06-19

## 2018-06-18 RX ORDER — ACETAMINOPHEN 325 MG/1
650 TABLET ORAL EVERY 6 HOURS
Status: DISCONTINUED | OUTPATIENT
Start: 2018-06-18 | End: 2018-06-22 | Stop reason: HOSPADM

## 2018-06-18 RX ORDER — ONDANSETRON 2 MG/ML
4 INJECTION INTRAMUSCULAR; INTRAVENOUS
Status: ACTIVE | OUTPATIENT
Start: 2018-06-18 | End: 2018-06-19

## 2018-06-18 RX ORDER — AMOXICILLIN 250 MG
1 CAPSULE ORAL 2 TIMES DAILY
Status: DISCONTINUED | OUTPATIENT
Start: 2018-06-18 | End: 2018-06-22 | Stop reason: HOSPADM

## 2018-06-18 RX ORDER — HYDROMORPHONE HYDROCHLORIDE 1 MG/ML
0.5 INJECTION, SOLUTION INTRAMUSCULAR; INTRAVENOUS; SUBCUTANEOUS
Status: ACTIVE | OUTPATIENT
Start: 2018-06-18 | End: 2018-06-19

## 2018-06-18 RX ORDER — TRAMADOL HYDROCHLORIDE 50 MG/1
50 TABLET ORAL
COMMUNITY
End: 2018-06-22

## 2018-06-18 RX ORDER — CEFAZOLIN SODIUM/WATER 2 G/20 ML
2 SYRINGE (ML) INTRAVENOUS ONCE
Status: DISCONTINUED | OUTPATIENT
Start: 2018-06-18 | End: 2018-06-18 | Stop reason: HOSPADM

## 2018-06-18 RX ORDER — POLYETHYLENE GLYCOL 3350 17 G/17G
17 POWDER, FOR SOLUTION ORAL DAILY
Status: DISCONTINUED | OUTPATIENT
Start: 2018-06-19 | End: 2018-06-22 | Stop reason: HOSPADM

## 2018-06-18 RX ORDER — TRAMADOL HYDROCHLORIDE 50 MG/1
50 TABLET ORAL
Qty: 60 TAB | Refills: 0 | Status: SHIPPED | OUTPATIENT
Start: 2018-06-18 | End: 2018-11-30 | Stop reason: ALTCHOICE

## 2018-06-18 RX ORDER — IBUPROFEN 800 MG/1
800 TABLET ORAL
Qty: 50 TAB | Refills: 2 | Status: SHIPPED | OUTPATIENT
Start: 2018-06-18 | End: 2018-11-30 | Stop reason: ALTCHOICE

## 2018-06-18 RX ORDER — FAMOTIDINE 20 MG/1
20 TABLET, FILM COATED ORAL 2 TIMES DAILY
Status: DISCONTINUED | OUTPATIENT
Start: 2018-06-18 | End: 2018-06-22 | Stop reason: HOSPADM

## 2018-06-18 RX ORDER — SODIUM CHLORIDE 0.9 % (FLUSH) 0.9 %
5-10 SYRINGE (ML) INJECTION AS NEEDED
Status: DISCONTINUED | OUTPATIENT
Start: 2018-06-18 | End: 2018-06-22 | Stop reason: HOSPADM

## 2018-06-18 RX ORDER — SODIUM CHLORIDE 0.9 % (FLUSH) 0.9 %
5-10 SYRINGE (ML) INJECTION EVERY 8 HOURS
Status: DISCONTINUED | OUTPATIENT
Start: 2018-06-19 | End: 2018-06-21

## 2018-06-18 RX ORDER — OXYCODONE HYDROCHLORIDE 5 MG/1
10 TABLET ORAL
Status: DISCONTINUED | OUTPATIENT
Start: 2018-06-18 | End: 2018-06-18 | Stop reason: HOSPADM

## 2018-06-18 RX ORDER — DIPHENHYDRAMINE HYDROCHLORIDE 50 MG/ML
12.5 INJECTION, SOLUTION INTRAMUSCULAR; INTRAVENOUS AS NEEDED
Status: DISCONTINUED | OUTPATIENT
Start: 2018-06-18 | End: 2018-06-18 | Stop reason: HOSPADM

## 2018-06-18 RX ADMIN — CELECOXIB 200 MG: 100 CAPSULE ORAL at 20:47

## 2018-06-18 RX ADMIN — MIDAZOLAM HYDROCHLORIDE 1 MG: 1 INJECTION, SOLUTION INTRAMUSCULAR; INTRAVENOUS at 12:27

## 2018-06-18 RX ADMIN — CEFAZOLIN SODIUM 2 G: 1 INJECTION, POWDER, FOR SOLUTION INTRAMUSCULAR; INTRAVENOUS at 13:25

## 2018-06-18 RX ADMIN — Medication 10 ML: at 20:48

## 2018-06-18 RX ADMIN — FENTANYL CITRATE 50 MCG: 50 INJECTION, SOLUTION INTRAMUSCULAR; INTRAVENOUS at 12:25

## 2018-06-18 RX ADMIN — SODIUM CHLORIDE, SODIUM LACTATE, POTASSIUM CHLORIDE, CALCIUM CHLORIDE: 600; 310; 30; 20 INJECTION, SOLUTION INTRAVENOUS at 11:00

## 2018-06-18 RX ADMIN — MIDAZOLAM HYDROCHLORIDE 1 MG: 1 INJECTION, SOLUTION INTRAMUSCULAR; INTRAVENOUS at 12:26

## 2018-06-18 RX ADMIN — ROPIVACAINE HYDROCHLORIDE 20 ML: 2 INJECTION, SOLUTION EPIDURAL; INFILTRATION; PERINEURAL at 12:31

## 2018-06-18 RX ADMIN — SODIUM CHLORIDE 125 ML/HR: 900 INJECTION, SOLUTION INTRAVENOUS at 17:01

## 2018-06-18 RX ADMIN — ROPIVACAINE HYDROCHLORIDE 20 ML: 2 INJECTION, SOLUTION EPIDURAL; INFILTRATION; PERINEURAL at 12:46

## 2018-06-18 RX ADMIN — ROPIVACAINE HYDROCHLORIDE 20 ML: 2 INJECTION, SOLUTION EPIDURAL; INFILTRATION; PERINEURAL at 12:36

## 2018-06-18 RX ADMIN — SODIUM CHLORIDE, SODIUM LACTATE, POTASSIUM CHLORIDE, CALCIUM CHLORIDE: 600; 310; 30; 20 INJECTION, SOLUTION INTRAVENOUS at 12:49

## 2018-06-18 RX ADMIN — FAMOTIDINE 20 MG: 20 TABLET ORAL at 20:47

## 2018-06-18 RX ADMIN — ACETAMINOPHEN 975 MG: 325 TABLET ORAL at 12:13

## 2018-06-18 RX ADMIN — CELECOXIB 100 MG: 100 CAPSULE ORAL at 12:14

## 2018-06-18 RX ADMIN — ACETAMINOPHEN 650 MG: 325 TABLET ORAL at 20:47

## 2018-06-18 RX ADMIN — PREGABALIN 75 MG: 75 CAPSULE ORAL at 12:13

## 2018-06-18 RX ADMIN — MIDAZOLAM HYDROCHLORIDE 1 MG: 1 INJECTION, SOLUTION INTRAMUSCULAR; INTRAVENOUS at 12:48

## 2018-06-18 RX ADMIN — ROPIVACAINE HYDROCHLORIDE 20 ML: 2 INJECTION, SOLUTION EPIDURAL; INFILTRATION; PERINEURAL at 12:28

## 2018-06-18 RX ADMIN — FENTANYL CITRATE 50 MCG: 50 INJECTION, SOLUTION INTRAMUSCULAR; INTRAVENOUS at 12:42

## 2018-06-18 RX ADMIN — LIDOCAINE HYDROCHLORIDE 0.1 ML: 10 INJECTION, SOLUTION EPIDURAL; INFILTRATION; INTRACAUDAL; PERINEURAL at 12:14

## 2018-06-18 RX ADMIN — PROPOFOL 75 MCG/KG/MIN: 10 INJECTION, EMULSION INTRAVENOUS at 13:25

## 2018-06-18 RX ADMIN — Medication 2 G: at 20:48

## 2018-06-18 RX ADMIN — OXYCODONE HYDROCHLORIDE 10 MG: 10 TABLET, FILM COATED, EXTENDED RELEASE ORAL at 12:14

## 2018-06-18 RX ADMIN — ASPIRIN 325 MG: 325 TABLET, DELAYED RELEASE ORAL at 20:47

## 2018-06-18 RX ADMIN — MIDAZOLAM HYDROCHLORIDE 2 MG: 1 INJECTION, SOLUTION INTRAMUSCULAR; INTRAVENOUS at 12:25

## 2018-06-18 RX ADMIN — SENNOSIDES AND DOCUSATE SODIUM 1 TABLET: 8.6; 5 TABLET ORAL at 20:47

## 2018-06-18 RX ADMIN — BUPIVACAINE HYDROCHLORIDE 2.7 ML: 7.5 INJECTION, SOLUTION EPIDURAL; RETROBULBAR at 13:10

## 2018-06-18 NOTE — OP NOTES
OPERATIVE REPORT     Admit Date: 6/18/2018  Admit Diagnosis: DEGENERATIVE JOINT DISEASE BILATERAL    Date of Procedure: 6/18/2018   Preoperative Diagnosis: DEGENERATIVE JOINT DISEASE BILATERAL  Postoperative Diagnosis: DEGENERATIVE JOINT DISEASE BILATERAL    Procedure: Procedure(s):  BILATERAL TOTAL KNEE REPLACEMENTS  Surgeon: Rojelio Garcia MD  Assistant(s): Nhan Harris PA-C  Anesthesia: Regional   Estimated Blood Loss: 300cc  Specimens: * No specimens in log *   Complications: None          INDICATIONS:   The patient is a 77 y.o., female who has complained of a long history of knee pain. The patient  had failed conservative treatment and presents for definitive operative care. Informed consent obtained including a discussion of the risks and benefits, which include, but are not limited to, bleeding, infection, neurovascular damage, wound complications, pain and stiffness in the knee, periprosthetic loosening, fracture dislocation and DVT, the patient consented for the procedure. DESCRIPTION OF PROCEDURE:   The proper limb was identified and signed in the preoperative holding area. All questions were answered. After adequate anesthesia, the left lower extremity was prepped and draped in sterile fashion. The patient was positioned supine on the operating room table. A mid-line parapatellar incision was used along with medial arthrotomy. Soft tissue were removed from the patellar fat pad and notch. The patella was cut, peg holes drilled and a metal protector was placed. The MCL was mobilized and the tibia subluxed. The fat pad was removed. Using the tibial cutting guide set perpendicular to the mechanical axis and 3 degrees of slope the tibial cut was performed. The baseplate was sized with the appropriate rotation. The drill and keel were used through the guide for tibial preparation. Residual osteophytes were removed.       The femoral entry hole was drilled and the distal cutting block secured with 6 degrees of valgus. Soft tissues were protected and the distal cut was performed. A posterior referencing guide was used to establish external rotation based on the extension and flexion gaps. The 4-in-1 cutting guide was applied in the appropriate external rotation and sized appropriately. The posterior, champfer and anterior cuts were performed. Excess bone was removed and a lamina  was used to remove any posterior osteophytes from the medial and lateral recess. The posterior capsule and cut surfaces were injected with joint solution. Trials were placed and varus / valgus stability along with flexion and extension were evaluated. Lug holes were drilled. the appropriate patellar button placed. Final trialing was performed with stable ligamentous exam, full extension / flexion and normal patellar tracking. The PCL was tensioned appropriately and recessed as necessary for adequate flexion of the knee. Trial components were removed and the bone was copiously irrigated. The tibia was placed, the poly, femur and then the patella were placed in a press-fit fashion after bony surfaces were cleaned. The incision was irrigated and all excess debri removed from the knee. The knee was kept in full extension as the cement hardened. The capsulotomy was closed w/ #2 Quill suture in a running fashion. The sub-cutaneous tissue was closed with a 0-Vicryl, 2-0 Vicryl and 4-0 Monocryl. A sterile dressing was applied. The patient was awoken from anesthesia and taken to the recovery room in a stable condiition. The left side was done initially and the same procedure was done on the right side. As a separate procedure the right shoulder (subacromial space) was injected after the primary procedure. The shoulder was cleaned with alcohol. Using an 22-gauge needle the knee was injected with 1cc Kenalog 40mg / ml, 2cc Lidocaine 1% and 2cc of Marcaine 0.5%.  A sterile dressing was applied after the injection. 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 Item Serial No.  Lot No. LRB No. Used Action   COMPNT FEM CR TRIATHLN 5 L PA --  - SNA  COMPNT FEM CR TRIATHLN 5 L PA --  NA [employer] HOW [id number] Left 1 Implanted   INSERT TIB ARTC BRNG SZ 4 9MM -- TRIATHLON X3 - SNA  INSERT TIB ARTC BRNG SZ 4 9MM -- TRIATHLON X3 NA [employer] HOW [id number] Left 1 Implanted   BASEPLT TIB PC TRITNM SZ 4 -- TRIATHLON - SNA  BASEPLT TIB PC TRITNM SZ 4 -- TRIATHLON NA [employer] HOW [id number] Left 1 Implanted   PAT ASYM MTL-BK 10MM SZ A35 -- TRIATHLON - SNA  PAT ASYM MTL-BK 10MM SZ A35 -- TRIATHLON NA [employer] HOW [id number] Left 1 Implanted   BASEPLT TIB PC TRITNM SZ 4 -- TRIATHLON - SNA  BASEPLT TIB PC TRITNM SZ 4 -- TRIATHLON NA [employer] HOW [id number] Right 1 Implanted   INSERT TIB ARTC BRNG SZ 4 9MM -- TRIATHLON X3 - SNA  INSERT TIB ARTC BRNG SZ 4 9MM -- TRIATHLON X3 NA [employer] HOW [id number] Right 1 Implanted   COMPNT FEM CR TRIATHLN 5 R PA --  - SNA  COMPNT FEM CR TRIATHLN 5 R PA --  NA [employer] HOW [id number] Right 1 Implanted   PAT ASYM MTL-BK 10MM SZ A35 -- TRIATHLON - SNA   PAT ASYM MTL-BK 10MM SZ A35 -- TRIATHLON NA [employer] HOW [id number] Right 1 Implanted       JUSTIFICATION FOR SURGICAL ASSISTANT:   Surgical Assistant, was requried and necessary in this case, to help with soft tissue retraction, extremity positioning, equiment management, implant management, and wound closure.     Mateo Castellano MD

## 2018-06-18 NOTE — ROUTINE PROCESS
TRANSFER - OUT REPORT:    Verbal report given to Luther Grimm RN on Jamal Xiong  being transferred to  for routine post - op       Report consisted of patients Situation, Background, Assessment and   Recommendations(SBAR). Information from the following report(s) SBAR was reviewed with the receiving nurse. Lines:   Peripheral IV 06/18/18 Left Hand (Active)   Site Assessment Clean, dry, & intact 6/18/2018  3:57 PM   Phlebitis Assessment 0 6/18/2018  3:57 PM   Infiltration Assessment 0 6/18/2018  3:57 PM   Dressing Status Clean, dry, & intact 6/18/2018  3:57 PM   Dressing Type Transparent 6/18/2018  3:57 PM   Hub Color/Line Status Pink; Infusing 6/18/2018  3:57 PM        Opportunity for questions and clarification was provided.       Patient transported with:   Registered Nurse

## 2018-06-18 NOTE — IP AVS SNAPSHOT
[address] [facility] [address] 
 
 
 5[address] [facility] 70 [facility] 
[phone] Patient: Elsa Frausto MRN: [id number] About your hospitalization You were admitted on:  June 18, 2018 You last received care in the:  [facility] 4M POST SURG ORT 2 You were discharged on:  June 22, 2018 Why you were hospitalized Your primary diagnosis was:  Not on File Your diagnoses also included:  Primary Osteoarthritis Of Both Knees Follow-up Information Follow up With Details Comments Contact Info Roddie Lombard, MD   [address] [facility] [address] 
[phone] Discharge Orders None A check abdiel indicates which time of day the medication should be taken. My Medications START taking these medications Instructions Each Dose to Equal  
 Morning Noon Evening Bedtime  
 acetaminophen 500 mg tablet Commonly known as:  80 Dw[facility] Addison [facility] Your next dose is:  6 PM  
   
 Take 1-2 Tabs by mouth every six (6) hours as needed for Pain. Not to exceed 4,000mg in any 24 hour period  Indications: Pain 500-1000 mg  
    
   
   
   
  
 ondansetron 8 mg disintegrating tablet Commonly known as:  ZOFRAN ODT Your next dose is:  As Needed Take 0.5 Tabs by mouth every eight (8) hours as needed for Nausea. 4 mg  
    
   
   
   
  
 oxyCODONE IR 5 mg immediate release tablet Commonly known as:  Amrit Son Replaces:  oxyCODONE ER 20 mg ER tablet Your next dose is:  1 PM  
   
 Take 1-2 Tabs by mouth every four (4) hours as needed for Pain. Max Daily Amount: 60 mg. Indications: Pain 5-10 mg CHANGE how you take these medications Instructions Each Dose to Equal  
 Morning Noon Evening Bedtime * aspirin delayed-release 81 mg tablet What changed:  Another medication with the same name was added.  Make sure you understand how and when to take each. Notes to Patient:  Resume after 30 days when 325 aspirin twice daily completed. take 81 mg by mouth daily. 81 mg  
    
   
   
   
  
 * aspirin delayed-release 325 mg tablet What changed: You were already taking a medication with the same name, and this prescription was added. Make sure you understand how and when to take each. Your next dose is:  Tomorrow AM  
   
 Take 1 Tab by mouth two (2) times a day. 325 mg  
    
   
   
   
  
 * ibuprofen 800 mg tablet Commonly known as:  MOTRIN What changed:  Another medication with the same name was added. Make sure you understand how and when to take each. Your next dose is:  As Needed Take 800 mg by mouth every six (6) hours as needed for Pain. Indications: OSTEOARTHRITIS  
 800 mg  
    
   
   
   
  
 * ibuprofen 800 mg tablet Commonly known as:  MOTRIN What changed: You were already taking a medication with the same name, and this prescription was added. Make sure you understand how and when to take each. Notes to Patient:  Duplicate prescription - only take a total of 800mg every 6 hours as needed. Take 1 Tab by mouth every six (6) hours as needed for Pain. 800 mg  
    
   
   
   
  
 traMADol 50 mg tablet Commonly known as:  ULTRAM  
What changed:  reasons to take this Your next dose is:  As Needed Take 1 Tab by mouth every six (6) hours as needed for Pain (Take for breakthrough pain if Oxycodone is not working). Max Daily Amount: 200 mg. Indications: Pain, Post-op Pain, Diagnosis Hip and Knee Arthritis ICD 10 - M16.9  
 50 mg  
    
   
   
   
  
 VOLTAREN 1 % Gel Generic drug:  diclofenac What changed:  Another medication with the same name was removed. Continue taking this medication, and follow the directions you see here. Your next dose is:  Return to prior schedule Apply  to affected area four (4) times daily. * Notice: This list has 4 medication(s) that are the same as other medications prescribed for you. Read the directions carefully, and ask your doctor or other care provider to review them with you. CONTINUE taking these medications Instructions Each Dose to Equal  
 Morning Noon Evening Bedtime  
 b complex vitamins tablet Your next dose is:  Return to prior schedule Take 1 Tab by mouth daily. 1 Tab  
    
   
   
   
  
 glucosam-chond-msm-boron-hyal 500-66.7-500-2 mg Tab Your next dose is:  Return to prior schedule Take 1 Tab by mouth daily. 1 Tab  
    
   
   
   
  
 hydroCHLOROthiazide 25 mg tablet Commonly known as:  HYDRODIURIL Notes to Patient:  Have been holding d/t low BP  
   
 TAKE 1 TAB BY MOUTH DAILY FOR 30 DAYS. melatonin Tab tablet Your next dose is:  Return to prior schedule Take 5 mg by mouth nightly as needed. Indications: 2 tabltes 5 mg  
    
   
   
   
  
 multivitamin tablet Commonly known as:  ONE A DAY Your next dose is:  Return to prior schedule Take 1 Tab by mouth daily. 1 Tab  
    
   
   
   
  
 nicotinic acid 500 mg tablet Commonly known as:  NIACIN Take 500 mg by mouth Daily (before breakfast). 500 mg  
    
   
   
   
  
 OMEGA 3 PO Your next dose is:  Return to prior schedule Take 1 Cap by mouth daily. 1 Cap OTHER Juice plus for vegetable and fruit supplementation  
     
   
   
   
  
 simvastatin 40 mg tablet Commonly known as:  ZOCOR Your next dose is: Tonight PM  
   
 TAKE 1 TAB BY MOUTH NIGHTLY FOR 30 DAYS. STOP taking these medications   
 oxyCODONE ER 20 mg ER tablet Commonly known as:  OxyCONTIN Replaced by:  oxyCODONE IR 5 mg immediate release tablet  
   
  
 sulindac 200 mg tablet Commonly known as:  CLINORIL Where to Get Your Medications Information on where to get these meds will be given to you by the nurse or doctor. ! Ask your nurse or doctor about these medications  
  acetaminophen 500 mg tablet  
 aspirin delayed-release 325 mg tablet  
 ibuprofen 800 mg tablet  
 ondansetron 8 mg disintegrating tablet  
 oxyCODONE IR 5 mg immediate release tablet  
 traMADol 50 mg tablet Opioid Education Prescription Opioids: What You Need to Know: 
 
Prescription opioids can be used to help relieve moderate-to-severe pain and are often prescribed following a surgery or injury, or for certain health conditions. These medications can be an important part of treatment but also come with serious risks. Opioids are strong pain medicines. Examples include hydrocodone, oxycodone, fentanyl, and morphine. Heroin is an example of an illegal opioid. It is important to work with your health care provider to make sure you are getting the safest, most effective care. WHAT ARE THE RISKS AND SIDE EFFECTS OF OPIOID USE? Prescription opioids carry serious risks of addiction and overdose, especially with prolonged use. An opioid overdose, often marked by slow breathing, can cause sudden death. The use of prescription opioids can have a number of side effects as well, even when taken as directed. · Tolerance-meaning you might need to take more of a medication for the same pain relief · Physical dependence-meaning you have symptoms of withdrawal when the medication is stopped. Withdrawal symptoms can include nausea, sweating, chills, diarrhea, stomach cramps, and muscle aches. Withdrawal can last up to several weeks, depending on which drug you took and how long you took it. · Increased sensitivity to pain · Constipation · Nausea, vomiting, and dry mouth · Sleepiness and dizziness · Confusion · Depression · Low levels of testosterone that can result in lower sex drive, energy, and strength · Itching and sweating RISKS ARE GREATER WITH:      
· History of drug misuse, substance use disorder, or overdose · Mental health conditions (such as depression or anxiety) · Sleep apnea · Older age (72 years or older) · Pregnancy Avoid alcohol while taking prescription opioids. Also, unless specifically advised by your health care provider, medications to avoid include: · Benzodiazepines (such as Xanax or Valium) · Muscle relaxants (such as Soma or Flexeril) · Hypnotics (such as Ambien or Lunesta) · Other prescription opioids KNOW YOUR OPTIONS Talk to your health care provider about ways to manage your pain that don't involve prescription opioids. Some of these options may actually work better and have fewer risks and side effects. Options may include: 
· Pain relievers such as acetaminophen, ibuprofen, and naproxen · Some medications that are also used for depression or seizures · Physical therapy and exercise · Counseling to help patients learn how to cope better with triggers of pain and stress. · Application of heat or cold compress · Massage therapy · Relaxation techniques Be Informed Make sure you know the name of your medication, how much and how often to take it, and its potential risks & side effects. IF YOU ARE PRESCRIBED OPIOIDS FOR PAIN: 
· Never take opioids in greater amounts or more often than prescribed. Remember the goal is not to be pain-free but to manage your pain at a tolerable level. · Follow up with your primary care provider to: · Work together to create a plan on how to manage your pain. · Talk about ways to help manage your pain that don't involve prescription opioids. · Talk about any and all concerns and side effects. · Help prevent misuse and abuse. · Never sell or share prescription opioids · Help prevent misuse and abuse. · Store prescription opioids in a secure place and out of reach of others (this may include visitors, children, friends, and family). · Safely dispose of unused/unwanted prescription opioids: Find your community drug take-back program or your pharmacy mail-back program, or flush them down the toilet, following guidance from the Food and Drug Administration (www.fda.gov/Drugs/ResourcesForYou). · Visit www.cdc.gov/drugoverdose to learn about the risks of opioid abuse and overdose. · If you believe you may be struggling with addiction, tell your health care provider and ask for guidance or call [address] [place], [place] [address]rose [facility] at [phone]. Discharge Instructions TOTAL KNEE DISCHARGE INSTRUCTIONS Patient: Maryuri White MRN: [id number]  SSN: [id number] Please take the time to review the following instructions before you leave the hospital and use them as guidelines during your recovery from surgery. If you have any questions you may contact my office at [phone]  After business hours or during the weekend you can contact me at [phone] (cell phone) for emergency's. Please use the office number during regular business hours. SPECIAL INSTRUCTIONS :  
1. Full extension at the knee is the most important aspect of your range of motion. Avoid placing a pillow or bump behind the knee. Rather, place the heel up on a bump or pillow and allow gravity to help straighten the knee. 2. You may weight bear as tolerated on the knee and during the day you should bend the knee as much as possible. 3. Drainage from the incision more than 4 days from surgery is concerning. Contact my office if there is any question [phone]. Wound Care/ Dressing Changes: DRESSING :  
 
Aquacel Dressing : This may be removed by home health 7-10 days after the date of your surgery. If there is no drainage, then a simple dressing may be used or no dressing at all.  Other dressing options can be purchased over the counter at a local pharmacy or medical supply vendor. A porous adhesive dressing such as pictured above can be purchased at a local [facility] or [employer]. You only need to keep the incision covered for 7-10 days after showers. A dressing may be used for longer if there are issues with clothing clinging to the incision. Showering/ Bathing: You may shower with the aquacel dressing in place. This is left in place for 710 days following discharge from the hospital. If your incision is dry without drainage you may shower following your discharge home. After 7 days your aquacel dressing should be removed for showering. It is fine to have water run over the incision. Do not vigorously scrub your incision. Apply a clean, dry dressing after you have dried your incision. Do not take a bath or get into a swimming pool / [place] until you follow up with Dr. Daxa Cadena. Do not soak your incision under water. If there is continued drainage or you are concerned contact Dr Mary Ellis office prior to showering [phone]. Diet: 
You may advance to your regular diet as tolerated. Increase your clear liquid intake for the next 2-3 days. Medication: 
 
 
1. You will be given prescriptions for pain medication when you are discharged from the hospital. The side effects of these medications can be substantial and the narcotic medications are not mandatory. You may substitute these medications with Tylenol or Alleve / Motrin. 2.  Please use the medications as prescribed. Pain medications may cause constipation- Colace twice daily and Miralax one scoop daily while taking the narcotic medication should help prevent constipation. Please discuss with your local pharmacist regarding increasing this dosage if constipation persists. Other possible side effects of pain medication are dizziness, headache, nausea, vomiting, and urinary retention.   Discontinue the pain medication if you develop itching, rash, shortness of breath, or difficulties swallowing. If these symptoms become severe or are not relieved by discontinuing the medication, you should seek immediate medical attention. 3. Refills of pain medication are authorized during office hours only (8 AM- 5 PM  Monday thru Friday). Many of these medication will require you or a family member to pick-up a physical prescription at the office. 4. Medications other than antiinflammatories will not be called into the pharmacy after business hours. 5. You may resume the medication(s) you were taking prior to your surgery. Narcotics may change the effects of some antidepressant medication(s). If you have any questions about possible interactions between your regular medications and the pain medication, you should ask the pharmacist or contact the prescribing physician. 6. If you have constipation which is not improved by oral stool softeners then a Ducolax suppository should be purchased over the counter. 7. Continue the blood thinner (Aspirin or Lovenox) for a total of 30 days following surgery. Follow up appointment: 
 
Please call our office at [phone] for your follow up appointment. This should be scheduled 14 days following the date of surgery. Physical Therapy / Nursing: 
 
Physical Therapy following surgery will be arranged at home along with at home nursing care. They have specific instructions for rehab and wound care. .  
 
Returning to work: 
 
Normal return to work is 3-12 weeks following total knee replacement. Depending on your progression following surgery and specific job duties you may take longer for a full return to work. DRIVING You should not return to driving until you are off all narcotic pain medications and able to safely and quickly apply the brakes. This is normally 3-4 weeks for left knees and 4-6 weeks for right knees. Important Signs and Symptoms: If any of the following signs or symptoms occur, you should contact Dr. Marcelle Li office. Please be advised if a problem arises which you feel requires immediate medical attention or you are unable to contact Dr. Marcelle Li office you should seek immediate medical attention at the ER or other health care facility you have access to. 
 
1. A sudden increase in swelling and/or redness or warmth at the area your surgery was performed which isnt relieved by rest, ice, and elevation. 2. Oral temperature greater than 101 degrees for 12 hours or more which isnt relieved by an increase in fluid intake and taking 2 Tylenol every 4-6 hours. 3. Excessive drainage from your incisions, or drainage which hasnt stopped by 72 hours after your surgery. 4. Fever, chills, shortness of breath, chest pain, nausea, vomiting or other signs and symptoms which are of concern to you. frequently asked questions ? What should I take for pain? 
o In general you will be discharged with three medications for pain (Extra Strength Tylenol, Oxycodone 5mg and Tramadol). This may vary slightly depending on what you were taking in the hospital. USE ICE regularly, this is the most important modality for controlling pain. ? 1st Line  Extra Strength Tylenol 1-2 tablets (500-1000mg) every 4-6 hrs. 
? After 2 days this dose should not exceed 8 tablets per day ? This is the first and only medication you need to take. Initially you may need 2 tablets every 4 hours, but as your pain subsides, this will taper to 1 tablet every 6 hours. ? 2nd Line - Tramadol 50mg (1 tablet) every 8 hours ? 3rd Line  Oxycodone 1 (5mg) - 2 (10mg) every 4 hours (Or as directed), take these between Percocet doses if your pain is not below 4 / 10. This may be needed only for several days following your discharge. ? 4th Line  Add Alleve 220mg every 12 hours or Motrin 400mg (200mg x 2) every 8 hours ? When should I call for advice regarding my pain? o After 12 hours on the above regimen, if nothing is working call the office ([phone] or [phone]) or call my cell after hours [phone]. 
? Can I get refills? 
o Narcotic refills are provided for the first 6 weeks following surgery. ? I will generally try to taper down to a single narcotic medication by your two week appointment. o Try Tylenol 650mg along with Alleve 220mg or Motrin 200mg during the majority of the day. ? Save the narcotic pain medications for physical therapy (1 hour prior) and before sleeping at night. ? Keep in mind you need to discontinue these medications prior to returning to driving. ? Is swelling normal? 
o Almost everyone has some degree of swelling following surgery. o Following hip and knee replacement surgery, swelling can be normal below the incision for the first 1-2 weeks. ? This swelling peaks around 5-7 days after surgery. ? You may have some bruising around the back of the thigh, calf and even into the foot. ? What should I do for the swelling? 
o Keep the limb elevated. o Apply compression socks (knee high for total knees and up to the mid-thigh for total hips.  
o Heat or ice may be applied, choose the modality that makes you the most comfortable. ? How long should I remain on blood thinners following surgery? 
o Thirty days ? When can I drive? 
o Once you have stopped using regular narcotic pain medications (Percocet, Lortab, etc.) and can safely apply the brakes without hesitation. ? When can I shower? o 72hours following surgery if the incision is dry. 
o No submersion of the incision, bathing or swimming for 14 days following surgery or until cleared by Dr Ken Padron. ? What do I do with the dressing when I shower? 
o The dressing can be removed. o The incision is sealed with Dermabond (Biologic glue) and except for wounds which are draining should be watertight. ? How active should I be following surgery? o Progress activities in moderation at your own pace.  
o Walk each day and set progressive goals with small increments (1st week  ½ block of walking, 2nd week  1 block, 3rd week  2 blocks, etc.) Please do not hesitate to call me at [phone] (cell phone) for questions following surgery - MD Samara Hoffman MD 
Cell [phone] John Holt PA-C Cell [phone] Medical Staff : Miguelina Handy Office : [phone] Nutrition Recommendations for Discharge:          
 
Continue Oral Nutrition Supplements at discharge: Ensure Active High Protein for Muscle Health or similar product  Once daily for 30 days unless otherwise directed by your Primary Care Physician. This product can be purchased at your local grocery store or drug store and online. Carley Nieto RD, MS, CDE Introducing [facility]! Dear Elvin Turner: Thank you for requesting a [employer] account. Our records indicate that you already have an active [employer] account. You can access your account anytime at [email or web address] Did you know that you can access your hospital and ER discharge instructions at any time in [employer]? You can also review all of your test results from your hospital stay or ER visit. Additional Information If you have questions, please visit the Frequently Asked Questions section of the [employer] website at [email or web address] Remember, [employer] is NOT to be used for urgent needs. For medical emergencies, dial 911. Now available from your iPhone and Android! Introducing Torin Hernandez As a [facility] patient, I wanted to make you aware of our electronic visit tool called Torin Hernandez. [facility] 24/7 allows you to connect within minutes with a medical provider 24 hours a day, seven days a week via a mobile device or tablet or logging into a secure website from your computer. You can access [employer] from anywhere in the [place]. A virtual visit might be right for you when you have a simple condition and feel like you just dont want to get out of bed, or cant get away from work for an appointment, when your regular [employer] provider is not available (evenings, weekends or holidays), or when youre out of town and need minor care. Electronic visits cost only $49 and if the [employer] 24/7 provider determines a prescription is needed to treat your condition, one can be electronically transmitted to a nearby pharmacy*. Please take a moment to enroll today if you have not already done so. The enrollment process is free and takes just a few minutes. To enroll, please download the [employer] 24/7 alan to your tablet or phone, or visit [email or web address] to enroll on your computer. And, as an [address] [place] [address] patient with a [employer] account, the results of your visits will be scanned into your electronic medical record and your primary care provider will be able to view the scanned results. We urge you to continue to see your regular [employer] provider for your ongoing medical care. And while your primary care provider may not be the one available when you seek a [employer] virtual visit, the peace of mind you get from getting a real diagnosis real time can be priceless. For more information on [employer], view our Frequently Asked Questions (FAQs) at [email or web address]. Sincerely, 
 
Siddharth Santos MD 
Chief Medical Officer [facility] Tiki Osuna *:  certain medications cannot be prescribed via [employer] Unresulted tests-please follow up with your PCP on these results Procedure/Test Authorizing Provider  [address] Calvin Ruiz PA-C  
 HEMOGLOBIN Cristobal Wright PA-C  
 METABOLIC PANEL, BASIC Cristobal Wright PA-C  
 XR KNEE LT MAX 2 VWS Marlen Repress, PA-C  
 XR KNEE RT MAX 2 VWS Marlen Repress, PA-C Providers Seen During Your Hospitalization Provider Specialty Primary office phone Nash Levi MD Orthopedic Surgery [phone] Your Primary Care Physician (PCP) Primary Care Physician Office Phone Office Fax [place], [address] [phone] You are allergic to the following No active allergies Recent Documentation Height Weight Breastfeeding? BMI OB Status Smoking Status 1.676 m 81.3 kg No 28.93 kg/m2 Postmenopausal Never Smoker Emergency Contacts Name Discharge Info Relation Home Work Mobile Leta Huston/Marco DISCHARGE CAREGIVER [3] Sister [23] [phone] [facility] DISCHARGE CAREGIVER [3] Sister [23] [phone] Patient Belongings The following personal items are in your possession at time of discharge: 
  Dental Appliances: None  Visual Aid: Glasses             Clothing:  (denies valuables. clothing to locker) Please provide this summary of care documentation to your next provider. Signatures-by signing, you are acknowledging that this After Visit Summary has been reviewed with you and you have received a copy. Patient Signature:  ____________________________________________________________ Date:  ____________________________________________________________  
  
[facility] Provider Signature:  ____________________________________________________________ Date:  ____________________________________________________________

## 2018-06-18 NOTE — IP AVS SNAPSHOT
[facility] 
 
 
 [address] 
[phone] Patient: Jayshree Morrissey MRN: [id number] RNC:[id number] A check abdiel indicates which time of day the medication should be taken. My Medications START taking these medications Instructions Each Dose to Equal  
 Morning Noon Evening Bedtime  
 acetaminophen 500 mg tablet Commonly known as:  [address] [facility] Your next dose is:  6 PM  
   
 Take 1-2 Tabs by mouth every six (6) hours as needed for Pain. Not to exceed 4,000mg in any 24 hour period  Indications: Pain 500-1000 mg  
    
   
   
   
  
 ondansetron 8 mg disintegrating tablet Commonly known as:  ZOFRAN ODT Your next dose is:  As Needed Take 0.5 Tabs by mouth every eight (8) hours as needed for Nausea. 4 mg  
    
   
   
   
  
 oxyCODONE IR 5 mg immediate release tablet Commonly known as:  Seldon Yakov Replaces:  oxyCODONE ER 20 mg ER tablet Your next dose is:  1 PM  
   
 Take 1-2 Tabs by mouth every four (4) hours as needed for Pain. Max Daily Amount: 60 mg. Indications: Pain 5-10 mg CHANGE how you take these medications Instructions Each Dose to Equal  
 Morning Noon Evening Bedtime * aspirin delayed-release 81 mg tablet What changed:  Another medication with the same name was added. Make sure you understand how and when to take each. Notes to Patient:  Resume after 30 days when 325 aspirin twice daily completed. take 81 mg by mouth daily. 81 mg  
    
   
   
   
  
 * aspirin delayed-release 325 mg tablet What changed: You were already taking a medication with the same name, and this prescription was added. Make sure you understand how and when to take each. Your next dose is:  Tomorrow AM  
   
 Take 1 Tab by mouth two (2) times a day. 325 mg  
    
   
   
   
  
 * ibuprofen 800 mg tablet Commonly known as:  MOTRIN  
 What changed:  Another medication with the same name was added. Make sure you understand how and when to take each. Your next dose is:  As Needed Take 800 mg by mouth every six (6) hours as needed for Pain. Indications: OSTEOARTHRITIS  
 800 mg  
    
   
   
   
  
 * ibuprofen 800 mg tablet Commonly known as:  MOTRIN What changed: You were already taking a medication with the same name, and this prescription was added. Make sure you understand how and when to take each. Notes to Patient:  Duplicate prescription - only take a total of 800mg every 6 hours as needed. Take 1 Tab by mouth every six (6) hours as needed for Pain. 800 mg  
    
   
   
   
  
 traMADol 50 mg tablet Commonly known as:  ULTRAM  
What changed:  reasons to take this Your next dose is:  As Needed Take 1 Tab by mouth every six (6) hours as needed for Pain (Take for breakthrough pain if Oxycodone is not working). Max Daily Amount: 200 mg. Indications: Pain, Post-op Pain, Diagnosis Hip and Knee Arthritis ICD 10 - M16.9  
 50 mg  
    
   
   
   
  
 VOLTAREN 1 % Gel Generic drug:  diclofenac What changed:  Another medication with the same name was removed. Continue taking this medication, and follow the directions you see here. Your next dose is:  Return to prior schedule Apply  to affected area four (4) times daily. * Notice: This list has 4 medication(s) that are the same as other medications prescribed for you. Read the directions carefully, and ask your doctor or other care provider to review them with you. CONTINUE taking these medications Instructions Each Dose to Equal  
 Morning Noon Evening Bedtime  
 b complex vitamins tablet Your next dose is:  Return to prior schedule Take 1 Tab by mouth daily. 1 Tab  
    
   
   
   
  
 glucosam-chond-msm-boron-hyal 500-66.7-500-2 mg Tab Your next dose is:  Return to prior schedule Take 1 Tab by mouth daily. 1 Tab  
    
   
   
   
  
 hydroCHLOROthiazide 25 mg tablet Commonly known as:  HYDRODIURIL Notes to Patient:  Have been holding d/t low BP  
   
 TAKE 1 TAB BY MOUTH DAILY FOR 30 DAYS. melatonin Tab tablet Your next dose is:  Return to prior schedule Take 5 mg by mouth nightly as needed. Indications: 2 tabltes 5 mg  
    
   
   
   
  
 multivitamin tablet Commonly known as:  ONE A DAY Your next dose is:  Return to prior schedule Take 1 Tab by mouth daily. 1 Tab  
    
   
   
   
  
 nicotinic acid 500 mg tablet Commonly known as:  NIACIN Take 500 mg by mouth Daily (before breakfast). 500 mg  
    
   
   
   
  
 OMEGA 3 PO Your next dose is:  Return to prior schedule Take 1 Cap by mouth daily. 1 Cap OTHER Juice plus for vegetable and fruit supplementation  
     
   
   
   
  
 simvastatin 40 mg tablet Commonly known as:  ZOCOR Your next dose is: Tonight PM  
   
 TAKE 1 TAB BY MOUTH NIGHTLY FOR 30 DAYS. STOP taking these medications   
 oxyCODONE ER 20 mg ER tablet Commonly known as:  OxyCONTIN Replaced by:  oxyCODONE IR 5 mg immediate release tablet  
   
  
 sulindac 200 mg tablet Commonly known as:  CLINORIL Where to Get Your Medications Information on where to get these meds will be given to you by the nurse or doctor. ! Ask your nurse or doctor about these medications  
  acetaminophen 500 mg tablet  
 aspirin delayed-release 325 mg tablet  
 ibuprofen 800 mg tablet  
 ondansetron 8 mg disintegrating tablet  
 oxyCODONE IR 5 mg immediate release tablet  
 traMADol 50 mg tablet

## 2018-06-18 NOTE — DISCHARGE INSTRUCTIONS
TOTAL KNEE DISCHARGE INSTRUCTIONS      Patient: Ton Chowdhury MRN: [id number]  SSN: [id number]              Please take the time to review the following instructions before you leave the hospital and use them as guidelines during your recovery from surgery. If you have any questions you may contact my office at [phone]  After business hours or during the weekend you can contact me at [phone] (cell phone) for emergency's. Please use the office number during regular business hours. SPECIAL INSTRUCTIONS :   1. Full extension at the knee is the most important aspect of your range of motion. Avoid placing a pillow or bump behind the knee. Rather, place the heel up on a bump or pillow and allow gravity to help straighten the knee. 2. You may weight bear as tolerated on the knee and during the day you should bend the knee as much as possible. 3. Drainage from the incision more than 4 days from surgery is concerning. Contact my office if there is any question [phone]. Wound Care/ Dressing Changes:    DRESSING :     Aquacel Dressing : This may be removed by home health 7-10 days after the date of your surgery. If there is no drainage, then a simple dressing may be used or no dressing at all. Other dressing options can be purchased over the counter at a local pharmacy or medical supply vendor. A porous adhesive dressing such as pictured above can be purchased at a local [employer] or [employer]. You only need to keep the incision covered for 7-10 days after showers. A dressing may be used for longer if there are issues with clothing clinging to the incision. Showering/ Bathing: You may shower with the aquacel dressing in place. This is left in place for 710 days following discharge from the hospital. If your incision is dry without drainage you may shower following your discharge home. After 7 days your aquacel dressing should be removed for showering.  It is fine to have water run over the incision. Do not vigorously scrub your incision. Apply a clean, dry dressing after you have dried your incision. Do not take a bath or get into a swimming pool / [facility] until you follow up with Dr. Arsenio Tijerina. Do not soak your incision under water. If there is continued drainage or you are concerned contact Dr Xena Aanya office prior to showering [phone]. Diet:  You may advance to your regular diet as tolerated. Increase your clear liquid intake for the next 2-3 days. Medication:      1. You will be given prescriptions for pain medication when you are discharged from the hospital. The side effects of these medications can be substantial and the narcotic medications are not mandatory. You may substitute these medications with Tylenol or Alleve / Motrin. 2.  Please use the medications as prescribed. Pain medications may cause constipation- Colace twice daily and Miralax one scoop daily while taking the narcotic medication should help prevent constipation. Please discuss with your local pharmacist regarding increasing this dosage if constipation persists. Other possible side effects of pain medication are dizziness, headache, nausea, vomiting, and urinary retention. Discontinue the pain medication if you develop itching, rash, shortness of breath, or difficulties swallowing. If these symptoms become severe or are not relieved by discontinuing the medication, you should seek immediate medical attention. 3. Refills of pain medication are authorized during office hours only (8 AM- 5 PM  Monday thru Friday). Many of these medication will require you or a family member to pick-up a physical prescription at the office. 4. Medications other than antiinflammatories will not be called into the pharmacy after business hours. 5. You may resume the medication(s) you were taking prior to your surgery. Narcotics may change the effects of some antidepressant medication(s).   If you have any questions about possible interactions between your regular medications and the pain medication, you should ask the pharmacist or contact the prescribing physician. 6. If you have constipation which is not improved by oral stool softeners then a Ducolax suppository should be purchased over the counter. 7. Continue the blood thinner (Aspirin or Lovenox) for a total of 30 days following surgery. Follow up appointment:    Please call our office at [phone] for your follow up appointment. This should be scheduled 14 days following the date of surgery. Physical Therapy / Nursing:    Physical Therapy following surgery will be arranged at home along with at home nursing care. They have specific instructions for rehab and wound care. .     Returning to work:    Normal return to work is 3-12 weeks following total knee replacement. Depending on your progression following surgery and specific job duties you may take longer for a full return to work. DRIVING    You should not return to driving until you are off all narcotic pain medications and able to safely and quickly apply the brakes. This is normally 3-4 weeks for left knees and 4-6 weeks for right knees. Important Signs and Symptoms:    If any of the following signs or symptoms occur, you should contact Dr. Judson Hunt office. Please be advised if a problem arises which you feel requires immediate medical attention or you are unable to contact Dr. Judson Hunt office you should seek immediate medical attention at the ER or other health care facility you have access to.    1. A sudden increase in swelling and/or redness or warmth at the area your surgery was performed which isnt relieved by rest, ice, and elevation. 2. Oral temperature greater than 101 degrees for 12 hours or more which isnt relieved by an increase in fluid intake and taking 2 Tylenol every 4-6 hours.   3. Excessive drainage from your incisions, or drainage which hasnt stopped by 72 hours after your surgery. 4. Fever, chills, shortness of breath, chest pain, nausea, vomiting or other signs and symptoms which are of concern to you. frequently asked questions   What should I take for pain?  o In general you will be discharged with three medications for pain (Extra Strength Tylenol, Oxycodone 5mg and Tramadol). This may vary slightly depending on what you were taking in the hospital. USE ICE regularly, this is the most important modality for controlling pain. - 1st Line - Extra Strength Tylenol 1-2 tablets (500-1000mg) every 4-6 hrs.  After 2 days this dose should not exceed 8 tablets per day   This is the first and only medication you need to take. Initially you may need 2 tablets every 4 hours, but as your pain subsides, this will taper to 1 tablet every 6 hours. - 2nd Line - Tramadol 50mg (1 tablet) every 8 hours  - 3rd Line - Oxycodone 1 (5mg) - 2 (10mg) every 4 hours (Or as directed), take these between Percocet doses if your pain is not below 4 / 10. This may be needed only for several days following your discharge. - 4th Line - Add Alleve 220mg every 12 hours or Motrin 400mg (200mg x 2) every 8 hours   When should I call for advice regarding my pain?  o After 12 hours on the above regimen, if nothing is working call the office ([phone] or [phone]) or call my cell after hours [phone].  Can I get refills?  o Narcotic refills are provided for the first 6 weeks following surgery. - I will generally try to taper down to a single narcotic medication by your two week appointment. o Try Tylenol 650mg along with Alleve 220mg or Motrin 200mg during the majority of the day. - Save the narcotic pain medications for physical therapy (1 hour prior) and before sleeping at night. - Keep in mind you need to discontinue these medications prior to returning to driving.     Is swelling normal?  o Almost everyone has some degree of swelling following surgery. o Following hip and knee replacement surgery, swelling can be normal below the incision for the first 1-2 weeks. - This swelling peaks around 5-7 days after surgery.   - You may have some bruising around the back of the thigh, calf and even into the foot.  What should I do for the swelling?  o Keep the limb elevated. o Apply compression socks (knee high for total knees and up to the mid-thigh for total hips.   o Heat or ice may be applied, choose the modality that makes you the most comfortable.  How long should I remain on blood thinners following surgery?  o Thirty days   When can I drive?  o Once you have stopped using regular narcotic pain medications (Percocet, Lortab, etc.) and can safely apply the brakes without hesitation.  When can I shower? o 72hours following surgery if the incision is dry.  o No submersion of the incision, bathing or swimming for 14 days following surgery or until cleared by Dr Luther Corrales.  What do I do with the dressing when I shower?  o The dressing can be removed. o The incision is sealed with Dermabond (Biologic glue) and except for wounds which are draining should be watertight.  How active should I be following surgery? o Progress activities in moderation at your own pace.   o Walk each day and set progressive goals with small increments (1st week - ½ block of walking, 2nd week - 1 block, 3rd week - 2 blocks, etc.)    Please do not hesitate to call me at [phone] (cell phone) for questions following surgery - MD Wilfredo Santiago MD  Cell [phone]  El Willett PA-C  Cell [phone]  Medical Staff : Ravi Christianson  Office : [phone]       Nutrition Recommendations for Discharge:             Continue Oral Nutrition Supplements at discharge:   Ensure Active High Protein for Muscle Health or similar product  Once daily for 30 days unless otherwise directed by your Primary Care Physician.  This product can be purchased at your local grocery store or drug store and online.                                                              Faisal Plummer, RD, MS, CDE

## 2018-06-18 NOTE — ANESTHESIA POSTPROCEDURE EVALUATION
Post-Anesthesia Evaluation and Assessment    Patient: Franchesca Hurt MRN: [id number]  SSN: [id number]    YOB: 1950  Age: 77 y.o. Sex: female       Cardiovascular Function/Vital Signs  Visit Vitals    /59    Pulse 68    Temp 37.8 °C (100 °F)    Resp 9    Ht 5' 6\" (1.676 m)    Wt 81.3 kg (179 lb 3.7 oz)    SpO2 100%    BMI 28.93 kg/m2       Patient is status post spinal, regional, MAC anesthesia for Procedure(s):  BILATERAL TOTAL KNEE REPLACEMENTS. Nausea/Vomiting: None    Postoperative hydration reviewed and adequate. Pain:  Pain Scale 1: Numeric (0 - 10) (06/18/18 1602)  Pain Intensity 1: 0 (06/18/18 1602)   Managed    Neurological Status:   Neuro (WDL): Exceptions to WDL (06/18/18 1602)  Neuro  Neurologic State: Drowsy (06/18/18 1602)  LUE Motor Response: Purposeful (06/18/18 1602)  LLE Motor Response: No movement to any stimulus;Numbness; Pharmacologically paralyzed (06/18/18 1602)  RUE Motor Response: Purposeful (06/18/18 1602)  RLE Motor Response: No movement to any stimulus;Numbness; Pharmacologically paralyzed (06/18/18 1602)   At baseline    Mental Status and Level of Consciousness: Arousable    Pulmonary Status:   O2 Device: Nasal cannula (06/18/18 1602)   Adequate oxygenation and airway patent    Complications related to anesthesia: None    Post-anesthesia assessment completed.  No concerns    Signed By: Consuelo Pickens MD     June 18, 2018

## 2018-06-18 NOTE — ANESTHESIA PREPROCEDURE EVALUATION
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    H/O HEPATITIS A  contracted while working in [place]    H/o shingles (ocular involvement) 8/17           Physical Exam    Airway  Mallampati: II  TM Distance: 4 - 6 cm  Neck ROM: normal range of motion   Mouth opening: Normal     Cardiovascular  Regular rate and rhythm,  S1 and S2 normal,  no murmur, click, rub, or gallop  Rhythm: regular  Rate: normal         Dental  No notable dental hx       Pulmonary  Breath sounds clear to auscultation               Abdominal  GI exam deferred       Other Findings            Anesthetic Plan    ASA: 2  Anesthesia type: spinal and regional - femoral single shot and popliteal fossa block          Induction: Intravenous  Anesthetic plan and risks discussed with: Patient

## 2018-06-18 NOTE — ANESTHESIA PROCEDURE NOTES
Spinal Block    Start time: 6/18/2018 12:49 PM  End time: 6/18/2018 1:10 PM  Performed by: Andrea Heller  Authorized by: Raul Isabel     Pre-procedure: Indications: at surgeon's request and primary anesthetic  Preanesthetic Checklist: patient identified, risks and benefits discussed, anesthesia consent, site marked, patient being monitored and timeout performed    Timeout Time: 12:49          Spinal Block:   Patient Position:  Seated  Prep Region:  Lumbar  Prep: DuraPrep      Location:  L3-4  Technique:  Single shot  Local:  Lidocaine 1%  Local Dose (mL):  2    Needle:   Needle Type:  Pencan  Needle Gauge:  25 G  Attempts:  3      Events: CSF confirmed, no blood with aspiration and no paresthesia        Assessment:  Insertion:  Uncomplicated  Patient tolerance:  Patient tolerated the procedure well with no immediate complications  Johnetta Lefort, SRNA attempted spinal under supervision of Marce Sands CRNA.   Marce Sands CRNA attempted and Dr Aster Muller was able to access and dose spinal.

## 2018-06-18 NOTE — ANESTHESIA PROCEDURE NOTES
Peripheral Block    Start time: 6/18/2018 12:24 PM  End time: 6/18/2018 12:46 PM  Performed by: Jackie Lamar  Authorized by: Jackie Lamar       Pre-procedure: Indications: at surgeon's request and post-op pain management    Preanesthetic Checklist: patient identified, risks and benefits discussed, site marked, timeout performed, anesthesia consent given and patient being monitored    Timeout Time: 12:24          Block Type:   Block Type:  Popliteal and femoral single shot  Laterality:  Right and left  Monitoring:  Continuous pulse ox, frequent vital sign checks, heart rate and responsive to questions  Injection Technique:  Single shot  Procedures: ultrasound guided and nerve stimulator    Patient Position: supine  Prep: chlorhexidine    Location:  Upper thigh  Needle Type:  Stimuplex  Needle Gauge:  22 G  Needle Localization:  Nerve stimulator and ultrasound guidance  Medication Injected:  0.2%  ropivacaine  Volume (mL):  20    Assessment:  Number of attempts:  1  Injection Assessment:  Incremental injection every 5 mL, local visualized surrounding nerve on ultrasound, negative aspiration for blood, no paresthesia and no intravascular symptoms  Patient tolerance:  Patient tolerated the procedure well with no immediate complications  Popliteal block done under ultrasound guidance and nerve stimulation with incremental injection of Ropivacaine 0.2% 20 cc using a 21 G Stimuplex needle  Jud Coley MD performed left side blocks  CHATO Oquendo performed right side blocks under supervision of Estefanía Velez CRNA.

## 2018-06-19 LAB
ANION GAP SERPL CALC-SCNC: 4 MMOL/L (ref 5–15)
BUN SERPL-MCNC: 8 MG/DL (ref 6–20)
BUN/CREAT SERPL: 12 (ref 12–20)
CALCIUM SERPL-MCNC: 9 MG/DL (ref 8.5–10.1)
CHLORIDE SERPL-SCNC: 108 MMOL/L (ref 97–108)
CO2 SERPL-SCNC: 28 MMOL/L (ref 21–32)
CREAT SERPL-MCNC: 0.69 MG/DL (ref 0.55–1.02)
GLUCOSE SERPL-MCNC: 107 MG/DL (ref 65–100)
HGB BLD-MCNC: 10.3 G/DL (ref 11.5–16)
POTASSIUM SERPL-SCNC: 4.3 MMOL/L (ref 3.5–5.1)
SODIUM SERPL-SCNC: 140 MMOL/L (ref 136–145)

## 2018-06-19 PROCEDURE — 74011250637 HC RX REV CODE- 250/637: Performed by: NURSE PRACTITIONER

## 2018-06-19 PROCEDURE — 97165 OT EVAL LOW COMPLEX 30 MIN: CPT

## 2018-06-19 PROCEDURE — 74011250636 HC RX REV CODE- 250/636: Performed by: PHYSICIAN ASSISTANT

## 2018-06-19 PROCEDURE — 80048 BASIC METABOLIC PNL TOTAL CA: CPT | Performed by: PHYSICIAN ASSISTANT

## 2018-06-19 PROCEDURE — 36415 COLL VENOUS BLD VENIPUNCTURE: CPT | Performed by: PHYSICIAN ASSISTANT

## 2018-06-19 PROCEDURE — 74011250637 HC RX REV CODE- 250/637: Performed by: PHYSICIAN ASSISTANT

## 2018-06-19 PROCEDURE — 85018 HEMOGLOBIN: CPT | Performed by: PHYSICIAN ASSISTANT

## 2018-06-19 PROCEDURE — 97161 PT EVAL LOW COMPLEX 20 MIN: CPT

## 2018-06-19 PROCEDURE — 77030038269 HC DRN EXT URIN PURWCK BARD -A

## 2018-06-19 PROCEDURE — 97530 THERAPEUTIC ACTIVITIES: CPT

## 2018-06-19 PROCEDURE — 65270000029 HC RM PRIVATE

## 2018-06-19 PROCEDURE — 97110 THERAPEUTIC EXERCISES: CPT

## 2018-06-19 RX ORDER — SIMVASTATIN 20 MG/1
40 TABLET, FILM COATED ORAL
Status: DISCONTINUED | OUTPATIENT
Start: 2018-06-19 | End: 2018-06-22 | Stop reason: HOSPADM

## 2018-06-19 RX ORDER — HYDROCHLOROTHIAZIDE 25 MG/1
25 TABLET ORAL
Status: DISCONTINUED | OUTPATIENT
Start: 2018-06-19 | End: 2018-06-20

## 2018-06-19 RX ADMIN — SODIUM CHLORIDE 125 ML/HR: 900 INJECTION, SOLUTION INTRAVENOUS at 14:45

## 2018-06-19 RX ADMIN — OXYCODONE HYDROCHLORIDE 10 MG: 5 TABLET ORAL at 08:20

## 2018-06-19 RX ADMIN — Medication 10 ML: at 13:09

## 2018-06-19 RX ADMIN — ASPIRIN 325 MG: 325 TABLET, DELAYED RELEASE ORAL at 18:08

## 2018-06-19 RX ADMIN — SENNOSIDES AND DOCUSATE SODIUM 1 TABLET: 8.6; 5 TABLET ORAL at 18:08

## 2018-06-19 RX ADMIN — ACETAMINOPHEN 650 MG: 325 TABLET ORAL at 03:53

## 2018-06-19 RX ADMIN — Medication 2 G: at 13:09

## 2018-06-19 RX ADMIN — OXYCODONE HYDROCHLORIDE 10 MG: 5 TABLET ORAL at 11:17

## 2018-06-19 RX ADMIN — CELECOXIB 200 MG: 100 CAPSULE ORAL at 08:20

## 2018-06-19 RX ADMIN — FAMOTIDINE 20 MG: 20 TABLET ORAL at 08:19

## 2018-06-19 RX ADMIN — Medication 10 ML: at 03:54

## 2018-06-19 RX ADMIN — SENNOSIDES AND DOCUSATE SODIUM 1 TABLET: 8.6; 5 TABLET ORAL at 08:19

## 2018-06-19 RX ADMIN — SODIUM CHLORIDE 125 ML/HR: 900 INJECTION, SOLUTION INTRAVENOUS at 00:54

## 2018-06-19 RX ADMIN — SIMVASTATIN 40 MG: 20 TABLET, FILM COATED ORAL at 21:25

## 2018-06-19 RX ADMIN — POLYETHYLENE GLYCOL (3350) 17 G: 17 POWDER, FOR SOLUTION ORAL at 08:22

## 2018-06-19 RX ADMIN — ACETAMINOPHEN 650 MG: 325 TABLET ORAL at 18:08

## 2018-06-19 RX ADMIN — FAMOTIDINE 20 MG: 20 TABLET ORAL at 18:08

## 2018-06-19 RX ADMIN — Medication 2 G: at 03:53

## 2018-06-19 RX ADMIN — ACETAMINOPHEN 650 MG: 325 TABLET ORAL at 13:09

## 2018-06-19 RX ADMIN — OXYCODONE HYDROCHLORIDE 5 MG: 5 TABLET ORAL at 21:25

## 2018-06-19 RX ADMIN — OXYCODONE HYDROCHLORIDE 10 MG: 5 TABLET ORAL at 14:47

## 2018-06-19 RX ADMIN — PROCHLORPERAZINE EDISYLATE 5 MG: 5 INJECTION INTRAMUSCULAR; INTRAVENOUS at 04:03

## 2018-06-19 RX ADMIN — CELECOXIB 200 MG: 100 CAPSULE ORAL at 21:25

## 2018-06-19 RX ADMIN — ASPIRIN 325 MG: 325 TABLET, DELAYED RELEASE ORAL at 08:20

## 2018-06-19 RX ADMIN — OXYCODONE HYDROCHLORIDE 10 MG: 5 TABLET ORAL at 18:08

## 2018-06-19 RX ADMIN — SODIUM CHLORIDE 500 ML: 900 INJECTION, SOLUTION INTRAVENOUS at 18:07

## 2018-06-19 NOTE — PROGRESS NOTES
Bedside and Verbal shift change report given to Rock (oncoming nurse) by Orion Cruz (offgoing nurse).  Report included the following information SBAR, Kardex and Recent Results.

## 2018-06-19 NOTE — PROGRESS NOTES
Shift Summary    0700  Bedside and Verbal shift change report given to Андрей Nguyen RN (oncoming nurse) by Gabo Lizarraga (offgoing nurse). Report included the following information SBAR, Kardex, MAR, Recent Results and Cardiac Rhythm  . NP Aliza Bell notified of  BP with therapy. New orders for 500 mL bolus . BP rechecked WNL and documented. Patient in bed resting quietly. 1900  Bedside and Verbal shift change report given to [address] [place] [address] (oncoming nurse) by Андрей Nguyen RN (offgoing nurse). Report included the following information SBAR, Kardex, MAR and Recent Results.

## 2018-06-19 NOTE — ACP (ADVANCE CARE PLANNING)
Comments: Responded to Advance Medical Directive (AMD) request from in [facility] [address]. Miss Herberth Shine is known to me from my time here with [facility]. She shared she had done a 5 Wishes years ago. Explained that that document could be used if she knows where it is. She has 4 siblings. Jim Glover shared her sister who is coming by today would probably be the best choice for her decision maker. Left and AMD form incase she needed it should she not be able to locate her 5 wishes. Father Harjeet Anderson came in during visit affirming her Holiness tim. Provided continued spiritual p[resence and assurance of prayer.   Visited by: Maeve Velasco [address] ([id number])

## 2018-06-19 NOTE — PROGRESS NOTES
Provided pastoral care visit to [facility] 5 patient. Did not include sacramental care.     Ton Fernandez

## 2018-06-19 NOTE — PROGRESS NOTES
6/19/2018 12:32 PM Case management consult for discharge planning received. Met with pt and pt's sister. Charted address and phone number confirmed. Reason for Admission: bilateral knee replacements                     RRAT Score: 12                   Plan for utilizing home health: n/a, will need rehab                        Likelihood of Readmission:  low                         Transition of Care Plan:  [facility], pending acceptance and auth    Pt lives alone in a 3 story town home in [place]. There are 6 steps to enter and 15 steps to her bedroom. Pt also has 15 steps to her basement. Pt owns a rw, 2 canes, bsc, and shower chair. Pt has rx coverage. Pt was independent with adls and using a cane for mobility prior to admission. Rehab discussed, [facility] was selected as preference. Referral sent via [employer]. Spoke with [facility] liaison, they have accepted and will initiate auth. Pt is aware of status with [facility]. CM will follow. JAMARCUS Luther  Care Management Interventions  PCP Verified by CM:  Yes Bindu Menard nurse navigator notified )  Transition of Care Consult (CM Consult): Discharge Planning  MyChart Signup: No  Discharge Durable Medical Equipment: No  Physical Therapy Consult: Yes  Occupational Therapy Consult: Yes  Speech Therapy Consult: No  Current Support Network: Own Home, Lives Alone  Confirm Follow Up Transport: Family

## 2018-06-19 NOTE — PROGRESS NOTES
Primary Nurse Hosea Roberts RN and Kelin Gu RN performed a dual skin assessment on this patient Impairment noted- see wound doc flow sheet  Srinivasa score is 19

## 2018-06-19 NOTE — PROGRESS NOTES
Problem: Self Care Deficits Care Plan (Adult)  Goal: *Acute Goals and Plan of Care (Insert Text)  Occupational Therapy Goals  Initiated 6/19/2018  1. Patient will perform lower body ADLs with supervision/set-up within 7 day(s). 2.  Patient will perform upper body ADLs standing 5 mins without fatigue or LOB with supervision/set-up within 7 day(s). 3.  Patient will perform all aspects of toileting with supervision/set-up within 7day(s). 4.  Patient will participate in upper extremity therapeutic exercise/activities with independence for 10 minutes within 7 day(s). 5.  Patient will utilize energy conservation techniques during functional activities without cues within 7 day(s). Occupational Therapy EVALUATION  Patient: Elsa Frausto [de-identified]77 y.o. female)  Date: 6/19/2018  Primary Diagnosis: DEGENERATIVE JOINT DISEASE BILATERAL  Primary osteoarthritis of both knees  Procedure(s) (LRB):  BILATERAL TOTAL KNEE REPLACEMENTS (Bilateral) 1 Day Post-Op   Precautions:  Fall, WBAT    ASSESSMENT :  Cleared by RN to see pt for therapy session. Based on the objective data described below, the patient presents with decreased balance, strength, and endurance, decreased LE ROM, post-surgical pain and hypotension following admission for bilateral TKR (POD 1). Pt lives alone, previously I with ADLs and using SPC for mobility. Pt received supine in bed, agreeable to participate. Educated pt on importance of not rotating knees during ADLs and mobility. Transferred to sitting EOB with mod A, additional time and HOB elevated. Good sitting balance EOB. Pt began c/o dizziness and with decreased alertness, BP monitored and pt found to be hypotensive. Returned to supine with max A x2 and bed placed in trendelenburg. Pt's BP recovered, bed placed in supine. Left with PT to perform LE exercises. Patient will benefit from skilled intervention to address the above impairments.  She is highly motivated to improve her functional performance, puts forth good effort during session. Recommend rehab at discharge to increase independence and safety prior to returning home. BP:  Supine: 108/53  Sitting initially: 105/52  Sitting after a few minutes, pt symptomatic: 62/35  Pt after a few minutes in trendelenbur/42     Patients rehabilitation potential is considered to be Good  Factors which may influence rehabilitation potential include:   [x]                None noted  []                Mental ability/status  []                Medical condition  []                Home/family situation and support systems  []                Safety awareness  []                Pain tolerance/management  []                Other:      PLAN :  Recommendations and Planned Interventions:  [x]                  Self Care Training                  [x]           Therapeutic Activities  [x]                  Functional Mobility Training    []           Cognitive Retraining  [x]                  Therapeutic Exercises           [x]           Endurance Activities  [x]                  Balance Training                   []           Neuromuscular Re-Education  []                  Visual/Perceptual Training     [x]      Home Safety Training  [x]                  Patient Education                 [x]           Family Training/Education  []                  Other (comment):    Frequency/Duration: Patient will be followed by occupational therapy 5 times a week to address goals. Discharge Recommendations: Rehab  Further Equipment Recommendations for Discharge: TBD at rehab     SUBJECTIVE:   Patient stated I tried to prepare as much as I could, so I've been doing exercises.     OBJECTIVE DATA SUMMARY:   HISTORY:   Past Medical History:   Diagnosis Date    Arthritis     L knee OA both knees per pt    History of shingles 2017    left eye- treated by opthamology with steroid drops    Hypercholesterolemia     Hypertension     Ill-defined condition     obese  BMI= 30.7 on 2017  Liver disease     H/O HEPATITIS A  contracted while working in [facility] Other ill-defined conditions(987.00) [address] AS [facility] 6CM TILT     Past Surgical History:   Procedure Laterality Date    COLONOSCOPY N/A 1/23/2018    COLONOSCOPY performed by Lexi Morales MD at [address], DIAGNOSTIC  1995 & 2005 2015    HX APPENDECTOMY  1980s    HX HEENT      wisdom teeth extraction    HX KNEE ARTHROSCOPY Left 04/2012    A/S Meniscal Repair     HX KNEE ARTHROSCOPY Right 04/2011    A/S Meniscal Repair     HX ORTHOPAEDIC  1995    L BUNIONECTOMY    TOTAL HIP ARTHROPLASTY Left 02/2017       Prior Level of Function/Environment/Context:  Pt lives alone, previously I with ADLs and using SPC for mobility. Home Situation  Home Environment: Private residence  # Steps to Enter: 6  Rails to Enter: Yes  One/Two Story Residence: Other (Comment) (3 story - bedroom and full shower second level)  # of Interior Steps: 14  Height of Each Step (in): 0 inches  Interior Rails: Left  Lift Chair Available: No  Living Alone: Yes  Support Systems: Family member(s)  Patient Expects to be Discharged to[de-identified] Private residence  Current DME Used/Available at Home: Adaptive dressing aides, Cane, straight, Commode, bedside, Raised toilet seat, Shower chair, Walker, rolling  Tub or Shower Type: Tub/Shower combination  Hand dominance: Right    EXAMINATION OF PERFORMANCE DEFICITS:  Cognitive/Behavioral Status:  Neurologic State: Alert  Orientation Level: Oriented X4  Cognition: Follows commands  Perception: Appears intact  Perseveration: No perseveration noted  Safety/Judgement: Awareness of environment; Fall prevention      Hearing:   Auditory  Auditory Impairment: None    Vision/Perceptual:              Acuity: Within Defined Limits         Range of Motion:  AROM: Generally decreased, functional  PROM: Generally decreased, functional       Strength:  Strength: Generally decreased, functional Coordination:  Coordination: Within functional limits  Fine Motor Skills-Upper: Left Intact; Right Intact    Gross Motor Skills-Upper: Left Intact; Right Intact    Tone & Sensation:  Tone: Normal  Sensation: Intact     Balance:  Sitting: Intact  Standing:  (unable to attempt due to hypotension)    Functional Mobility and Transfers for ADLs:  Bed Mobility:  Supine to Sit: Moderate assistance; Additional time  Sit to Supine: Maximum assistance;Assist x2    Transfers:  Sit to Stand:  (unable due to hypotension)    ADL Assessment:  Feeding: Setup    Oral Facial Hygiene/Grooming: Setup    Bathing: Moderate assistance    Upper Body Dressing: Setup    Lower Body Dressing: Total assistance    Toileting: Maximum assistance     ADL Intervention and task modifications:   Educated pt on importance of avoiding rotation of bilateral knees during ADLs and mobility. Lower Body Dressing Assistance  Socks: Total assistance (dependent)         Cognitive Retraining  Safety/Judgement: Awareness of environment; Fall prevention      Functional Measure:  Barthel Index:    Bathin  Bladder: 5  Bowels: 10  Groomin  Dressin  Feeding: 10  Mobility: 0  Stairs: 0  Toilet Use: 0  Transfer (Bed to Chair and Back): 5  Total: 40       Barthel and G-code impairment scale:  Percentage of impairment CH  0% CI  1-19% CJ  20-39% CK  40-59% CL  60-79% CM  80-99% CN  100%   Barthel Score 0-100 100 99-80 79-60 59-40 20-39 1-19   0   Barthel Score 0-20 20 17-19 13-16 9-12 5-8 1-4 0      The Barthel ADL Index: Guidelines  1. The index should be used as a record of what a patient does, not as a record of what a patient could do. 2. The main aim is to establish degree of independence from any help, physical or verbal, however minor and for whatever reason. 3. The need for supervision renders the patient not independent. 4. A patient's performance should be established using the best available evidence.  Asking the patient, friends/relatives and nurses are the usual sources, but direct observation and common sense are also important. However direct testing is not needed. 5. Usually the patient's performance over the preceding 24-48 hours is important, but occasionally longer periods will be relevant. 6. Middle categories imply that the patient supplies over 50 per cent of the effort. 7. Use of aids to be independent is allowed. Yoon Alcantara., Barthel, D.W. (0192). Functional evaluation: the Barthel Index. [address] [facility] (14)2. Zainab Yadav ace GORGE Chavarria, Nelli Correa., Lilkanwal Monterroso., [place], [address] (1999). Measuring the change indisability after inpatient rehabilitation; comparison of the responsiveness of the Barthel Index and Functional [place] Measure. Journal of Neurology, Neurosurgery, and Psychiatry, 66(4), 529-366. Deisi Reyes, N.J.A, KENNY Mosqueda, & Mellisa Neely, M.A. (2004.) Assessment of post-stroke quality of life in cost-effectiveness studies: The usefulness of the Barthel Index and the EuroQoL-5D. Quality of Life Research, 13, 385-34       G codes: In compliance with CMSs Claims Based Outcome Reporting, the following G-code set was chosen for this patient based on their primary functional limitation being treated: The outcome measure chosen to determine the severity of the functional limitation was the Barthel Index with a score of 40/100 which was correlated with the impairment scale. ?  Self Care:     - CURRENT STATUS: CK - 40%-59% impaired, limited or restricted    - GOAL STATUS: CI - 1%-19% impaired, limited or restricted    - D/C STATUS:  ---------------To be determined---------------     Occupational Therapy Evaluation Charge Determination   History Examination Decision-Making   LOW Complexity : Brief history review  LOW Complexity : 1-3 performance deficits relating to physical, cognitive , or psychosocial skils that result in activity limitations and / or participation restrictions  LOW Complexity : No comorbidities that affect functional and no verbal or physical assistance needed to complete eval tasks       Based on the above components, the patient evaluation is determined to be of the following complexity level: LOW     Pain:  Pain Scale 1: Numeric (0 - 10)  Pain Intensity 1: 2              Activity Tolerance:   Good  Please refer to the flowsheet for vital signs taken during this treatment. After treatment:   [] Patient left in no apparent distress sitting up in chair  [x] Patient left in no apparent distress in bed  [x] Call bell left within reach  [x] Nursing notified  [x] Caregiver present  [] Bed alarm activated    COMMUNICATION/EDUCATION:   The patients plan of care was discussed with: Physical Therapist and Registered Nurse. [x]    Home safety education was provided and the patient/caregiver indicated understanding. [x]    Patient/family have participated as able in goal setting and plan of care. [x]    Patient/family agree to work toward stated goals and plan of care. []    Patient understands intent and goals of therapy, but is neutral about his/her participation. []    Patient is unable to participate in goal setting and plan of care. This patients plan of care is appropriate for delegation to [facility].     Thank you for this referral.  Sayra Coleman, OT  Time Calculation: 21 mins

## 2018-06-19 NOTE — PROGRESS NOTES
PT deferred - Patient meeting with pastoral care at this time. PT will attempt again for evaluation in 20+ minutes.   Dewey Taylor,PT,DPT

## 2018-06-19 NOTE — PROGRESS NOTES
Problem: Mobility Impaired (Adult and Pediatric)  Goal: *Acute Goals and Plan of Care (Insert Text)  Physical Therapy Goals  Initiated 6/19/2018    1. Patient will move from supine to sit and sit to supine , scoot up and down and roll side to side in bed with modified independence within 7 days. 2. Patient will perform sit <> stand with mod assist x 1 within 7 days. 3. Patient will ambulate with min assist for 10 feet with the least restrictive device within 7 days. 4. Patient will ascend/descend 4 stairs with 1 handrail(s) with moderate assistance  within 7 days. 5. Patient will perform home exercise program per protocol with modified independence within 7 days. 6. Patient will demonstrate AROM 0-90 degrees in operative joint within 7 days. physical Therapy TREATMENT  Patient: Denis Petty [de-identified]77 y.o. female)  Date: 6/19/2018  Diagnosis: DEGENERATIVE JOINT DISEASE BILATERAL  Primary osteoarthritis of both knees <principal problem not specified>  Procedure(s) (LRB):  BILATERAL TOTAL KNEE REPLACEMENTS (Bilateral) 1 Day Post-Op  Precautions: Fall, WBAT  Chart, physical therapy assessment, plan of care and goals were reviewed. ASSESSMENT:  Patient with pain but premedicated as this am. Treatment nearly exactly the same as the am- however patient denied symptoms and stayed vocal despite drop in pressure to 70/33 after total of five minutes sitting edge of bed. BP starting point improved from the am after IV fluids and patient was able to tolerate slightly longer period edge of bed dangling but unsafe to proceed. Recommend for safety, patient dangle with PCT or RN staff if possible in early am to assess orthostatic hypotension. Patient not to be left alone bedside and two persons for ready back to bed. Patient BP close monitored and recline to supine to recover if symptomatic.  Advised patient increase head of bed upright as frequently as possible this pm. Exercises later with sister who is a PT at bedside, refreshed ice, gait belt for very gentle active assisted heel slides as tolerated, reviewed 20 minute use of heel prop and fall prevention. Patient giving 110% but low BPs persisted throughout today making early WBAT on pod#1 knees unsafe. Patient has been accepted at [facility] inpatient. Progression toward goals:  [x]      Improving appropriately and progressing toward goals  []      Improving slowly and progressing toward goals  []      Not making progress toward goals and plan of care will be adjusted     PLAN:  Patient continues to benefit from skilled intervention to address the above impairments. Continue treatment per established plan of care. Discharge Recommendations:  Inpatient Rehab  Further Equipment Recommendations for Discharge:  TBD post rehab     SUBJECTIVE:   Patient stated .    OBJECTIVE DATA SUMMARY:   Critical Behavior:  Neurologic State: Alert  Orientation Level: Oriented X4  Cognition: Follows commands  Safety/Judgement: Awareness of environment, Fall prevention  Range of Motion:  AROM: Generally decreased, functional  PROM: Generally decreased, functional     RLE AROM  R Knee Flexion: 75  R Knee Extension: 10           LLE PROM  L Knee Flexion: 70  L Knee Extension: 10  Functional Mobility Training:  Bed Mobility:     Supine to Sit: Moderate assistance; Additional time  Sit to Supine: Maximum assistance;Assist x2           Transfers:  Sit to Stand:  (unable due to hypotension)     Balance:  Sitting: Intact  Standing:  (unable to attempt due to hypotension)  Ambulation/Gait Training:  Gait Description (WDL):  (unable to stand today)    Stairs: NT  Activity Tolerance:     Please refer to the flowsheet for vital signs taken during this treatment.   After treatment:   [] Patient left in no apparent distress sitting up in chair  [x] Patient left in no apparent distress in bed  [x] Call bell left within reach  [x] Nursing notified  [x] Caregiver present  [x] Bed alarm activated    COMMUNICATION/COLLABORATION:   The patients plan of care was discussed with: Registered Nurse and Certified Nursing Assistant/Patient Care Technician    Mariah Castro, PT   Time Calculation: 25 mins

## 2018-06-19 NOTE — PROGRESS NOTES
TOTAL KNEE ARTHROPLASTY DAILY NOTE     ASSESSMENT / PLAN :   1. Pain Control : Good  2. Overnight Issues : none  3. Wound or incisional issue : WHSS  4. Therapy / Weight Bearing Recommendations : WBAT  5. DVT Prophylaxis : Mechanical and ASA  6. Disposition : [facility] when stable  7. Medical Concerns : Stable  8. Comments : Stable, mobilize with therapy       POD  1 Day Post-Op s/p Procedure(s):  BILATERAL TOTAL KNEE REPLACEMENTS     SUBJECTIVE :     Patient notes the following issues : None, good pain control. OBJECTIVE :     Patient Vitals for the past 12 hrs:   BP Temp Pulse Resp SpO2   06/19/18 0701 102/64 98.4 °F (36.9 °C) 73 16 96 %   06/19/18 0338 136/76 98 °F (36.7 °C) 82 15 96 %   06/19/18 0036 127/69 98.4 °F (36.9 °C) 67 14 97 %   06/18/18 2034 106/56 97.9 °F (36.6 °C) 70 15 98 %   06/18/18 1919 124/75 97.8 °F (36.6 °C) 66 16 98 %       Alert and oriented x3. LEFT KNEE  Examination of the left knee reveals that the dressing is clean, dry and intact. Motor Exam is intact and normal. Pt is not able to perform a straight leg raise. Sensation is intact to light touch. No calf pain. RIGHT KNEE  Examination of the right knee reveals that the dressing is clean, dry and intact. Motor Exam is intact and normal. Pt is able to perform a straight leg raise. Sensation is intact to light touch. No calf pain.        Labs:  Recent Labs      06/19/18   0537   HGB  10.3*   NA  140   K  4.3   CL  108   CO2  28   BUN  8   CREA  0.69   GLU  107*      Patient mobility           Jenny Guadalupe MD  Cell [phone]  Sarika Naidu PA-C  Cell [phone]  Medical Staff : Tyree Coats  Office : [phone]

## 2018-06-19 NOTE — PROGRESS NOTES
Problem: Mobility Impaired (Adult and Pediatric)  Goal: *Acute Goals and Plan of Care (Insert Text)  Physical Therapy Goals  Initiated 6/19/2018    1. Patient will move from supine to sit and sit to supine , scoot up and down and roll side to side in bed with modified independence within 7 days. 2. Patient will perform sit <> stand with mod assist x 1 within 7 days. 3. Patient will ambulate with min assist for 10 feet with the least restrictive device within 7 days. 4. Patient will ascend/descend 4 stairs with 1 handrail(s) with moderate assistance  within 7 days. 5. Patient will perform home exercise program per protocol with modified independence within 7 days. 6. Patient will demonstrate AROM 0-90 degrees in operative joint within 7 days. physical Therapy knee EVALUATION  Patient: Dami Jacinto [de-identified]77 y.o. female)  Date: 6/19/2018  Primary Diagnosis: DEGENERATIVE JOINT DISEASE BILATERAL  Primary osteoarthritis of both knees  Procedure(s) (LRB):  BILATERAL TOTAL KNEE REPLACEMENTS (Bilateral) 1 Day Post-Op   Precautions:  Fall, WBAT    ASSESSMENT :  Several attempts to access patient this am for tx but then after failed attempts patient then 20 minutes due for pain medication. Delayed tx until patient pre medicated well (55 minutes prior) for tx. Pain 6/10 at highest level during exercises. Based on the objective data described below, the patient presents with bilateral knee pain and edema, decreased ROM and strength, and poor tolerance for first time in upright position dangling edge of bed. Total time edge of bed was for several minutes conversing and monitoring for symptoms. PT Instinct to prolong edge of bed dangling a little longer due to initial clinical presentation. Instinct proved solid as few minutes later -and successive BP measures- loss of pallor and as 3rd BP measure being attained, prep for back to bed already begun and patient back in trendelenburg and responsive immediately once supine.  Alert and Oriented x 4. Patient symptomatic with pale skin coloring, decreased responsiveness and orthostatic hypotension to a low of 63/51 from 105/60s. BP rebounded immediately when back to bed and patient reports feeling chills and fatigued. All vitals entered in flow sheets per OT. Since unable to achieve attempts at [facility] due to severe orthostatic hypotension, completed additional 10 reps of each exercise again per each LE. Patient set up with ice,Plex pulse and for comfort. Education per pre op teaching reiterated. Anticipate patient will need Inpatient Rehab for Bilateral TKA and pain management. Inpatient rehab will give the patient the opportunity to maximize functional recovery, return to independence sooner thereby defer unnecessary hospitalizations costs. It's crucial to avoid unnecessary costs of complications that can occur with the difficult rehab of B TKA such as development of knee contractures requiring knee manipulation or development of DVT and PE which can arises from immobility if unable to mobilize sufficiently for prevention. Patient safety, fall prevention and fracture prevention is of utmost importance and at this time patient high risk for falls and unable to tolerate more than sitting edge of bed today. She cannot return to independent living and will not be ready in another day or two. She requires rehab. Patient is very motivated and gives 100% effort. Patient engaged in pre-hab in preparation for this surgery and is normally active, and with continued employment at 76 yo, which she plans to resume. Patient will benefit from skilled intervention to address the above impairments.   Patients rehabilitation potential is considered to be Good  Factors which may influence rehabilitation potential include:   []         None noted  []         Mental ability/status  []         Medical condition  []         Home/family situation and support systems  []         Safety awareness  []         Pain tolerance/management  []         Other:      PLAN :  Recommendations and Planned Interventions:  []           Bed Mobility Training             []    Neuromuscular Re-Education  []           Transfer Training                   []    Orthotic/Prosthetic Training  []           Gait Training                         []    Modalities  []           Therapeutic Exercises           []    Edema Management/Control  []           Therapeutic Activities            []    Patient and Family Training/Education  []           Other (comment):    Frequency/Duration: Patient will be followed by physical therapy twice daily to address goals. Discharge Recommendations: Rehab  Further Equipment Recommendations for Discharge: none     SUBJECTIVE:   Patient stated I dont feel great.     OBJECTIVE DATA SUMMARY:   HISTORY:    Past Medical History:   Diagnosis Date    Arthritis     L knee OA both knees per pt    History of shingles 09/2017    left eye- treated by opthamology with steroid drops    Hypercholesterolemia     Hypertension     Ill-defined condition     obese  BMI= 30.7 on 1/25/2017    Liver disease     H/O HEPATITIS A  contracted while working in [facility], operated by [facility] Other ill-defined conditions([phone]) [address] AS PEDESTRIAN-PELVIS 6CM TILT     Past Surgical History:   Procedure Laterality Date    COLONOSCOPY N/A 1/23/2018    COLONOSCOPY performed by Magaly Veloz MD at [facility], [address] [place] [address]. & 2005    2015    HX APPENDECTOMY  1980s    HX HEENT      wisdom teeth extraction    HX KNEE ARTHROSCOPY Left 04/2012    A/S Meniscal Repair     HX KNEE ARTHROSCOPY Right 04/2011    A/S Meniscal Repair     HX ORTHOPAEDIC  1995    L BUNIONECTOMY    TOTAL HIP ARTHROPLASTY Left 02/2017     Prior Level of Function/Home Situation: see above  Personal factors and/or comorbidities impacting plan of care: previous ortho surgeries, recent left shoulder injection for pain management, Left DAISY in 2017    Home Situation  Home Environment: Private residence  # Steps to Enter: 6  Rails to Enter: Yes  One/Two Story Residence: Other (Comment) (3 story - bedroom and full shower second level)  # of Interior Steps: 14  Height of Each Step (in): 0 inches  Interior Rails: Left  Lift Chair Available: No  Living Alone: Yes  Support Systems: Family member(s)  Patient Expects to be Discharged to[de-identified] Private residence  Current DME Used/Available at Home: Adaptive dressing aides, Idalia Efraín, straight, Commode, bedside, Raised toilet seat, Shower chair, Walker, rolling  Tub or Shower Type: Tub/Shower combination    EXAMINATION/PRESENTATION/DECISION MAKING:   Critical Behavior:  Neurologic State: Alert  Orientation Level: Oriented X4  Cognition: Follows commands  Safety/Judgement: Awareness of environment, Fall prevention  Hearing: Auditory  Auditory Impairment: None    Range Of Motion:  AROM: Generally decreased, functional           PROM: Generally decreased, functional  RLE AROM  R Knee Flexion: 75  R Knee Extension: 10     LLE PROM  L Knee Flexion: 70  L Knee Extension: 10  Strength:    Strength: Generally decreased, functional                    Tone & Sensation:   Tone: Normal              Sensation: Intact               Coordination:  Coordination: Within functional limits  Vision:   Acuity: Within Defined Limits  Functional Mobility:  Bed Mobility:     Supine to Sit: Moderate assistance; Additional time  Sit to Supine: Maximum assistance;Assist x2     Transfers:  Sit to Stand:  (unable due to hypotension)                          Balance:   Sitting: Intact  Standing:  (unable to attempt due to hypotension)  Ambulation/Gait Training:              Gait Description (WDL):  (unable to stand today)                 Therapeutic Exercises:   Initiated basic knee exercises - completed 2 sets of 10 reps BLE of all exercises: AP HSets HSlides QS    Functional Measure:  Barthel Index:    Bathin  Bladder: 5  Bowels: 10  Grooming: 5  Dressin  Feeding: 10  Mobility: 0  Stairs: 0  Toilet Use: 0  Transfer (Bed to Chair and Back): 5  Total: 40       Barthel and G-code impairment scale:  Percentage of impairment CH  0% CI  1-19% CJ  20-39% CK  40-59% CL  60-79% CM  80-99% CN  100%   Barthel Score 0-100 100 99-80 79-60 59-40 20-39 1-19   0   Barthel Score 0-20 20 17-19 13-16 9-12 5-8 1-4 0      The Barthel ADL Index: Guidelines  1. The index should be used as a record of what a patient does, not as a record of what a patient could do. 2. The main aim is to establish degree of independence from any help, physical or verbal, however minor and for whatever reason. 3. The need for supervision renders the patient not independent. 4. A patient's performance should be established using the best available evidence. Asking the patient, friends/relatives and nurses are the usual sources, but direct observation and common sense are also important. However direct testing is not needed. 5. Usually the patient's performance over the preceding 24-48 hours is important, but occasionally longer periods will be relevant. 6. Middle categories imply that the patient supplies over 50 per cent of the effort. 7. Use of aids to be independent is allowed. Derik Pinto., Barthel, D.W. ([id number]). Functional evaluation: the Barthel Index. [address] [facility] (14)2. GORGE Perez Sa, Rachele Welch., Dana Craig., [facility], [address] [place] [address] (). Measuring the change indisability after inpatient rehabilitation; comparison of the responsiveness of the Barthel Index and Functional Canute Measure. Journal of Neurology, Neurosurgery, and Psychiatry, 66(4), 713-495. Yeimi Fernández, N.J.A, TRESA Mosqueda.JACKI, & Shannan Rordiguez MJoanA. (2004.) Assessment of post-stroke quality of life in cost-effectiveness studies: The usefulness of the Barthel Index and the EuroQoL-5D. Quality of Life Research, 13, 246-78       G codes:   In compliance with CMSs Claims Based Outcome Reporting, the following G-code set was chosen for this patient based on their primary functional limitation being treated: The outcome measure chosen to determine the severity of the functional limitation was the Barthel Index with a score of 40/100 which was correlated with the impairment scale. ? Mobility - Walking and Moving Around:     - CURRENT STATUS: CK - 40%-59% impaired, limited or restricted    - GOAL STATUS: CJ - 20%-39% impaired, limited or restricted    - D/C STATUS:  ---------------To be determined---------------        Physical Therapy Evaluation Charge Determination   History Examination Presentation Decision-Making   HIGH Complexity :3+ comorbidities / personal factors will impact the outcome/ POC  HIGH Complexity : 4+ Standardized tests and measures addressing body structure, function, activity limitation and / or participation in recreation  HIGH Complexity : Unstable and unpredictable characteristics  Other outcome measures Barthel Index  HIGH       Based on the above components, the patient evaluation is determined to be of the following complexity level: HIGH     Pain:  Pain Scale 1: Numeric (0 - 10)  Pain Intensity 1: 2              Activity Tolerance:   Poor - see flowsheet, SPO2 and HR stable, Huge 40 point plus drop in systolic BP with dangling EOB  Please refer to the flowsheet for vital signs taken during this treatment. After treatment:   []         Patient left in no apparent distress sitting up in chair  [x]         Patient left in no apparent distress in bed  [x]         Call bell left within reach  [x]         Nursing notified  [x]         Caregiver present  [x]         Bed alarm activated    COMMUNICATION/EDUCATION:   The patients plan of care was discussed with: Occupational Therapist, Registered Nurse and . [x]         Fall prevention education was provided and the patient/caregiver indicated understanding.   []         Patient/family have participated as able in goal setting and plan of care. []         Patient/family agree to work toward stated goals and plan of care. []         Patient understands intent and goals of therapy, but is neutral about his/her participation. []         Patient is unable to participate in goal setting and plan of care.     Thank you for this referral.  Alejandro Rene, PT   Time Calculation: 55 mins

## 2018-06-19 NOTE — PROGRESS NOTES
Spiritual Care Assessment/Progress Note  [employer]      NAME: Denis Petty      MRN: [id number]  AGE: 77 y.o.  SEX: female  Druze Affiliation: Yazidism   Language: English     6/19/2018     Total Time (in minutes): 35     Spiritual Assessment begun in [facility] 4M POST SURG ORT 2 through conversation with:         [x]Patient        [] Family    [] Friend(s)        Reason for Consult: Advance medical directive consult     Spiritual beliefs: (Please include comment if needed)     [x] Identifies with a tim tradition: Yazidism        [] Supported by a tim community:            [] Claims no spiritual orientation:           [] Seeking spiritual identity:                [] Adheres to an individual form of spirituality:           [] Not able to assess:                           Identified resources for coping:      [] Prayer                               [] Music                  [] Guided Imagery     [] Family/friends                 [] Pet visits     [] Devotional reading                         [] Unknown     [] Other:                                              Interventions offered during this visit: (See comments for more details)    Patient Interventions: Advance medical directive consult, Affirmation of tim, Affirmation of emotions/emotional suffering, Iconic (affirming the presence of God/Higher Power), Initial/Spiritual assessment, patient floor, Prayer (assurance of)           Plan of Care:     [] Support spiritual and/or cultural needs    [] Support AMD and/or advance care planning process      [] Support grieving process   [] Coordinate Rites and/or Rituals    [] Coordination with community clergy   [] No spiritual needs identified at this time   [] Detailed Plan of Care below (See Comments)  [] Make referral to Music Therapy  [] Make referral to Pet Therapy     [] Make referral to Addiction services  [] Make referral to [facility]  [] Make referral to Spiritual Care Partner  [] No future visits requested        [x] Follow up visits as needed     Comments: Responded to Advance Medical Directive (AMD) request from in [facility] [address]. Miss Santosh Gomez is known to me from my time here with [facility]. She shared she had done a 5 Wishes years ago. Explained that that document could be used if she knows where it is. She has 4 siblings. Michael Connelly shared her sister who is coming by today would probably be the best choice for her decision maker. Left and AMD form incase she needed it should she not be able to locate her 5 wishes. Father Trae Jennings came in during visit affirming her Anabaptist tim. Provided continued spiritual p[resence and assurance of prayer.   Visited by: Promise Watkins [address] [facility] ([id number])

## 2018-06-19 NOTE — PROGRESS NOTES
NUTRITION   RD Screen      Pt seen for:      []  MST for n/a    [x]   MD Consult - ERAS   []        Supplements  []   PO intake check   []        Food Allergies  []   Food Preferences/tolerances    []        Rescreen  []   Education    []        Diet order clarification []   Other   []  LOS                          Nutrition Prescription:     No changes or new recommendations at this time  Encourage adequate intake of meals and supplements  Daily High Protein ONS 1x for at least next 30 days     Assessment:   Information obtained from:   patient         Pt admitted for planned bilateral total knee replacements. Pt following ERAS protocol & was drinking ONS PTA daily. Pt is eating well, well built, well-nourished. Instructed to continue ONS for 30days after discharge to help prevent nutritional deficiencies. Pt reports to drink Prune juice daily at home and would like this here as well. No other nutrition related concerns at this time. Included discharge instructions. Cultural, Episcopalian and ethnic food preferences:   [x]  None   []  Identified and addressed    Diet:  Regular    PO Intake: [x]   Good     []  Fair      [] Poor     No data found. Wt Readings from Last 5 Encounters:   06/18/18 81.3 kg (179 lb 3.7 oz)   06/06/18 83.7 kg (184 lb 8.4 oz)   02/09/18 85.7 kg (189 lb)   07/13/17 87.5 kg (192 lb 12.8 oz)   02/07/17 91.2 kg (201 lb)   ]    Body mass index is 28.93 kg/(m^2). Skin: surgical incisions  BM:  6/18   ABD: soft, active    Estimated Daily Nutrition Requirements:  Kcals/day: 6254 Kcals/day  Protein: 92 g ( - 98g (1.2-1.3 g/kg of current wt)  Fluid:  (1 mL/kcal of intake )     Based On:  [address] [place] (AF 1.2)  Weight Used: Actual wt (81.3 kg)    Weight Changes:   [x]   Loss (intentional with healthy eating)  []   Gain  []   Stable    Nutrition Problems Identified  [x]     None  []     Specified food preferences   []     Dislikes supplements              []     Allergies  []     Difficulty chewing     []     Dentition   []     Nausea/Vomiting  []    Constipation  []    Diarrhea    Nutrition Diagnosis:      No nutrition diagnosis identified at this time    Intervention:   []    Obtained/adjusted food preferences/tolerances and/or snacks options   []    Dislikes supplements will try a substitution   []    Modify diet for food allergies  []    Adjust texture due to difficulty chewing   []    Encourage Fresh Fruit, Activia yogurt, fluid   []    Educated patient  [x]    Rescreen per screening protocol  [x]    Add Supplements    Goal: n/a    Monitoring/Evaluation:   Education & Discharge Needs  [] Nutrition related discharge needs addressed:   [x] Supplements (on d/c instruction &/or coupons provided)    [x] Education   [] No nutrition related discharge needs at this time    Rescreen: []  At Nutrition Risk          [x]  Not at Nutrition Risk, rescreen per screening protocol    Jerod Mcneal MS, RD, CDE

## 2018-06-20 LAB — HGB BLD-MCNC: 8.9 G/DL (ref 11.5–16)

## 2018-06-20 PROCEDURE — 97530 THERAPEUTIC ACTIVITIES: CPT

## 2018-06-20 PROCEDURE — 97110 THERAPEUTIC EXERCISES: CPT

## 2018-06-20 PROCEDURE — 85018 HEMOGLOBIN: CPT | Performed by: PHYSICIAN ASSISTANT

## 2018-06-20 PROCEDURE — 74011250637 HC RX REV CODE- 250/637: Performed by: PHYSICIAN ASSISTANT

## 2018-06-20 PROCEDURE — 74011250637 HC RX REV CODE- 250/637: Performed by: NURSE PRACTITIONER

## 2018-06-20 PROCEDURE — 36415 COLL VENOUS BLD VENIPUNCTURE: CPT | Performed by: PHYSICIAN ASSISTANT

## 2018-06-20 PROCEDURE — 65270000029 HC RM PRIVATE

## 2018-06-20 RX ORDER — HYDROCHLOROTHIAZIDE 25 MG/1
25 TABLET ORAL DAILY
Status: DISCONTINUED | OUTPATIENT
Start: 2018-06-21 | End: 2018-06-22 | Stop reason: HOSPADM

## 2018-06-20 RX ADMIN — POLYETHYLENE GLYCOL (3350) 17 G: 17 POWDER, FOR SOLUTION ORAL at 09:13

## 2018-06-20 RX ADMIN — ASPIRIN 325 MG: 325 TABLET, DELAYED RELEASE ORAL at 09:13

## 2018-06-20 RX ADMIN — ACETAMINOPHEN 650 MG: 325 TABLET ORAL at 00:31

## 2018-06-20 RX ADMIN — ASPIRIN 325 MG: 325 TABLET, DELAYED RELEASE ORAL at 17:45

## 2018-06-20 RX ADMIN — SENNOSIDES AND DOCUSATE SODIUM 1 TABLET: 8.6; 5 TABLET ORAL at 09:13

## 2018-06-20 RX ADMIN — CELECOXIB 200 MG: 100 CAPSULE ORAL at 21:10

## 2018-06-20 RX ADMIN — OXYCODONE HYDROCHLORIDE 5 MG: 5 TABLET ORAL at 04:59

## 2018-06-20 RX ADMIN — ACETAMINOPHEN 650 MG: 325 TABLET ORAL at 12:20

## 2018-06-20 RX ADMIN — FAMOTIDINE 20 MG: 20 TABLET ORAL at 09:13

## 2018-06-20 RX ADMIN — CELECOXIB 200 MG: 100 CAPSULE ORAL at 09:13

## 2018-06-20 RX ADMIN — OXYCODONE HYDROCHLORIDE 10 MG: 5 TABLET ORAL at 12:20

## 2018-06-20 RX ADMIN — OXYCODONE HYDROCHLORIDE 10 MG: 5 TABLET ORAL at 15:24

## 2018-06-20 RX ADMIN — ACETAMINOPHEN 650 MG: 325 TABLET ORAL at 05:00

## 2018-06-20 RX ADMIN — ACETAMINOPHEN 650 MG: 325 TABLET ORAL at 17:45

## 2018-06-20 RX ADMIN — ACETAMINOPHEN 650 MG: 325 TABLET ORAL at 23:38

## 2018-06-20 RX ADMIN — SENNOSIDES AND DOCUSATE SODIUM 1 TABLET: 8.6; 5 TABLET ORAL at 17:45

## 2018-06-20 RX ADMIN — FAMOTIDINE 20 MG: 20 TABLET ORAL at 17:45

## 2018-06-20 RX ADMIN — OXYCODONE HYDROCHLORIDE 5 MG: 5 TABLET ORAL at 19:47

## 2018-06-20 RX ADMIN — OXYCODONE HYDROCHLORIDE 10 MG: 5 TABLET ORAL at 09:12

## 2018-06-20 RX ADMIN — SIMVASTATIN 40 MG: 20 TABLET, FILM COATED ORAL at 21:11

## 2018-06-20 RX ADMIN — Medication 10 ML: at 21:11

## 2018-06-20 NOTE — PROGRESS NOTES
Rounded on Latter day patients and provided Anointing of the Sick at request of patient    Fr. Maeve Amezcua

## 2018-06-20 NOTE — PROGRESS NOTES
Problem: Pressure Injury - Risk of  Goal: *Prevention of pressure injury  Document Srinivasa Scale and appropriate interventions in the flowsheet. Outcome: Progressing Towards Goal  Pressure Injury Interventions:  Sensory Interventions: Check visual cues for pain    Moisture Interventions: Absorbent underpads, Check for incontinence Q2 hours and as needed, Internal/External urinary devices, Offer toileting Q_hr    Activity Interventions: Increase time out of bed, Pressure redistribution bed/mattress(bed type), PT/OT evaluation    Mobility Interventions: Float heels, HOB 30 degrees or less, Pressure redistribution bed/mattress (bed type), PT/OT evaluation, Turn and reposition approx.  every two hours(pillow and wedges)

## 2018-06-20 NOTE — PROGRESS NOTES
Problem: Mobility Impaired (Adult and Pediatric)  Goal: *Acute Goals and Plan of Care (Insert Text)  Physical Therapy Goals  Initiated 6/19/2018    1. Patient will move from supine to sit and sit to supine , scoot up and down and roll side to side in bed with modified independence within 7 days. 2. Patient will perform sit <> stand with mod assist x 1 within 7 days. 3. Patient will ambulate with min assist for 10 feet with the least restrictive device within 7 days. 4. Patient will ascend/descend 4 stairs with 1 handrail(s) with moderate assistance  within 7 days. 5. Patient will perform home exercise program per protocol with modified independence within 7 days. 6. Patient will demonstrate AROM 0-90 degrees in operative joint within 7 days. physical Therapy TREATMENT  Patient: Chandrakant Husain [de-identified]77 y.o. female)  Date: 6/20/2018  Diagnosis: DEGENERATIVE JOINT DISEASE BILATERAL  Primary osteoarthritis of both knees <principal problem not specified>  Procedure(s) (LRB):  BILATERAL TOTAL KNEE REPLACEMENTS (Bilateral) 2 Days Post-Op  Precautions: Bed Alarm, Fall, WBAT  Chart, physical therapy assessment, plan of care and goals were reviewed. ASSESSMENT:  Patient prefers to move very slowly and does well focusing on deep breathing and pacing herself to manage her pain. Patient's sister very attentive and good advocate for patient. Patient is hard worker and really made every effort to work through symptoms in attempt to increase time out of bed today but BP and pain continued to be primary barriers. Patient BPs much better initially but now she is due for next dose of pain med. Question if pain meds causing her to be orthostatic with PT. Will recheck this theory this afternoon but opportunity to try BP now that BP more stable so attempt made and patient agreed.  Patient right on borderline of whether safe to proceed to edge of bed and chair sitting tolerance so prolonged EOB and then prolonged supervision needed for chair sitting as well. Vitals taken and range of BPs checked throughout session -  8 vitals /BPs recorded over a 35 minute cycle from start at 161/74 today (much better) to a low at 35 min of 64/53, 90bpm, 99% no LOC, pale, dizzy and nausea  beginning, then immediate supine and settled 124/58 100% 94bpm.  However BP eventually continued again cycled down and patient was up for total of 35 minutes and then needed return to flat supine before improved skin palor and decrease in symptoms. Progress today in that She was able to WB BLEs and tolerate two SPTs with mod to max x 2 assist. ROM Bilaterally 10- 75. [facility] accepted but insurance pending. Will keep working diligently with OOB and exercises. Have asked patient to keep exercising at minimum BID and her sister is PT who is very willing to assist. Progression toward goals:  [x]      Improving appropriately and progressing toward goals  []      Improving slowly and progressing toward goals  []      Not making progress toward goals and plan of care will be adjusted     PLAN:  Patient continues to benefit from skilled intervention to address the above impairments. Continue treatment per established plan of care. Discharge Recommendations:   Inpatient Rehab  Further Equipment Recommendations for Discharge:  TBD at rehab     SUBJECTIVE:   Patient stated .    OBJECTIVE DATA SUMMARY:   Critical Behavior:  Neurologic State: Alert  Orientation Level: Oriented X4  Cognition: Appropriate decision making, Appropriate for age attention/concentration, Appropriate safety awareness, Follows commands  Safety/Judgement: Awareness of environment, Fall prevention  Range of Motion:           RLE AROM  R Knee Flexion: 75  R Knee Extension: 10           LLE PROM  L Knee Flexion: 75  L Knee Extension: 10  Functional Mobility Training:  Bed Mobility:     Supine to Sit: Moderate assistance; Additional time;Assist x1 (struggles due to upper body weakness)  Sit to Supine:  Moderate assistance;Assist x2  Scooting: Minimum assistance; Moderate assistance;Assist x1 (difficult task)        Transfers:  Sit to Stand: Moderate assistance; Additional time;Assist x2  Stand to Sit: Moderate assistance; Additional time;Assist x2  Stand Pivot Transfers: Assist x2     Bed to Chair: Moderate assistance;Assist x2                    Balance:  Sitting: Intact; Without support  Standing: Impaired; With support  Standing - Static: Constant support; Fair  Standing - Dynamic : Fair  Ambulation/Gait Training:  Right Side Weight Bearing: As tolerated  Left Side Weight Bearing: As tolerated     Therapeutic Exercises:     EXERCISE   Sets   Reps   Active Active Assist   Passive Self ROM   Comments   Ankle Pumps  10 []                                        []                                        []                                        []                                           Quad Sets  10 []                                        []                                        []                                        []                                           Hamstring Sets  10 []                                        []                                        []                                        []                                           Short Arc Quads  NT []                                        []                                        []                                        []                                           Knee Extension Stretch  1   []                                          []                                          []                                          []                                           Heel Slides  10 []                                        []                                        []                                        []                                           Long Arc Quads  NT []                                        [] []                                        []                                           Knee Flexion Stretch  1 []                                        []                                        []                                        []                                           Straight Leg Raises  Unable []                                        []                                        []                                        []                                             Pain:  Pain Scale 1: Numeric (0 - 10)  Pain Intensity 1: 7  Pain Location 1: Knee  Pain Orientation 1: Left;Right  Pain Description 1: Aching; Sore  Pain Intervention(s) 1: Medication (see MAR)  Activity Tolerance:   Improving but difficult to gain sitting up in chair tolerance  Please refer to the flowsheet for vital signs taken during this treatment.   After treatment:   [] Patient left in no apparent distress sitting up in chair  [x] Patient left in no apparent distress in bed  [x] Call bell left within reach  [x] Nursing notified  [x] Caregiver present  [x] Bed alarm activated    COMMUNICATION/COLLABORATION:   The patients plan of care was discussed with: Registered Nurse    Delano Huitron, PT   Time Calculation: 57 mins

## 2018-06-20 NOTE — PROGRESS NOTES
Problem: Self Care Deficits Care Plan (Adult)  Goal: *Acute Goals and Plan of Care (Insert Text)  Occupational Therapy Goals  Initiated 6/19/2018  1. Patient will perform lower body ADLs with supervision/set-up within 7 day(s). 2.  Patient will perform upper body ADLs standing 5 mins without fatigue or LOB with supervision/set-up within 7 day(s). 3.  Patient will perform all aspects of toileting with supervision/set-up within 7day(s). 4.  Patient will participate in upper extremity therapeutic exercise/activities with independence for 10 minutes within 7 day(s). 5.  Patient will utilize energy conservation techniques during functional activities without cues within 7 day(s). Occupational Therapy TREATMENT  Patient: Jaiden Do [de-identified]77 y.o. female)  Date: 6/20/2018  Diagnosis: DEGENERATIVE JOINT DISEASE BILATERAL  Primary osteoarthritis of both knees <principal problem not specified>  Procedure(s) (LRB):  BILATERAL TOTAL KNEE REPLACEMENTS (Bilateral) 2 Days Post-Op  Precautions: Bed Alarm, Fall, WBAT  Chart, occupational therapy assessment, plan of care, and goals were reviewed. ASSESSMENT:  Pt pleasant agreeable to OT. She was issued red theraband to work on increasing UE strength for functional tasks. She was able to tolerate 1 set x 10 of chest presses, shoulder ab/add and shoulder horizontal ab/add. Pt reminded to exhale during exertion and she stated \" she reminds me of that\" and referring to her sister who is a PT. Pt will benefit from rehab. Progression toward goals:  []       Improving appropriately and progressing toward goals  [x]       Improving slowly and progressing toward goals  []       Not making progress toward goals and plan of care will be adjusted     PLAN:  Patient continues to benefit from skilled intervention to address the above impairments. Continue treatment per established plan of care.   Discharge Recommendations:  Rehab  Further Equipment Recommendations for Discharge: None SUBJECTIVE:   Patient stated Who do I talk to about getting dressed.     OBJECTIVE DATA SUMMARY:   Cognitive/Behavioral Status:  Neurologic State: Alert  Orientation Level: Oriented X4  Cognition: Appropriate decision making             Functional Mobility and Transfers for ADLs:  Bed Mobility:  Supine to Sit: Moderate assistance; Additional time;Assist x1 (struggles due to upper body weakness)  Sit to Supine: Moderate assistance;Assist x2  Scooting: Minimum assistance; Moderate assistance;Assist x1 (difficult task)    Transfers:  Sit to Stand: Moderate assistance; Additional time;Assist x2     Bed to Chair: Moderate assistance;Assist x2    Balance:  Sitting: Intact; Without support  Standing: Impaired; With support  Standing - Static: Constant support; Fair  Standing - Dynamic : Fair    ADL Intervention:     Pt stated she has a hip kit at home from a prior surgery. Therapeutic Exercises:     EXERCISE   Sets   Reps   Active Active Assist   Passive   Comments   Chest presses 1 10 [x]           []           []              Shoulder flex/ext 1 10 [x]           []           []              Shoulder horizontal ab/add 1 10 [x]           []           []                 []           []           []                 []           []           []                 []           []           []                 []           []           []                 []           []           []                 []           []           []                 []           []           []                 []           []           []                  Pain:  Pain Scale 1: Numeric (0 - 10)  Pain Intensity 1: 7  Pain Location 1: Knee  Pain Orientation 1: Left;Right  Pain Description 1: Aching; Sore  Pain Intervention(s) 1: Medication (see MAR)  Activity Tolerance:   Fair  Please refer to the flowsheet for vital signs taken during this treatment.   After treatment:   [] Patient left in no apparent distress sitting up in chair  [x] Patient left in no apparent distress in bed  [x] Call bell left within reach  [] Nursing notified  [x] Caregiver present  [] Bed alarm activated    COMMUNICATION/COLLABORATION:   The patients plan of care was discussed with: Occupational Therapist    FRANCISCO Mcgarry  Time Calculation: 20 mins

## 2018-06-20 NOTE — PROGRESS NOTES
6/20/2018 2:26 PM Spoke with [facility] liaison, Lamont Leo is still pending. CM will follow. 6/20/2018  9:04 AM Discharge order noted. [facility] has accepted and Lamont Leo is pending with Dash Hudson, [facility]. CM will follow.  JAMARCUS Nelson

## 2018-06-20 NOTE — PROGRESS NOTES
Bedside and Verbal shift change report given to Kamryn Rob RN (oncoming nurse) by Shelbi Pastrana (offgoing nurse). Report included the following information SBAR, Kardex, Procedure Summary, Intake/Output, MAR, Accordion and Recent Results.

## 2018-06-20 NOTE — PROGRESS NOTES
BILATERAL TOTAL KNEE ARTHROPLASTY DAILY NOTE     ASSESSMENT / PLAN :   1. Pain Control : adequate   2. Overnight Issues : slept much better last night  3. Wound or incisional issue : none - no visible drainage - Aquacel dressing intact  4. Therapy / Weight Bearing Recommendations : WBAT - mobilize with walker  5. DVT Prophylaxis : Mechanical and aspirin  6. Disposition : Rehab recommended  7. Medical Concerns : monitor BP today when up with PT - Patient received a 500 mL fluid bolus to treat orthostatic hypotension  8. Comments : Anticipate she will be accepted to [facility] and be transferred today vs tomorrow pending progress made with PT       POD  2 Days Post-Op s/p Procedure(s):  BILATERAL TOTAL KNEE REPLACEMENTS    Acute post-operative blood-loss anemia - expected - orthostatic hypotension yesterday. Status improved following IV fluid bolus. SUBJECTIVE :     Patient notes the following issues : \"Feeling much better today and looking forward to making progress with PT.\"     OBJECTIVE :     Patient Vitals for the past 12 hrs:   BP Temp Pulse Resp SpO2   06/20/18 0522 111/66 98.4 °F (36.9 °C) 75 15 98 %   06/20/18 0016 111/63 98.1 °F (36.7 °C) 78 16 96 %   06/19/18 2040 100/57 98.4 °F (36.9 °C) 76 16 96 %       Alert and oriented x3. LEFT KNEE  Examination of the left knee reveals that the dressing is clean, dry and intact. Motor Exam is intact and normal. Pt is not able to perform a straight leg raise. Sensation is intact to light touch. No calf pain. RIGHT KNEE  Examination of the right knee reveals that the dressing is clean, dry and intact. Motor Exam is intact and normal. Pt not able to perform a straight leg raise. Sensation is intact to light touch. No calf pain.        Labs:  Recent Labs      06/20/18   0458  06/19/18   0537   HGB  8.9*  10.3*   NA   --   140   K   --   4.3   CL   --   108   CO2   --   28   BUN   --   8   CREA   --   0.69   GLU   --   107*      Patient mobility Mine Roper MD  Cell [phone]  Abdias Pack PA-C  Cell [phone]  Medical Staff : Carrillo Lam  Office : [phone]

## 2018-06-21 PROCEDURE — 74011250637 HC RX REV CODE- 250/637: Performed by: NURSE PRACTITIONER

## 2018-06-21 PROCEDURE — 97535 SELF CARE MNGMENT TRAINING: CPT

## 2018-06-21 PROCEDURE — 65270000029 HC RM PRIVATE

## 2018-06-21 PROCEDURE — 77030038269 HC DRN EXT URIN PURWCK BARD -A

## 2018-06-21 PROCEDURE — 97116 GAIT TRAINING THERAPY: CPT

## 2018-06-21 PROCEDURE — 97110 THERAPEUTIC EXERCISES: CPT

## 2018-06-21 PROCEDURE — 97530 THERAPEUTIC ACTIVITIES: CPT

## 2018-06-21 PROCEDURE — 74011250637 HC RX REV CODE- 250/637: Performed by: PHYSICIAN ASSISTANT

## 2018-06-21 RX ADMIN — SIMVASTATIN 40 MG: 20 TABLET, FILM COATED ORAL at 22:30

## 2018-06-21 RX ADMIN — ACETAMINOPHEN 650 MG: 325 TABLET ORAL at 12:34

## 2018-06-21 RX ADMIN — FAMOTIDINE 20 MG: 20 TABLET ORAL at 09:20

## 2018-06-21 RX ADMIN — ASPIRIN 325 MG: 325 TABLET, DELAYED RELEASE ORAL at 09:20

## 2018-06-21 RX ADMIN — ACETAMINOPHEN 650 MG: 325 TABLET ORAL at 06:14

## 2018-06-21 RX ADMIN — OXYCODONE HYDROCHLORIDE 5 MG: 5 TABLET ORAL at 22:30

## 2018-06-21 RX ADMIN — POLYETHYLENE GLYCOL (3350) 17 G: 17 POWDER, FOR SOLUTION ORAL at 09:19

## 2018-06-21 RX ADMIN — CELECOXIB 200 MG: 100 CAPSULE ORAL at 09:19

## 2018-06-21 RX ADMIN — OXYCODONE HYDROCHLORIDE 5 MG: 5 TABLET ORAL at 02:59

## 2018-06-21 RX ADMIN — OXYCODONE HYDROCHLORIDE 5 MG: 5 TABLET ORAL at 06:14

## 2018-06-21 RX ADMIN — SENNOSIDES AND DOCUSATE SODIUM 1 TABLET: 8.6; 5 TABLET ORAL at 09:20

## 2018-06-21 RX ADMIN — OXYCODONE HYDROCHLORIDE 10 MG: 5 TABLET ORAL at 12:34

## 2018-06-21 RX ADMIN — ASPIRIN 325 MG: 325 TABLET, DELAYED RELEASE ORAL at 18:20

## 2018-06-21 RX ADMIN — CELECOXIB 200 MG: 100 CAPSULE ORAL at 22:30

## 2018-06-21 RX ADMIN — SENNOSIDES AND DOCUSATE SODIUM 1 TABLET: 8.6; 5 TABLET ORAL at 18:20

## 2018-06-21 RX ADMIN — OXYCODONE HYDROCHLORIDE 10 MG: 5 TABLET ORAL at 09:19

## 2018-06-21 RX ADMIN — OXYCODONE HYDROCHLORIDE 5 MG: 5 TABLET ORAL at 15:52

## 2018-06-21 RX ADMIN — FAMOTIDINE 20 MG: 20 TABLET ORAL at 18:20

## 2018-06-21 RX ADMIN — ACETAMINOPHEN 650 MG: 325 TABLET ORAL at 18:20

## 2018-06-21 NOTE — PROGRESS NOTES
6/21/2018 2:19 PM [facility] auth is still pending per [facility] liaison. Pt notified Sallie Spikes is pending. CM will follow.  JAMARCUS Bedoya

## 2018-06-21 NOTE — PROGRESS NOTES
Problem: Falls - Risk of  Goal: *Absence of Falls  Document Janie Fall Risk and appropriate interventions in the flowsheet.    Outcome: Progressing Towards Goal  Fall Risk Interventions:  Mobility Interventions: Bed/chair exit alarm, Communicate number of staff needed for ambulation/transfer, Patient to call before getting OOB, Utilize walker, cane, or other assitive device, Utilize gait belt for transfers/ambulation         Medication Interventions: Bed/chair exit alarm, Patient to call before getting OOB, Teach patient to arise slowly, Utilize gait belt for transfers/ambulation    Elimination Interventions: Bed/chair exit alarm, Call light in reach, Patient to call for help with toileting needs, Toileting schedule/hourly rounds

## 2018-06-21 NOTE — PROGRESS NOTES
Bedside and Verbal shift change report given to Leslie Jovel RN (oncoming nurse) by Trung Rubalcava (offgoing nurse). Report included the following information SBAR, Kardex, Procedure Summary, Intake/Output, MAR, Accordion and Recent Results.

## 2018-06-21 NOTE — PROGRESS NOTES
Problem: Pressure Injury - Risk of  Goal: *Prevention of pressure injury  Document Srinivasa Scale and appropriate interventions in the flowsheet. Outcome: Progressing Towards Goal  Pressure Injury Interventions:  Sensory Interventions: Check visual cues for pain    Moisture Interventions: Absorbent underpads, Apply protective barrier, creams and emollients, Check for incontinence Q2 hours and as needed, Offer toileting Q_hr    Activity Interventions: Increase time out of bed, Pressure redistribution bed/mattress(bed type), PT/OT evaluation    Mobility Interventions: Float heels, HOB 30 degrees or less, Pressure redistribution bed/mattress (bed type), PT/OT evaluation, Turn and reposition approx.  every two hours(pillow and wedges)

## 2018-06-21 NOTE — PROGRESS NOTES
TOTAL KNEE ARTHROPLASTY DAILY NOTE     ASSESSMENT / PLAN :   1. Pain Control : acceptable  2. Overnight Issues : none  3. Wound or incisional issue : dressings C/D/I  4. Therapy / Weight Bearing Recommendations : WBAT - walker  5. DVT Prophylaxis : Mechanical and aspirin  6. Disposition : [facility]  7. Medical Concerns : A bit slow to mobilize - recommend inpatient rehab  8. Comments : anticipate transfer to [facility] this afternoon once insurance auth is obtained       POD  3 Days Post-Op s/p Procedure(s):  BILATERAL TOTAL KNEE REPLACEMENTS    Acute post-op blood-loss anemia - expected - aymptomatic  HgB 8.9     SUBJECTIVE :     Patient notes the following issues : a little slow to mobilize. OBJECTIVE :     Patient Vitals for the past 12 hrs:   BP Temp Pulse Resp SpO2   06/21/18 1140 149/66 - 92 - -   06/21/18 1130 189/79 - 92 - -   06/21/18 1115 139/63 - 87 - -   06/21/18 1107 134/72 - 85 - -   06/21/18 1055 131/72 - 80 - 99 %   06/21/18 0759 133/77 98 °F (36.7 °C) 72 16 100 %   06/21/18 0336 137/76 99.1 °F (37.3 °C) 80 16 98 %   06/21/18 0017 117/55 99.4 °F (37.4 °C) 82 16 98 %       Alert and oriented x3. LEFT KNEE  Examination of the left knee reveals that the dressing is clean, dry and intact. Motor Exam is intact and normal. Pt is not able to perform a straight leg raise. Sensation is intact to light touch. No calf pain. RIGHT KNEE  Examination of the right knee reveals that the dressing is clean, dry and intact. Motor Exam is intact and normal. Pt is not able to perform a straight leg raise. Sensation is intact to light touch. No calf pain.        Labs:  Recent Labs      06/20/18   0458  06/19/18   0537   HGB  8.9*  10.3*   NA   --   140   K   --   4.3   CL   --   108   CO2   --   28   BUN   --   8   CREA   --   0.69   GLU   --   107*      Patient mobility  Gait  Base of Support: Widened  Speed/Luci: Pace decreased (<100 feet/min)  Gait Abnormalities: Decreased step clearance, Step to gait  Ambulation - Level of Assistance: Minimal assistance, Assist x2  Distance (ft): 12 Feet (ft)  Assistive Device: Walker, rolling, Gait belt        Henok Guzmán MD  Cell [phone]  Jenna Corea PA-C  Cell [phone]  Medical Staff : Filiberto Thompson  Office : [phone]

## 2018-06-21 NOTE — PROGRESS NOTES
Problem: Mobility Impaired (Adult and Pediatric)  Goal: *Acute Goals and Plan of Care (Insert Text)  Physical Therapy Goals  Initiated 6/19/2018    1. Patient will move from supine to sit and sit to supine , scoot up and down and roll side to side in bed with modified independence within 7 days. 2. Patient will perform sit <> stand with mod assist x 1 within 7 days. 3. Patient will ambulate with min assist for 10 feet with the least restrictive device within 7 days. 4. Patient will ascend/descend 4 stairs with 1 handrail(s) with moderate assistance  within 7 days. 5. Patient will perform home exercise program per protocol with modified independence within 7 days. 6. Patient will demonstrate AROM 0-90 degrees in operative joint within 7 days. physical Therapy TREATMENT  Patient: Alondra Lau [de-identified]77 y.o. female)  Date: 6/21/2018  Diagnosis: DEGENERATIVE JOINT DISEASE BILATERAL  Primary osteoarthritis of both knees <principal problem not specified>  Procedure(s) (LRB):  BILATERAL TOTAL KNEE REPLACEMENTS (Bilateral) 3 Days Post-Op  Precautions: Bed Alarm, Fall, WBAT  Chart, physical therapy assessment, plan of care and goals were reviewed. ASSESSMENT:  Pt received in bed, agreeable to participate with physical therapy Reviewed HEP in supine, demonstrates fair QS, heel slides, requires Max A for SLR. Min A x1 to manage BLE to come to sitting EOB denies dizziness. Lightheadedness with positional changes. BP stable. Sit<>stand from elevated surface with min A x 2 using RW for steadying and verbal cues for sequencing. SPT >BSC with Min A x2 +void/BM. Max A for hygiene in standing. Mod A to manage clothes. Gait training x 12',around end of bed to chair with min A x2 verbal cues for sequencing. Pt returned to chair, agreeable to sit fro additional 15 min, DANIELS present. RN notified. BPstable throughout session.  Pt would benefit from inpt rehab and tolerate 3 hrs therapy daily to improve functional mobility and increase activity tolerance. Progression toward goals:  []      Improving appropriately and progressing toward goals  [x]      Improving slowly and progressing toward goals  []      Not making progress toward goals and plan of care will be adjusted     PLAN:  Patient continues to benefit from skilled intervention to address the above impairments. Continue treatment per established plan of care. Discharge Recommendations:  Inpatient Rehab  Further Equipment Recommendations for Discharge:  Defer to rehab     SUBJECTIVE:   Patient stated [employer] would like to try to get to [facility].     OBJECTIVE DATA SUMMARY:   Critical Behavior:  Neurologic State: Alert  Orientation Level: Oriented X4  Cognition: Appropriate decision making, Appropriate for age attention/concentration, Appropriate safety awareness, Follows commands  Safety/Judgement: Awareness of environment, Fall prevention  Range of Motion:                          Functional Mobility Training:  Bed Mobility:     Supine to Sit: Minimum assistance     Scooting: Contact guard assistance; Additional time        Transfers:  Sit to Stand: Minimum assistance;Assist x2  Stand to Sit: Minimum assistance;Assist x2     Stand Pivot Transfers: Minimal assistance  Bed to Chair: Assist x2                    Balance:  Sitting: Intact  Standing: Intact; With support  Standing - Static: Constant support;Good  Standing - Dynamic : Fair  Ambulation/Gait Training:  Distance (ft): 12 Feet (ft)  Assistive Device: Walker, rolling;Gait belt  Ambulation - Level of Assistance: Minimal assistance;Assist x2        Gait Abnormalities: Decreased step clearance; Step to gait        Base of Support: Widened     Speed/Luci: Pace decreased (<100 feet/min)                       Stairs:            Therapeutic Exercises:     EXERCISE   Sets   Reps   Active Active Assist   Passive Self ROM   Comments   Ankle Pumps   [x]                                        []                                        [] []                                           Quad Sets   [x]                                        []                                        []                                        []                                           Hamstring Sets   []                                        []                                        []                                        []                                           Short Arc Quads   []                                        []                                        []                                        []                                           Knee Extension Stretch     []                                          []                                          [x]                                          []                                           Heel Slides   [x]                                        []                                        []                                        []                                           Long Arc Quads   []                                        []                                        []                                        []                                           Knee Flexion Stretch   []                                        []                                        []                                        []                                           Straight Leg Raises   []                                        [x]                                        []                                        []                                             Pain:  Pain Scale 1: Numeric (0 - 10)  Pain Intensity 1: 5  Pain Location 1: Knee  Pain Orientation 1: Right;Left  Pain Description 1: Aching; Sore  Pain Intervention(s) 1: Medication (see MAR)  Activity Tolerance:   Good  Please refer to the flowsheet for vital signs taken during this treatment.   After treatment:   [x] Patient left in no apparent distress sitting up in chair  [] Patient left in no apparent distress in bed  [x] Call bell left within reach  [x] Nursing notified  [x] Caregiver present  [x] Chair alarm activated    COMMUNICATION/COLLABORATION:   The patients plan of care was discussed with: Certified Occupational Therapy Assistant and Registered Nurse    Esme Danielle   Time Calculation: 55 mins

## 2018-06-21 NOTE — PROGRESS NOTES
Problem: Mobility Impaired (Adult and Pediatric)  Goal: *Acute Goals and Plan of Care (Insert Text)  Physical Therapy Goals  Initiated 6/19/2018    1. Patient will move from supine to sit and sit to supine , scoot up and down and roll side to side in bed with modified independence within 7 days. 2. Patient will perform sit <> stand with mod assist x 1 within 7 days. 3. Patient will ambulate with min assist for 10 feet with the least restrictive device within 7 days. 4. Patient will ascend/descend 4 stairs with 1 handrail(s) with moderate assistance  within 7 days. 5. Patient will perform home exercise program per protocol with modified independence within 7 days. 6. Patient will demonstrate AROM 0-90 degrees in operative joint within 7 days. physical Therapy TREATMENT  Patient: Eduardo Hansen [de-identified]77 y.o. female)  Date: 6/21/2018  Diagnosis: DEGENERATIVE JOINT DISEASE BILATERAL  Primary osteoarthritis of both knees <principal problem not specified>  Procedure(s) (LRB):  BILATERAL TOTAL KNEE REPLACEMENTS (Bilateral) 3 Days Post-Op  Precautions: Bed Alarm, Fall, WBAT  Chart, physical therapy assessment, plan of care and goals were reviewed. ASSESSMENT:  Pt received in bed, agreeable to participate with physical therapy. Reports 4-5/10 pain at rest. Min A to come to sitting EOB. BP stable. Sit<>stand using RW with min A x 2 from elevated surface. Gait training x 30' using RW with min A x2, no knee buckling noted. Occasional cues for upright posture, pt adapted well. Pt returned to supine, denies dizziness, lightheadedness during activity BP stable throughout, see flowsheet. Reviewed supine HEP. Encouraged OOB to chair with nursing for dinner meal if BP remains stable. Pt lives alone in tri-level home and was independent PTA would benefit from inpt rehab to improve functional mobility to return to independent living.   Progression toward goals:  [x]      Improving appropriately and progressing toward goals  [] Improving slowly and progressing toward goals  []      Not making progress toward goals and plan of care will be adjusted     PLAN:  Patient continues to benefit from skilled intervention to address the above impairments. Continue treatment per established plan of care. Discharge Recommendations:  Inpatient Rehab  Further Equipment Recommendations for Discharge:  Defer to rehab     SUBJECTIVE:   I am happy ai am doing better    OBJECTIVE DATA SUMMARY:   Range of Motion:           RLE AROM  R Knee Flexion: 82  R Knee Extension: 10           LLE PROM  L Knee Flexion: 87  L Knee Extension: 10  Functional Mobility Training:  Bed Mobility:     Supine to Sit: Minimum assistance; Additional time  Sit to Supine: Minimum assistance  Scooting: Contact guard assistance; Additional time        Transfers:  Sit to Stand: Minimum assistance;Assist x2  Stand to Sit: Minimum assistance;Assist x2     Stand Pivot Transfers: Minimal assistance  Bed to Chair: Assist x2                    Ambulation/Gait Training:  Distance (ft): 30 Feet (ft)  Assistive Device: Walker, rolling;Gait belt  Ambulation - Level of Assistance: Minimal assistance;Assist x2        Gait Abnormalities: Decreased step clearance; Step to gait        Base of Support: Widened     Speed/Luci: Pace decreased (<100 feet/min)                       Stairs: Therapeutic Exercises:   Exercises performed per protocol. See morning treatment note for description. Pain:  Pain Scale 1: Numeric (0 - 10)  Pain Intensity 1: 7  Pain Location 1: Knee  Pain Orientation 1: Right;Left  Pain Description 1: Aching; Sore  Pain Intervention(s) 1: Medication (see MAR)  Activity Tolerance:   Good  Please refer to the flowsheet for vital signs taken during this treatment.   After treatment:   [] Patient left in no apparent distress sitting up in chair  [x] Patient left in no apparent distress in bed  [x] Call bell left within reach  [x] Nursing notified  [] Caregiver present  [x] Bed alarm activated    COMMUNICATION/COLLABORATION:   The patients plan of care was discussed with: Registered Nurse    Kulwinder Martinez   Time Calculation: 29 mins

## 2018-06-21 NOTE — PROGRESS NOTES
Problem: Self Care Deficits Care Plan (Adult)  Goal: *Acute Goals and Plan of Care (Insert Text)  Occupational Therapy Goals  Initiated 6/19/2018  1. Patient will perform lower body ADLs with supervision/set-up within 7 day(s). 2.  Patient will perform upper body ADLs standing 5 mins without fatigue or LOB with supervision/set-up within 7 day(s). 3.  Patient will perform all aspects of toileting with supervision/set-up within 7day(s). 4.  Patient will participate in upper extremity therapeutic exercise/activities with independence for 10 minutes within 7 day(s). 5.  Patient will utilize energy conservation techniques during functional activities without cues within 7 day(s). Occupational Therapy TREATMENT  Patient: Jamal Xiong [de-identified]77 y.o. female)  Date: 6/21/2018  Diagnosis: DEGENERATIVE JOINT DISEASE BILATERAL  Primary osteoarthritis of both knees <principal problem not specified>  Procedure(s) (LRB):  BILATERAL TOTAL KNEE REPLACEMENTS (Bilateral) 3 Days Post-Op  Precautions: Bed Alarm, Fall, WBAT  Chart, occupational therapy assessment, plan of care, and goals were reviewed. ASSESSMENT:  Pt dressed in pajamas, present on bedside commode with PT. She is dep for bowel/bladder hygiene. She attempted clothing management while standing using one hand. Pt with impaired balance in standing but improving. Once she ambulated around the bed and sat in the chair BP taken and 149/66. Pt agreeable to sit for about 15-20 minutes, RN aware pt up in chair. Pt has AE in her room but was not up to moving around again to try to use at this time. She stated she knows how to use the sock aide though. Pt has red theraband in her room to use for upper extremity exercises after she gets back to bed. Recommend rehab.   Progression toward goals:  [x]       Improving appropriately and progressing toward goals  []       Improving slowly and progressing toward goals  []       Not making progress toward goals and plan of care will be adjusted     PLAN:  Patient continues to benefit from skilled intervention to address the above impairments. Continue treatment per established plan of care. Discharge Recommendations:  Rehab  Further Equipment Recommendations for Discharge:  None     SUBJECTIVE:   Patient stated I am good right here for now.     OBJECTIVE DATA SUMMARY:   Cognitive/Behavioral Status:  Neurologic State: Alert  Orientation Level: Oriented X4  Cognition: Appropriate decision making             Functional Mobility and Transfers for ADLs:  Bed Mobility:  Supine to Sit: Minimum assistance  Scooting: Contact guard assistance; Additional time    Transfers:  Sit to Stand: Minimum assistance;Assist x2     Bed to Chair: Assist x2    Balance:  Sitting: Intact  Standing: Intact; With support  Standing - Static: Constant support;Good  Standing - Dynamic : Fair    ADL Intervention:       Grooming  Washing Face: Modified independent     Pt has AE in her room for dressing however was tired after toileitng and ambulation. Will defer dressing till next treatment as able. Toileting  Bowel Hygiene: Maximum assistance     Pain:  Pain Scale 1: (P) Numeric (0 - 10)  Pain Intensity 1: (P) 7  Pain Location 1: Knee  Pain Orientation 1: Right;Left  Pain Description 1: Aching; Sore  Pain Intervention(s) 1: (P) Medication (see MAR)  Activity Tolerance:   Fair  Please refer to the flowsheet for vital signs taken during this treatment.   After treatment:   [x] Patient left in no apparent distress sitting up in chair  [] Patient left in no apparent distress in bed  [x] Call bell left within reach  [] Nursing notified  [] Caregiver present  [] Bed alarm activated    COMMUNICATION/COLLABORATION:   The patients plan of care was discussed with: Physical Therapy Assistant and Occupational Therapist    FRANCISCO De La Vega  Time Calculation: 30 mins

## 2018-06-22 ENCOUNTER — HOSPITAL ENCOUNTER (OUTPATIENT)
Dept: REHABILITATION | Age: 68
End: 2018-07-03
Attending: PHYSICAL MEDICINE & REHABILITATION | Admitting: PHYSICAL MEDICINE & REHABILITATION

## 2018-06-22 VITALS
HEART RATE: 81 BPM | DIASTOLIC BLOOD PRESSURE: 76 MMHG | HEIGHT: 66 IN | SYSTOLIC BLOOD PRESSURE: 124 MMHG | WEIGHT: 179.23 LBS | TEMPERATURE: 98.1 F | OXYGEN SATURATION: 98 % | RESPIRATION RATE: 16 BRPM | BODY MASS INDEX: 28.81 KG/M2

## 2018-06-22 LAB
APPEARANCE UR: CLEAR
BACTERIA URNS QL MICRO: NEGATIVE /HPF
BILIRUB UR QL: NEGATIVE
COLOR UR: ABNORMAL
EPITH CASTS URNS QL MICRO: ABNORMAL /LPF
GLUCOSE UR STRIP.AUTO-MCNC: NEGATIVE MG/DL
HGB UR QL STRIP: NEGATIVE
KETONES UR QL STRIP.AUTO: NEGATIVE MG/DL
LEUKOCYTE ESTERASE UR QL STRIP.AUTO: ABNORMAL
NITRITE UR QL STRIP.AUTO: NEGATIVE
PH UR STRIP: 7 [PH] (ref 5–8)
PROT UR STRIP-MCNC: NEGATIVE MG/DL
RBC #/AREA URNS HPF: ABNORMAL /HPF (ref 0–5)
SP GR UR REFRACTOMETRY: <1.005 (ref 1–1.03)
UR CULT HOLD, URHOLD: NORMAL
UROBILINOGEN UR QL STRIP.AUTO: 1 EU/DL (ref 0.2–1)
WBC URNS QL MICRO: ABNORMAL /HPF (ref 0–4)

## 2018-06-22 PROCEDURE — 97116 GAIT TRAINING THERAPY: CPT

## 2018-06-22 PROCEDURE — 74011250637 HC RX REV CODE- 250/637: Performed by: PHYSICIAN ASSISTANT

## 2018-06-22 PROCEDURE — 97535 SELF CARE MNGMENT TRAINING: CPT

## 2018-06-22 PROCEDURE — 74011250637 HC RX REV CODE- 250/637: Performed by: ORTHOPAEDIC SURGERY

## 2018-06-22 PROCEDURE — 97530 THERAPEUTIC ACTIVITIES: CPT

## 2018-06-22 PROCEDURE — 81001 URINALYSIS AUTO W/SCOPE: CPT | Performed by: STUDENT IN AN ORGANIZED HEALTH CARE EDUCATION/TRAINING PROGRAM

## 2018-06-22 RX ADMIN — OXYCODONE HYDROCHLORIDE 10 MG: 5 TABLET ORAL at 12:57

## 2018-06-22 RX ADMIN — SENNOSIDES AND DOCUSATE SODIUM 1 TABLET: 8.6; 5 TABLET ORAL at 08:53

## 2018-06-22 RX ADMIN — OXYCODONE HYDROCHLORIDE 5 MG: 5 TABLET ORAL at 06:07

## 2018-06-22 RX ADMIN — ACETAMINOPHEN 650 MG: 325 TABLET ORAL at 12:09

## 2018-06-22 RX ADMIN — ASPIRIN 325 MG: 325 TABLET, DELAYED RELEASE ORAL at 08:52

## 2018-06-22 RX ADMIN — OXYCODONE HYDROCHLORIDE 10 MG: 5 TABLET ORAL at 09:02

## 2018-06-22 RX ADMIN — CELECOXIB 200 MG: 100 CAPSULE ORAL at 08:53

## 2018-06-22 RX ADMIN — ACETAMINOPHEN 650 MG: 325 TABLET ORAL at 06:00

## 2018-06-22 RX ADMIN — ACETAMINOPHEN 650 MG: 325 TABLET ORAL at 00:43

## 2018-06-22 RX ADMIN — FAMOTIDINE 20 MG: 20 TABLET ORAL at 08:53

## 2018-06-22 RX ADMIN — POLYETHYLENE GLYCOL (3350) 17 G: 17 POWDER, FOR SOLUTION ORAL at 08:53

## 2018-06-22 NOTE — PROGRESS NOTES
Bedside and Verbal shift change report given to CRISTIANA Batista (oncoming nurse) by Hal Bee (offgoing nurse). Report included the following information SBAR, Kardex, Procedure Summary, Intake/Output, MAR and Recent Results.

## 2018-06-22 NOTE — PROGRESS NOTES
Bedside and Verbal shift change report given to Fox Lopez (oncoming nurse) by Ck Reyes (offgoing nurse). Report included the following information SBAR, Kardex, OR Summary, Intake/Output, MAR and Recent Results.

## 2018-06-22 NOTE — PROGRESS NOTES
Problem: Self Care Deficits Care Plan (Adult)  Goal: *Acute Goals and Plan of Care (Insert Text)  Occupational Therapy Goals  Initiated 6/19/2018  1. Patient will perform lower body ADLs with supervision/set-up within 7 day(s). 2.  Patient will perform upper body ADLs standing 5 mins without fatigue or LOB with supervision/set-up within 7 day(s). 3.  Patient will perform all aspects of toileting with supervision/set-up within 7day(s). 4.  Patient will participate in upper extremity therapeutic exercise/activities with independence for 10 minutes within 7 day(s). 5.  Patient will utilize energy conservation techniques during functional activities without cues within 7 day(s). Occupational Therapy TREATMENT  Patient: Dev Segura [de-identified]77 y.o. female)  Date: 6/22/2018  Diagnosis: DEGENERATIVE JOINT DISEASE BILATERAL  Primary osteoarthritis of both knees <principal problem not specified>  Procedure(s) (LRB):  BILATERAL TOTAL KNEE REPLACEMENTS (Bilateral) 4 Days Post-Op  Precautions: Bed Alarm, Fall, WBAT  Chart, occupational therapy assessment, plan of care, and goals were reviewed. ASSESSMENT:  Pt seated in chair agreeable to OT. She was able to wash her face, anterior upper body with set-up, she donned buttoned pajamas with set-up as well. Pt used reacher to don pajama pants over both feet, and min assist to pull pants up over hips using one hand to task. Pt than wanted to ambulate to bathroom to stand and use mouthwash for oral care, apply cream to her face. After activity /65. Pt scheduled to go to rehab later today. Progression toward goals:  [x]       Improving appropriately and progressing toward goals  []       Improving slowly and progressing toward goals  []       Not making progress toward goals and plan of care will be adjusted     PLAN:  Patient continues to benefit from skilled intervention to address the above impairments. Continue treatment per established plan of care.   Discharge Recommendations:  Rehab  Further Equipment Recommendations for Discharge: None     SUBJECTIVE:   Patient stated I would like to change my clothes.     OBJECTIVE DATA SUMMARY:   Cognitive/Behavioral Status:  Neurologic State: Alert; Appropriate for age  Orientation Level: Oriented X4  Cognition: Follows commands             Functional Mobility and Transfers for ADLs:  Bed Mobility:  Supine to Sit: Minimum assistance; Additional time  Scooting: Contact guard assistance    Transfers:  Sit to Stand: Minimum assistance     Bed to Chair: Additional time    Balance:  Sitting: Intact  Standing: Intact; With support  Standing - Static: Constant support;Good  Standing - Dynamic : Fair    ADL Intervention:       Grooming  Washing Face: Modified independent  Brushing Teeth: Contact guard assistance (standing at sink to use mouth wash)    Upper Body Bathing  Bathing Assistance: Supervision/set-up  Position Performed: Seated in chair    Lower Body Bathing  Bathing Assistance: Contact guard assistance  Perineal  : Contact guard assistance  Position Performed: Standing  Lower Body : Minimum assistance  Position Performed: Seated in chair  Cues: Physical assistance    Upper Body Dressing Assistance  Dressing Assistance: Supervision/set-up  Front Opened Shirt: Supervision/set-up    Lower Body Dressing Assistance  Dressing Assistance: Minimum assistance  Pants With Elastic Waist: Minimum assistance  Leg Crossed Method Used: No  Position Performed: Seated in chair          Pain:  Pain Scale 1: Numeric (0 - 10)  Pain Intensity 1: 7  Pain Location 1: Knee  Pain Orientation 1: Right;Left  Pain Description 1: Aching  Pain Intervention(s) 1: Medication (see MAR)  Activity Tolerance:   No signs/symptoms of distress or discomfort during OT  Please refer to the flowsheet for vital signs taken during this treatment.   After treatment:   [x] Patient left in no apparent distress sitting up in chair  [] Patient left in no apparent distress in bed  [x] Call bell left within reach  [] Nursing notified  [] Caregiver present  [] Bed alarm activated    COMMUNICATION/COLLABORATION:   The patients plan of care was discussed with: Physical Therapy Assistant and Occupational Therapist    JEREMIAH Diaz/L  Time Calculation: 34 mins

## 2018-06-22 NOTE — PROGRESS NOTES
Problem: Mobility Impaired (Adult and Pediatric)  Goal: *Acute Goals and Plan of Care (Insert Text)  Physical Therapy Goals  Initiated 6/19/2018    1. Patient will move from supine to sit and sit to supine , scoot up and down and roll side to side in bed with modified independence within 7 days. 2. Patient will perform sit <> stand with mod assist x 1 within 7 days. 3. Patient will ambulate with min assist for 10 feet with the least restrictive device within 7 days. 4. Patient will ascend/descend 4 stairs with 1 handrail(s) with moderate assistance  within 7 days. 5. Patient will perform home exercise program per protocol with modified independence within 7 days. 6. Patient will demonstrate AROM 0-90 degrees in operative joint within 7 days. physical Therapy TREATMENT  Patient: Dami Jacinto [facility]77 y.o. female)  Date: 6/22/2018  Diagnosis: DEGENERATIVE JOINT DISEASE BILATERAL  Primary osteoarthritis of both knees <principal problem not specified>  Procedure(s) (LRB):  BILATERAL TOTAL KNEE REPLACEMENTS (Bilateral) 4 Days Post-Op  Precautions: Bed Alarm, Fall, WBAT  Chart, physical therapy assessment, plan of care and goals were reviewed. ASSESSMENT:  Pt received in bed, agreeable to participate with physical therapy. Min A to manage BLE to EOB with increased time to compete task. BP stable with transitional mobility. Sit<>stand with mod A x1 from elevated surface. SPT to SBA with min A x1, able to perform hygiene in standing with occasional UE support with CGA, gait training x 75' using RW with CGA x1, demonstrates slow but steady gait. Pt returned to chair, to work with OT. BP stable. Progression toward goals:  []      Improving appropriately and progressing toward goals  [x]      Improving slowly and progressing toward goals  []      Not making progress toward goals and plan of care will be adjusted     PLAN:  Patient continues to benefit from skilled intervention to address the above impairments. Continue treatment per established plan of care. Discharge Recommendations:  Inpatient Rehab  Further Equipment Recommendations for Discharge:  Defer to rehab     SUBJECTIVE:   Patient stated I am doing ok.     OBJECTIVE DATA SUMMARY:   Critical Behavior:  Neurologic State: Alert  Orientation Level: Oriented X4  Cognition: Appropriate decision making  Safety/Judgement: Awareness of environment, Fall prevention  Range of Motion:           RLE AROM  R Knee Flexion: 85  R Knee Extension: 10           LLE PROM  L Knee Flexion: 85  L Knee Extension: 10  Functional Mobility Training:  Bed Mobility:     Supine to Sit: Minimum assistance; Additional time     Scooting: Contact guard assistance        Transfers:  Sit to Stand: Minimum assistance  Stand to Sit: Minimum assistance     Stand Pivot Transfers: Minimal assistance  Bed to Chair: Additional time                    Balance:  Sitting: Intact  Standing: Intact; With support  Standing - Static: Constant support;Good  Standing - Dynamic : Fair  Ambulation/Gait Training:  Distance (ft): 75 Feet (ft)  Assistive Device: Gait belt;Walker, rolling  Ambulation - Level of Assistance: Contact guard assistance        Gait Abnormalities: Step to gait        Base of Support: Widened     Speed/Luci: Pace decreased (<100 feet/min)                       Stairs:            Therapeutic Exercises:     EXERCISE   Sets   Reps   Active Active Assist   Passive Self ROM   Comments   Ankle Pumps   [x]                                        []                                        []                                        []                                           Quad Sets   [x]                                        []                                        []                                        []                                           Hamstring Sets   []                                        []                                        []                                        [] Short Arc Quads   []                                        []                                        []                                        []                                           Knee Extension Stretch     []                                          []                                          []                                          []                                           Heel Slides   [x]                                        []                                        []                                        []                                           Long Arc Quads   []                                        []                                        []                                        []                                           Knee Flexion Stretch   []                                        []                                        []                                        []                                           Straight Leg Raises   []                                        [x]                                        []                                        []                                             Pain:  Pain Scale 1: Numeric (0 - 10)  Pain Intensity 1: 6  Pain Location 1: Knee  Pain Orientation 1: Left;Right  Pain Description 1: Aching  Pain Intervention(s) 1: Medication (see MAR)  Activity Tolerance:   Good  Please refer to the flowsheet for vital signs taken during this treatment.   After treatment:   [x] Patient left in no apparent distress sitting up in chair  [] Patient left in no apparent distress in bed  [x] Call bell left within reach  [x] Nursing notified  [] Caregiver present  [] Bed alarm activated    COMMUNICATION/COLLABORATION:   The patients plan of care was discussed with: Certified Occupational Therapy Assistant and Registered Nurse    Alena Ferris   Time Calculation: 30 mins

## 2018-06-22 NOTE — PROGRESS NOTES
6/22/2018 11:58 AM Pt will be going to room 404B at [facility]. Nursing please call report to [phone].      6/22/2018  10:30 AM [facility] received auth and can accept today. Awaiting bed assignment. 6/22/2018  9:03 AM CM called pt's insurance, [employer], spoke with [employer]. Pt's auth for [facility] is still pending in their system. Mallory reported Shona Schroeder takes 72 hours for their auth process. Since auth was started on 6/19, hopeful auth will come through today. CM will continue to follow up.     6/22/2018  8:52 AM [facility] placement pending auth. CM called and lvm with [facility] liaison regarding pending auth. CM will follow.  JAMARCUS Elaine

## 2018-06-22 NOTE — PROGRESS NOTES
Pt is stable and doing well overall. If the [facility] placement is not approved please start on [address] rehab placement vs other rehab placement [place].      Genaro Dominique MD

## 2018-06-22 NOTE — PROGRESS NOTES
Problem: Falls - Risk of  Goal: *Absence of Falls  Document Janie Fall Risk and appropriate interventions in the flowsheet.    Outcome: Progressing Towards Goal  Fall Risk Interventions:  Mobility Interventions: Utilize walker, cane, or other assitive device         Medication Interventions: Patient to call before getting OOB    Elimination Interventions: Call light in reach

## 2018-06-23 LAB
25(OH)D3 SERPL-MCNC: 22.1 NG/ML (ref 30–100)
ALBUMIN SERPL-MCNC: 2.7 G/DL (ref 3.5–5)
ALBUMIN/GLOB SERPL: 0.8 {RATIO} (ref 1.1–2.2)
ALP SERPL-CCNC: 72 U/L (ref 45–117)
ALT SERPL-CCNC: 41 U/L (ref 12–78)
ANION GAP SERPL CALC-SCNC: 8 MMOL/L (ref 5–15)
AST SERPL-CCNC: 35 U/L (ref 15–37)
BILIRUB SERPL-MCNC: 0.6 MG/DL (ref 0.2–1)
BUN SERPL-MCNC: 7 MG/DL (ref 6–20)
BUN/CREAT SERPL: 11 (ref 12–20)
CALCIUM SERPL-MCNC: 9.4 MG/DL (ref 8.5–10.1)
CHLORIDE SERPL-SCNC: 104 MMOL/L (ref 97–108)
CO2 SERPL-SCNC: 26 MMOL/L (ref 21–32)
CREAT SERPL-MCNC: 0.64 MG/DL (ref 0.55–1.02)
ERYTHROCYTE [DISTWIDTH] IN BLOOD BY AUTOMATED COUNT: 13.2 % (ref 11.5–14.5)
GLOBULIN SER CALC-MCNC: 3.6 G/DL (ref 2–4)
GLUCOSE SERPL-MCNC: 92 MG/DL (ref 65–100)
HCT VFR BLD AUTO: 28 % (ref 35–47)
HGB BLD-MCNC: 9.1 G/DL (ref 11.5–16)
MAGNESIUM SERPL-MCNC: 2.1 MG/DL (ref 1.6–2.4)
MCH RBC QN AUTO: 30.2 PG (ref 26–34)
MCHC RBC AUTO-ENTMCNC: 32.5 G/DL (ref 30–36.5)
MCV RBC AUTO: 93 FL (ref 80–99)
NRBC # BLD: 0 K/UL (ref 0–0.01)
NRBC BLD-RTO: 0 PER 100 WBC
PLATELET # BLD AUTO: 279 K/UL (ref 150–400)
PMV BLD AUTO: 9.1 FL (ref 8.9–12.9)
POTASSIUM SERPL-SCNC: 4.1 MMOL/L (ref 3.5–5.1)
PROT SERPL-MCNC: 6.3 G/DL (ref 6.4–8.2)
RBC # BLD AUTO: 3.01 M/UL (ref 3.8–5.2)
SODIUM SERPL-SCNC: 138 MMOL/L (ref 136–145)
WBC # BLD AUTO: 7.6 K/UL (ref 3.6–11)

## 2018-06-23 PROCEDURE — 36415 COLL VENOUS BLD VENIPUNCTURE: CPT | Performed by: STUDENT IN AN ORGANIZED HEALTH CARE EDUCATION/TRAINING PROGRAM

## 2018-06-23 PROCEDURE — 82306 VITAMIN D 25 HYDROXY: CPT | Performed by: STUDENT IN AN ORGANIZED HEALTH CARE EDUCATION/TRAINING PROGRAM

## 2018-06-23 PROCEDURE — 80053 COMPREHEN METABOLIC PANEL: CPT | Performed by: STUDENT IN AN ORGANIZED HEALTH CARE EDUCATION/TRAINING PROGRAM

## 2018-06-23 PROCEDURE — 83735 ASSAY OF MAGNESIUM: CPT | Performed by: STUDENT IN AN ORGANIZED HEALTH CARE EDUCATION/TRAINING PROGRAM

## 2018-06-23 PROCEDURE — 85027 COMPLETE CBC AUTOMATED: CPT | Performed by: STUDENT IN AN ORGANIZED HEALTH CARE EDUCATION/TRAINING PROGRAM

## 2018-06-25 ENCOUNTER — PATIENT OUTREACH (OUTPATIENT)
Dept: OTHER | Age: 68
End: 2018-06-25

## 2018-06-25 LAB
ANION GAP SERPL CALC-SCNC: 8 MMOL/L (ref 5–15)
BUN SERPL-MCNC: 13 MG/DL (ref 6–20)
BUN/CREAT SERPL: 20 (ref 12–20)
CALCIUM SERPL-MCNC: 9.8 MG/DL (ref 8.5–10.1)
CHLORIDE SERPL-SCNC: 102 MMOL/L (ref 97–108)
CO2 SERPL-SCNC: 28 MMOL/L (ref 21–32)
CREAT SERPL-MCNC: 0.64 MG/DL (ref 0.55–1.02)
ERYTHROCYTE [DISTWIDTH] IN BLOOD BY AUTOMATED COUNT: 13.1 % (ref 11.5–14.5)
GLUCOSE SERPL-MCNC: 97 MG/DL (ref 65–100)
HCT VFR BLD AUTO: 27.7 % (ref 35–47)
HGB BLD-MCNC: 9 G/DL (ref 11.5–16)
MCH RBC QN AUTO: 30.4 PG (ref 26–34)
MCHC RBC AUTO-ENTMCNC: 32.5 G/DL (ref 30–36.5)
MCV RBC AUTO: 93.6 FL (ref 80–99)
NRBC # BLD: 0 K/UL (ref 0–0.01)
NRBC BLD-RTO: 0 PER 100 WBC
PLATELET # BLD AUTO: 343 K/UL (ref 150–400)
PMV BLD AUTO: 9.2 FL (ref 8.9–12.9)
POTASSIUM SERPL-SCNC: 4.3 MMOL/L (ref 3.5–5.1)
RBC # BLD AUTO: 2.96 M/UL (ref 3.8–5.2)
SODIUM SERPL-SCNC: 138 MMOL/L (ref 136–145)
WBC # BLD AUTO: 6.9 K/UL (ref 3.6–11)

## 2018-06-25 PROCEDURE — 36415 COLL VENOUS BLD VENIPUNCTURE: CPT | Performed by: STUDENT IN AN ORGANIZED HEALTH CARE EDUCATION/TRAINING PROGRAM

## 2018-06-25 PROCEDURE — 85027 COMPLETE CBC AUTOMATED: CPT | Performed by: STUDENT IN AN ORGANIZED HEALTH CARE EDUCATION/TRAINING PROGRAM

## 2018-06-25 PROCEDURE — 80048 BASIC METABOLIC PNL TOTAL CA: CPT | Performed by: STUDENT IN AN ORGANIZED HEALTH CARE EDUCATION/TRAINING PROGRAM

## 2018-06-25 NOTE — PROGRESS NOTES
Patient on [facility] discharge report dated 6/25. Pt discharged from [facility] for bilateral knee replacement, transferred to [address] [place] [address]. Pt states she is doing well, unable to speak at length as nurse is present with her. This CM informaed pt that would be assisting with any discharge needs, pt agreeable. This CM will contact in 4 days to discuss any discharge plans.

## 2018-06-26 NOTE — DISCHARGE SUMMARY
@7PCEW@ [address] [place] [id number]    DISCHARGE SUMMARY     Patient: Gerry Nixon                             Medical Record Number: [id number]                : 1950  Age: 77 y.o. Admit Date: 2018  Discharge Date: 2018    Admission Diagnosis: DEGENERATIVE JOINT DISEASE BILATERAL  Primary osteoarthritis of both knees  Discharge Diagnosis: DEGENERATIVE JOINT DISEASE BILATERAL    Procedures: Procedure(s):   [facility] REPLACEMENTS    Surgeon: Maria Ines Amato MD  Assistants: Stan Herman PA-C    Anesthesia: spinal  Complications: None     History of Present Illness: Geryr Nixon is a 77 y.o. female with a history of Bilateral knee pain, swelling, and marked loss of function. Despite conservative management and after clinical and radiographic evaluation, it was determined that she suffered from end-stage osteoarthritis and would benefit from Procedure(s):  [address], which she consented to undergo after a discussion of the risks, benefits, alternatives, rehab concerns, and potential complications of surgery. Hospital Course: Gerry Nixon tolerated the procedure well. She was transferred  to the recovery room in stable condition. After a brief stay the patient was then transferred to the Joint Replacement Unit at [address] [place] [address]. On postoperative day #1, the dressing was clean and dry, she was neurovascularly intact. The patient was afebrile and vital signs were stable. Calves were soft and non-tender bilaterally. On postoperative day  # 2, the patient was tolerating a regular diet and making satisfactory progress with physical therapy.       Hemoglobin and INR prior to discharge were   Lab Results   Component Value Date/Time    HGB 9.0 (L) 2018 02:00 AM    INR 1.0 2017 02:37 PM       Gerry Nixon was cleared by physical therapy and was discharged to [facility] in stable condition on postoperative day 4. She was provided with routine postoperative instructions and advised to follow up in my office in 2 weeks following discharge from the hospital.  She was prescribed aspirin for DVT prophylaxis, tramadol and oxycodone for post-operative pain, dulcolax suppository for constipation, and zofran for nausea. Discharge Medications: This patient is currently admitted, however, discharge medications can only be displayed within the current admission encounter.       Signed by: Racquel Price MD  6/26/2018

## 2018-06-28 LAB
ANION GAP SERPL CALC-SCNC: 6 MMOL/L (ref 5–15)
BUN SERPL-MCNC: 15 MG/DL (ref 6–20)
BUN/CREAT SERPL: 30 (ref 12–20)
CALCIUM SERPL-MCNC: 9.3 MG/DL (ref 8.5–10.1)
CHLORIDE SERPL-SCNC: 101 MMOL/L (ref 97–108)
CO2 SERPL-SCNC: 26 MMOL/L (ref 21–32)
CREAT SERPL-MCNC: 0.5 MG/DL (ref 0.55–1.02)
ERYTHROCYTE [DISTWIDTH] IN BLOOD BY AUTOMATED COUNT: 13.3 % (ref 11.5–14.5)
GLUCOSE SERPL-MCNC: 92 MG/DL (ref 65–100)
HCT VFR BLD AUTO: 27.2 % (ref 35–47)
HGB BLD-MCNC: 8.8 G/DL (ref 11.5–16)
MCH RBC QN AUTO: 30.6 PG (ref 26–34)
MCHC RBC AUTO-ENTMCNC: 32.4 G/DL (ref 30–36.5)
MCV RBC AUTO: 94.4 FL (ref 80–99)
NRBC # BLD: 0 K/UL (ref 0–0.01)
NRBC BLD-RTO: 0 PER 100 WBC
PLATELET # BLD AUTO: 489 K/UL (ref 150–400)
PMV BLD AUTO: 8.6 FL (ref 8.9–12.9)
POTASSIUM SERPL-SCNC: 5.3 MMOL/L (ref 3.5–5.1)
RBC # BLD AUTO: 2.88 M/UL (ref 3.8–5.2)
SODIUM SERPL-SCNC: 133 MMOL/L (ref 136–145)
WBC # BLD AUTO: 7.9 K/UL (ref 3.6–11)

## 2018-06-28 PROCEDURE — 80048 BASIC METABOLIC PNL TOTAL CA: CPT | Performed by: STUDENT IN AN ORGANIZED HEALTH CARE EDUCATION/TRAINING PROGRAM

## 2018-06-28 PROCEDURE — 85027 COMPLETE CBC AUTOMATED: CPT | Performed by: STUDENT IN AN ORGANIZED HEALTH CARE EDUCATION/TRAINING PROGRAM

## 2018-06-28 PROCEDURE — 36415 COLL VENOUS BLD VENIPUNCTURE: CPT | Performed by: STUDENT IN AN ORGANIZED HEALTH CARE EDUCATION/TRAINING PROGRAM

## 2018-06-29 ENCOUNTER — PATIENT OUTREACH (OUTPATIENT)
Dept: OTHER | Age: 68
End: 2018-06-29

## 2018-06-29 NOTE — PROGRESS NOTES
Attempt to reach patient for follow up while in rehabilitation for bilateral knee replacements. Discreet VM left with contact information.

## 2018-06-29 NOTE — PROGRESS NOTES
Incoming call received from patient. Two patient identifiers verified. Patient is continuing with therapy at [facility] inpatient joint replacement unit. Reports she is working with PT/OT, physicians and nurses for her plan of care and pain management. Care manager also assisting with discharge plan, possibility of next Tuesday July 3rd. DME needs- has transition bench and shower chair, as well as walker at home on both floors of home. Stair railing installed at home, lives in town home with 4-5 steps to entrance, and stairs to second story. Pt states her sister will be assisting her, and friends are assisting with other needs as well. States she is working with PT \"hard\" on steps up and down, as well as getting in and out of car. Plan for [facility] with [facility] for PT/OT. No follow-up appointments not scheduled at this time, this CM will assist after discharge home. Will contact patient on July 5th.

## 2018-07-01 LAB
ANION GAP SERPL CALC-SCNC: 8 MMOL/L (ref 5–15)
BUN SERPL-MCNC: 12 MG/DL (ref 6–20)
BUN/CREAT SERPL: 18 (ref 12–20)
CALCIUM SERPL-MCNC: 9.9 MG/DL (ref 8.5–10.1)
CHLORIDE SERPL-SCNC: 98 MMOL/L (ref 97–108)
CO2 SERPL-SCNC: 28 MMOL/L (ref 21–32)
CREAT SERPL-MCNC: 0.66 MG/DL (ref 0.55–1.02)
ERYTHROCYTE [DISTWIDTH] IN BLOOD BY AUTOMATED COUNT: 13.4 % (ref 11.5–14.5)
GLUCOSE SERPL-MCNC: 101 MG/DL (ref 65–100)
HCT VFR BLD AUTO: 29.3 % (ref 35–47)
HGB BLD-MCNC: 9.4 G/DL (ref 11.5–16)
MCH RBC QN AUTO: 29.9 PG (ref 26–34)
MCHC RBC AUTO-ENTMCNC: 32.1 G/DL (ref 30–36.5)
MCV RBC AUTO: 93.3 FL (ref 80–99)
NRBC # BLD: 0 K/UL (ref 0–0.01)
NRBC BLD-RTO: 0 PER 100 WBC
PLATELET # BLD AUTO: 607 K/UL (ref 150–400)
PMV BLD AUTO: 8.3 FL (ref 8.9–12.9)
POTASSIUM SERPL-SCNC: 3.9 MMOL/L (ref 3.5–5.1)
RBC # BLD AUTO: 3.14 M/UL (ref 3.8–5.2)
SODIUM SERPL-SCNC: 134 MMOL/L (ref 136–145)
WBC # BLD AUTO: 9.2 K/UL (ref 3.6–11)

## 2018-07-01 PROCEDURE — 36415 COLL VENOUS BLD VENIPUNCTURE: CPT | Performed by: PHYSICAL MEDICINE & REHABILITATION

## 2018-07-01 PROCEDURE — 80048 BASIC METABOLIC PNL TOTAL CA: CPT | Performed by: PHYSICAL MEDICINE & REHABILITATION

## 2018-07-01 PROCEDURE — 85027 COMPLETE CBC AUTOMATED: CPT | Performed by: PHYSICAL MEDICINE & REHABILITATION

## 2018-07-02 LAB
ANION GAP SERPL CALC-SCNC: 6 MMOL/L (ref 5–15)
BUN SERPL-MCNC: 14 MG/DL (ref 6–20)
BUN/CREAT SERPL: 23 (ref 12–20)
CALCIUM SERPL-MCNC: 9.8 MG/DL (ref 8.5–10.1)
CHLORIDE SERPL-SCNC: 101 MMOL/L (ref 97–108)
CO2 SERPL-SCNC: 29 MMOL/L (ref 21–32)
CREAT SERPL-MCNC: 0.61 MG/DL (ref 0.55–1.02)
ERYTHROCYTE [DISTWIDTH] IN BLOOD BY AUTOMATED COUNT: 13.2 % (ref 11.5–14.5)
GLUCOSE SERPL-MCNC: 82 MG/DL (ref 65–100)
HCT VFR BLD AUTO: 28.5 % (ref 35–47)
HGB BLD-MCNC: 9 G/DL (ref 11.5–16)
MCH RBC QN AUTO: 29.6 PG (ref 26–34)
MCHC RBC AUTO-ENTMCNC: 31.6 G/DL (ref 30–36.5)
MCV RBC AUTO: 93.8 FL (ref 80–99)
NRBC # BLD: 0 K/UL (ref 0–0.01)
NRBC BLD-RTO: 0 PER 100 WBC
PLATELET # BLD AUTO: 500 K/UL (ref 150–400)
PMV BLD AUTO: 9.1 FL (ref 8.9–12.9)
POTASSIUM SERPL-SCNC: 4 MMOL/L (ref 3.5–5.1)
RBC # BLD AUTO: 3.04 M/UL (ref 3.8–5.2)
SODIUM SERPL-SCNC: 136 MMOL/L (ref 136–145)
WBC # BLD AUTO: 7.9 K/UL (ref 3.6–11)

## 2018-07-02 PROCEDURE — 85027 COMPLETE CBC AUTOMATED: CPT | Performed by: STUDENT IN AN ORGANIZED HEALTH CARE EDUCATION/TRAINING PROGRAM

## 2018-07-02 PROCEDURE — 80048 BASIC METABOLIC PNL TOTAL CA: CPT | Performed by: STUDENT IN AN ORGANIZED HEALTH CARE EDUCATION/TRAINING PROGRAM

## 2018-07-02 PROCEDURE — 36415 COLL VENOUS BLD VENIPUNCTURE: CPT | Performed by: STUDENT IN AN ORGANIZED HEALTH CARE EDUCATION/TRAINING PROGRAM

## 2018-07-03 ENCOUNTER — HOME HEALTH ADMISSION (OUTPATIENT)
Dept: HOME HEALTH SERVICES | Facility: HOME HEALTH | Age: 68
End: 2018-07-03
Payer: COMMERCIAL

## 2018-07-04 ENCOUNTER — HOME CARE VISIT (OUTPATIENT)
Dept: SCHEDULING | Facility: HOME HEALTH | Age: 68
End: 2018-07-04
Payer: COMMERCIAL

## 2018-07-04 VITALS
RESPIRATION RATE: 16 BRPM | DIASTOLIC BLOOD PRESSURE: 80 MMHG | SYSTOLIC BLOOD PRESSURE: 136 MMHG | TEMPERATURE: 98 F | OXYGEN SATURATION: 98 % | HEART RATE: 89 BPM

## 2018-07-04 PROCEDURE — 400013 HH SOC

## 2018-07-04 PROCEDURE — G0151 HHCP-SERV OF PT,EA 15 MIN: HCPCS

## 2018-07-05 ENCOUNTER — PATIENT OUTREACH (OUTPATIENT)
Dept: OTHER | Age: 68
End: 2018-07-05

## 2018-07-06 ENCOUNTER — HOME CARE VISIT (OUTPATIENT)
Dept: SCHEDULING | Facility: HOME HEALTH | Age: 68
End: 2018-07-06
Payer: COMMERCIAL

## 2018-07-06 ENCOUNTER — HOME CARE VISIT (OUTPATIENT)
Dept: HOME HEALTH SERVICES | Facility: HOME HEALTH | Age: 68
End: 2018-07-06
Payer: COMMERCIAL

## 2018-07-06 VITALS
RESPIRATION RATE: 16 BRPM | OXYGEN SATURATION: 98 % | SYSTOLIC BLOOD PRESSURE: 140 MMHG | HEART RATE: 76 BPM | DIASTOLIC BLOOD PRESSURE: 80 MMHG

## 2018-07-06 PROCEDURE — G0152 HHCP-SERV OF OT,EA 15 MIN: HCPCS

## 2018-07-06 PROCEDURE — G0157 HHC PT ASSISTANT EA 15: HCPCS

## 2018-07-09 ENCOUNTER — PATIENT OUTREACH (OUTPATIENT)
Dept: OTHER | Age: 68
End: 2018-07-09

## 2018-07-09 ENCOUNTER — HOME CARE VISIT (OUTPATIENT)
Dept: HOME HEALTH SERVICES | Facility: HOME HEALTH | Age: 68
End: 2018-07-09
Payer: COMMERCIAL

## 2018-07-09 ENCOUNTER — HOME CARE VISIT (OUTPATIENT)
Dept: SCHEDULING | Facility: HOME HEALTH | Age: 68
End: 2018-07-09
Payer: COMMERCIAL

## 2018-07-09 VITALS
HEART RATE: 83 BPM | OXYGEN SATURATION: 98 % | RESPIRATION RATE: 16 BRPM | DIASTOLIC BLOOD PRESSURE: 80 MMHG | SYSTOLIC BLOOD PRESSURE: 140 MMHG

## 2018-07-09 VITALS
RESPIRATION RATE: 18 BRPM | TEMPERATURE: 98.7 F | DIASTOLIC BLOOD PRESSURE: 63 MMHG | OXYGEN SATURATION: 98 % | SYSTOLIC BLOOD PRESSURE: 130 MMHG | HEART RATE: 78 BPM

## 2018-07-09 PROCEDURE — G0152 HHCP-SERV OF OT,EA 15 MIN: HCPCS

## 2018-07-09 PROCEDURE — G0157 HHC PT ASSISTANT EA 15: HCPCS

## 2018-07-09 NOTE — PROGRESS NOTES
Transition Of Care Note    Patient discharged from [facility] inpatient rehab on  for bilateral knee replacment. Medical History:     Past Medical History:   Diagnosis Date    Arthritis     L knee OA both knees per pt    History of shingles 2017    left eye- treated by opthamology with steroid drops    Hypercholesterolemia     Hypertension     Ill-defined condition     obese  BMI= 30.7 on 2017    Liver disease     H/O HEPATITIS A  contracted while working in [employer] Other ill-defined conditions(799.89) [address] [facility] contacted the patient by telephone to perform post hospital and inpatient rehab discharge assessment. Verified  and zip code with patient as identifiers. Provided introduction to self, and explanation of the Nurse Care Manager role. Medication:   New Medications at Discharge: percocet, and tramadol for pain  Changed Medications at Discharge: none noted  Discontinued Medications at Discharge: none noted  Current Outpatient Prescriptions   Medication Sig    traMADol (ULTRAM) 50 mg tablet Take 1 Tab by mouth every six (6) hours as needed for Pain (Take for breakthrough pain if Oxycodone is not working). Max Daily Amount: 200 mg. Indications: Pain, Post-op Pain, Diagnosis Hip and Knee Arthritis ICD 10 - M16.9    simvastatin (ZOCOR) 40 mg tablet TAKE 1 TAB BY MOUTH NIGHTLY FOR 30 DAYS.  hydroCHLOROthiazide (HYDRODIURIL) 25 mg tablet TAKE 1 TAB BY MOUTH DAILY FOR 30 DAYS.  aspirin delayed-release 325 mg tablet Take 1 Tab by mouth two (2) times a day.  ibuprofen (MOTRIN) 800 mg tablet Take 1 Tab by mouth every six (6) hours as needed for Pain. (Patient not taking: Reported on 2018)    oxyCODONE IR (ROXICODONE) 5 mg immediate release tablet Take 1-2 Tabs by mouth every four (4) hours as needed for Pain. Max Daily Amount: 60 mg.  Indications: Pain    acetaminophen (TYLENOL EXTRA STRENGTH) 500 mg tablet Take 1-2 Tabs by mouth every six (6) hours as needed for Pain. Not to exceed 4,000mg in any 24 hour period  Indications: Pain (Patient not taking: Reported on 7/4/2018)    ondansetron (ZOFRAN ODT) 8 mg disintegrating tablet Take 0.5 Tabs by mouth every eight (8) hours as needed for Nausea. (Patient not taking: Reported on 7/4/2018)    melatonin tab tablet Take 5 mg by mouth nightly as needed. Indications: 2 tabltes    glucosam-chond-msm-boron-hyal 642-28.7-098-0 mg tab Take 1 Tab by mouth daily.  multivitamin (ONE A DAY) tablet Take 1 Tab by mouth daily.  OTHER Juice plus for vegetable and fruit supplementation      No current facility-administered medications for this visit. There are no discontinued medications. Performed medication reconciliation with patient, and patient verbalizes understanding of administration of home medications. There were no barriers to obtaining medications identified at this time. Inpatient RRAT score: 12  Was this a readmission? no   Patient stated reason for the readmission: n/a    Barriers/Support system:  patient and friend      Red Flags:  1. A sudden increase in swelling and/or redness or warmth at the area your surgery was performed which isnt relieved by rest, ice, and elevation. 2. Oral temperature greater than 101 degrees for 12 hours or more which isnt relieved by an increase in fluid intake and taking 2 Tylenol every 4-6 hours. 3. Excessive drainage from your incisions, or drainage which hasnt stopped by 72 hours after your surgery. Fever, chills, shortness of breath, chest pain, nausea, vomiting or other signs and symptoms which are of concern to you. Discharge Instructions :  Reviewed discharge instructions with patient. Patient verbalizes understanding of discharge instructions and follow-up care. Pt able to ambulate with walker. Has PT/OT at home, performing exercises as instructed at home.  Pain well controlled with tramadol twice daily and percocet for breakthrough pain. Discussed elimination, and increased risk for constipation due to opioid medication. Pt states she eats prunes and uses miralax. Has Senna as needed for constipation as well. Advance Care Planning:   Patient was offered the opportunity to discuss advance care planning:  no     Does patient have an Advance Directive:  no   If no, did you provide information on [facility]ctions?  no     PCP/Specialist follow up: Patient scheduled to follow up with Jane Richards MD on 7/16, may need to reschedule based on [facility] PT, this CM will follow up with patient on Wednesday for possible reschedule. Future Appointments  Date Time Provider Department Center   7/11/2018 To Be Determined [facility]   7/12/2018 To Be Determined Eric Tanner [facility]   7/13/2018 To Be Determined [facility]   7/16/2018 9:45 AM Jane Richards MD [facility]   7/16/2018 To Be Determined Niko Trinidad PTA [facility]   7/17/2018 To Be Determined Eric Tanner OT [facility]   7/18/2018 To Be Determined [facility]   7/19/2018 To Be Determined Eric Tanner OT [facility]   7/20/2018 To Be Determined Tammy Matson PT [facility]      Reviewed red flags with patient, and patient verbalizes understanding. Patient given an opportunity to ask questions. No other clinical/social/functional needs noted. The patient agrees to contact the PCP office for questions related to their healthcare. The patient expressed thanks, offered no additional questions and ended the call.

## 2018-07-11 ENCOUNTER — PATIENT OUTREACH (OUTPATIENT)
Dept: OTHER | Age: 68
End: 2018-07-11

## 2018-07-11 ENCOUNTER — HOME CARE VISIT (OUTPATIENT)
Dept: HOME HEALTH SERVICES | Facility: HOME HEALTH | Age: 68
End: 2018-07-11
Payer: COMMERCIAL

## 2018-07-11 VITALS
TEMPERATURE: 98.8 F | DIASTOLIC BLOOD PRESSURE: 85 MMHG | HEART RATE: 73 BPM | OXYGEN SATURATION: 98 % | RESPIRATION RATE: 16 BRPM | SYSTOLIC BLOOD PRESSURE: 143 MMHG

## 2018-07-11 PROCEDURE — G0157 HHC PT ASSISTANT EA 15: HCPCS

## 2018-07-12 ENCOUNTER — HOME CARE VISIT (OUTPATIENT)
Dept: SCHEDULING | Facility: HOME HEALTH | Age: 68
End: 2018-07-12
Payer: COMMERCIAL

## 2018-07-12 VITALS
OXYGEN SATURATION: 98 % | SYSTOLIC BLOOD PRESSURE: 120 MMHG | HEART RATE: 82 BPM | DIASTOLIC BLOOD PRESSURE: 80 MMHG | RESPIRATION RATE: 16 BRPM

## 2018-07-12 PROCEDURE — G0152 HHCP-SERV OF OT,EA 15 MIN: HCPCS

## 2018-07-13 ENCOUNTER — HOME CARE VISIT (OUTPATIENT)
Dept: HOME HEALTH SERVICES | Facility: HOME HEALTH | Age: 68
End: 2018-07-13
Payer: COMMERCIAL

## 2018-07-13 ENCOUNTER — PATIENT OUTREACH (OUTPATIENT)
Dept: OTHER | Age: 68
End: 2018-07-13

## 2018-07-13 VITALS
RESPIRATION RATE: 16 BRPM | OXYGEN SATURATION: 97 % | DIASTOLIC BLOOD PRESSURE: 68 MMHG | SYSTOLIC BLOOD PRESSURE: 141 MMHG | TEMPERATURE: 98.6 F | HEART RATE: 72 BPM

## 2018-07-13 PROCEDURE — G0157 HHC PT ASSISTANT EA 15: HCPCS

## 2018-07-15 VITALS
HEART RATE: 79 BPM | DIASTOLIC BLOOD PRESSURE: 62 MMHG | TEMPERATURE: 98.7 F | OXYGEN SATURATION: 97 % | RESPIRATION RATE: 16 BRPM | SYSTOLIC BLOOD PRESSURE: 147 MMHG

## 2018-07-16 ENCOUNTER — HOME CARE VISIT (OUTPATIENT)
Dept: HOME HEALTH SERVICES | Facility: HOME HEALTH | Age: 68
End: 2018-07-16
Payer: COMMERCIAL

## 2018-07-16 VITALS
RESPIRATION RATE: 16 BRPM | TEMPERATURE: 98.7 F | OXYGEN SATURATION: 98 % | DIASTOLIC BLOOD PRESSURE: 93 MMHG | HEART RATE: 79 BPM | SYSTOLIC BLOOD PRESSURE: 148 MMHG

## 2018-07-16 PROCEDURE — G0157 HHC PT ASSISTANT EA 15: HCPCS

## 2018-07-17 ENCOUNTER — HOME CARE VISIT (OUTPATIENT)
Dept: SCHEDULING | Facility: HOME HEALTH | Age: 68
End: 2018-07-17
Payer: COMMERCIAL

## 2018-07-17 VITALS
SYSTOLIC BLOOD PRESSURE: 142 MMHG | DIASTOLIC BLOOD PRESSURE: 80 MMHG | RESPIRATION RATE: 16 BRPM | HEART RATE: 78 BPM | OXYGEN SATURATION: 99 %

## 2018-07-17 PROCEDURE — G0152 HHCP-SERV OF OT,EA 15 MIN: HCPCS

## 2018-07-18 ENCOUNTER — HOME CARE VISIT (OUTPATIENT)
Dept: HOME HEALTH SERVICES | Facility: HOME HEALTH | Age: 68
End: 2018-07-18
Payer: COMMERCIAL

## 2018-07-18 PROCEDURE — G0157 HHC PT ASSISTANT EA 15: HCPCS

## 2018-07-19 ENCOUNTER — PATIENT OUTREACH (OUTPATIENT)
Dept: OTHER | Age: 68
End: 2018-07-19

## 2018-07-19 ENCOUNTER — HOME CARE VISIT (OUTPATIENT)
Dept: SCHEDULING | Facility: HOME HEALTH | Age: 68
End: 2018-07-19
Payer: COMMERCIAL

## 2018-07-19 VITALS
DIASTOLIC BLOOD PRESSURE: 100 MMHG | SYSTOLIC BLOOD PRESSURE: 150 MMHG | RESPIRATION RATE: 16 BRPM | OXYGEN SATURATION: 98 % | HEART RATE: 66 BPM

## 2018-07-19 PROCEDURE — G0152 HHCP-SERV OF OT,EA 15 MIN: HCPCS

## 2018-07-20 ENCOUNTER — HOME CARE VISIT (OUTPATIENT)
Dept: SCHEDULING | Facility: HOME HEALTH | Age: 68
End: 2018-07-20
Payer: COMMERCIAL

## 2018-07-20 ENCOUNTER — PATIENT OUTREACH (OUTPATIENT)
Dept: OTHER | Age: 68
End: 2018-07-20

## 2018-07-20 VITALS
HEART RATE: 74 BPM | TEMPERATURE: 98.8 F | RESPIRATION RATE: 16 BRPM | SYSTOLIC BLOOD PRESSURE: 150 MMHG | OXYGEN SATURATION: 97 % | DIASTOLIC BLOOD PRESSURE: 63 MMHG

## 2018-07-20 VITALS
TEMPERATURE: 97.8 F | DIASTOLIC BLOOD PRESSURE: 78 MMHG | SYSTOLIC BLOOD PRESSURE: 120 MMHG | OXYGEN SATURATION: 98 % | RESPIRATION RATE: 16 BRPM | HEART RATE: 78 BPM

## 2018-07-20 PROCEDURE — G0151 HHCP-SERV OF PT,EA 15 MIN: HCPCS

## 2018-07-20 NOTE — PROGRESS NOTES
Received message from patient. States that she is being discharged from [facility] PT/OT, will start Outpt PT/OT next week. Question also regarding whether she can take sulindac- NSAID rather than percocet. Doing well with ROM and flexibility, but pain occurring throughout night, affecting sleep. Noted that ibuprofen 800mg every 6 hours on medication list. This CM attempted to contact, left VM.

## 2018-07-20 NOTE — PROGRESS NOTES
Received call from patient, two pt identifiers verified. Patient recovering well, will begin outpatient PT next week. Using cane. States incisions healed well with dissolvable sutures. Has driven some, but plans to drive over the weekend. Dr. Sandro Angelo and PA contacted patient regarding pain medication, percocet for pain refilled. Continues to take percocet but not while driving, but at night for sleep. Has some residual pain as well as nerve pain. Received a denial from [facility] regarding inpatient rehab from [facility], regarding need for clinical necessity. This CM informed patient that likely [facility] will resubmit paperwork if not done already, but to contact [facility] and/or [facility] if bill received with denial of coverage. This CM will follow up in two weeks.  Follow up with Dr. Sandro Angelo scheduled on 8/14

## 2018-07-24 ENCOUNTER — HOSPITAL ENCOUNTER (OUTPATIENT)
Dept: PHYSICAL THERAPY | Age: 68
Discharge: HOME | End: 2018-07-24
Payer: COMMERCIAL

## 2018-07-24 PROCEDURE — G8979 MOBILITY GOAL STATUS: HCPCS

## 2018-07-24 PROCEDURE — 97016 VASOPNEUMATIC DEVICE THERAPY: CPT

## 2018-07-24 PROCEDURE — 97110 THERAPEUTIC EXERCISES: CPT

## 2018-07-24 PROCEDURE — G8978 MOBILITY CURRENT STATUS: HCPCS

## 2018-07-24 PROCEDURE — 97161 PT EVAL LOW COMPLEX 20 MIN: CPT

## 2018-07-24 NOTE — PROGRESS NOTES
[employer] [facility]  [address]  [place], [address] [facility]  Phone: [phone]  Fax: [phone]    Plan of Care/Statement of Necessity for Physical Therapy Services  2-15    Patient name: Chen Guerrero  : 1950  Provider#: [id number]  Referral source: Zandra Aldana MD      Medical/Treatment Diagnosis: Bilateral knee pain [M25.561, M25.562]     Prior Hospitalization: see medical history     Comorbidities: other ill-defined conditions, hypercholesteremia, hepatitis, medial meniscus tear, OA of bilateral knees, osteopenia, idiopathic hypercalciuria, OA of left hip  Prior Level of Function: pt works full-time for [employer], in administration; pt completes 20 minutes of exercise at least 3x/week  Medications: Verified on Patient Summary List    Start of Care: 2018     Onset Date: 2018       The Plan of Care and following information is based on the information from the initial evaluation. Assessment/ key information: Pt is a 77year old female who is referred to Physical Therapy by Dr. Sendy Baxter, s/p bilateral total knee replacement, 2018. Pt uses SPC for ambulation. Right Knee AROM: -//116; Left Knee AROM: -/10/120. Right knee is more painful than left; pt has been managing pain prescription medication and cryotherapy. Pt's primary complaint is difficulty sleeping, due to pain. Patient will benefit from skilled PT services to modify and progress therapeutic interventions, address functional mobility deficits, address ROM deficits, address strength deficits, analyze and address soft tissue restrictions, analyze and cue movement patterns, analyze and modify body mechanics/ergonomics, assess and modify postural abnormalities and address imbalance to attain pt/PT goals.     Evaluation Complexity History HIGH Complexity :3+ comorbidities / personal factors will impact the outcome/ POC ; Examination HIGH Complexity : 4+ Standardized tests and measures addressing body structure, function, activity limitation and / or participation in recreation  ;Presentation LOW Complexity : Stable, uncomplicated  ;Clinical Decision Making MEDIUM Complexity : FOTO score of 26-74  Overall Complexity Rating: LOW     Problem List: pain affecting function, decrease ROM, decrease strength, edema affecting function, impaired gait/ balance, decrease ADL/ functional abilitiies, decrease activity tolerance, decrease flexibility/ joint mobility and decrease transfer abilities   Treatment Plan may include any combination of the following: Therapeutic exercise, Therapeutic activities, Neuromuscular re-education, Physical agent/modality, Gait/balance training, Manual therapy and Patient education  Patient / Family readiness to learn indicated by: asking questions, trying to perform skills and interest  Persons(s) to be included in education: patient (P)  Barriers to Learning/Limitations: None  Patient Goal (s): Regain full range of motion, retake-up my normal work and recreation activities.   Patient Self Reported Health Status: good  Rehabilitation Potential: good    Short Term Goals: To be accomplished in 4 weeks:  1) Pt will be Independent with HEP. 2) Pt will demonstrate knee extension >/= to 0 degrees ext to aid in ambulation. 3) Pt will be able to ambulate >/= 1 block with normalized gait pattern on level terrain without AD. 4) Pt will demonstrate knee flexion >/= to 120 to aid in performing functional transfers. Long Term Goals: To be accomplished in 8 weeks:  1) Pt will be able to navigate a flight of stairs without pain. 2) Pt will be able to ambulate on uneven terrain without pain or instability. 3) Pt will be able to perform LE dressing without pain. 4) Pt will report improvement in overall functional mobility, as measured by FOTO, with an increased score of at least 22 points, from 39 to 61. Frequency / Duration: Patient to be seen 2 times per week for 4 weeks.     Patient/ Caregiver education and instruction: self care, activity modification and exercises    [x]  Plan of care has been reviewed with PTA    G-Codes (GP)  Mobility   Current  CL= 60-79%   Goal  CJ= 20-39%  The severity rating is based on clinical judgment and the FOTO Score. Certification Period: 07/24/2018-10/24/2018    Cassia Fuentes PT, DPT   7/24/2018 2:14 PM    ________________________________________________________________________    I certify that the above Therapy Services are being furnished while the patient is under my care. I agree with the treatment plan and certify that this therapy is necessary.     [de-identified] Signature:____________________  Date:____________Time: _________

## 2018-07-24 NOTE — PROGRESS NOTES
PT INITIAL EVALUATION NOTE - [facility] 2-15    Patient Name: Starr Hollingsworth  Date:2018  : 1950  [x]  Patient  Verified  Payor: Bryson Thomas / Plan: Félix Loya / Product Type: PPO /    In time:2:20pm  Out time:3:30pm  Total Treatment Time (min): 60  Total Timed Codes (min): 20  1:1 Treatment Time ( only): --  Visit #: 1     Treatment Area: Bilateral knee pain [M25.561, M25.562]    SUBJECTIVE  Pain Level (0-10 scale): 3/10 right, 2/10 left  Any medication changes, allergies to medications, adverse drug reactions, diagnosis change, or new procedure performed?: [] No    [x] Yes (see summary sheet for update)  Subjective:    Pt reports history of bilateral knee pain for about 12 years. Pt had left knee mensicus repair in ; meniscus repair in right knee ; left THR 2016. Pt had bilateral total knee replacements on 2018. Pt spend about 5 days in the hospital, then went to [address] [place] [address] for 10 days. When pt was discharged home, her sister from [place] came to stay with her for several days. Pt had home health PT come 3x/week for about 3 weeks. Pt using a SPC for ambulation. Pt using shower seat and elevated toilet seat at home. Pt intermittently still uses rollator for ambulation. Pt participated in 10 outpatient PT sessions, 2018-2018 for bilateral knee pain prior to bilateral TKR. Pt states that right knee is more painful than left. PLOF: chronic bilateral knee pain  Mechanism of Injury: OA  Previous Treatment/Compliance: PT, ice, pain medication  PMHx/Surgical Hx: other ill-defined conditions, hypercholesteremia, hepatitis, medial meniscus tear, OA of bilateral knees, osteopenia, idiopathic hypercalciuria, OA of left hip  Work Hx: pt works full-time for [employer] in [place]  Living Situation: pt lives alone  Pt Goals: \"Regain full range of motion, retake-up my normal work and recreation activities. \"  Barriers: multiple joints  Motivation: high  Substance use: prescription pain medication  FABQ Score: moderate        OBJECTIVE/EXAMINATION  Observations: surgical incisions healing nicely  Gait and Functional Mobility: ambulates with SPC; decreased heel strike during initial contact bilaterally; decreased knee extension during stance phase bilaterally and uneven step length  Palpation: tenderness to palpation lateral knee, left and right     Right Knee ROM: AROM -/12/116 PROM -/7/118  Left Knee ROM: AROM -/10/120 PROM -/6/122    Girth Measurments:  Mid patella:  R 42cm L 44cm      Joint Mobility Assessment: bilateral patellar mobility is good medial/lateral and hypomobile inferior/superior; tibiofemoral mobility hypomobile anterior glide bilaterally    Flexibility: tightness bilateral gastrocnemius/soleus muscles    LOWER QUARTER   MUSCLE STRENGTH  KEY       R  L  0 - No Contraction  Knee ext  5/5  5/5  1 - Trace            flex  4/5  4/5  2 - Poor   Hip ext   4/5 4/5  3 - Fair          flex   4+/5  4+/5  4 - Good         abd  3/5  3/5  5 - Normal         add  4+/5  4+/5      Ankle DF  5/5  5/5                PF  5/5  5/5         Neurological: Reflexes / Sensations: intact bilateral LEs    Special Tests: Caity's Sign: negative     SLR: extensor lag bilaterally        Modality rationale: decrease inflammation and decrease pain to improve the patients ability to ambulate and perform functional activities safely   Min Type Additional Details    [] Estim: []Att   []Unatt        []TENS instruct                  []IFC  []Premod   []NMES                     []Other:  []w/US   []w/ice   []w/heat  Position:  Location:    []  Traction: [] Cervical       []Lumbar                       [] Prone          []Supine                       []Intermittent   []Continuous Lbs:  [] before manual  [] after manual  []w/heat    []  Ultrasound: []Continuous   [] Pulsed at:                           []1MHz   []3MHz Location:  W/cm2:    [] Paraffin Location:   []w/heat    []  Ice     []  Heat  []  Ice massage Position:  Location:    []  Laser  []  Other: Position:  Location:   15   [x]  Vasopneumatic Device Pressure:       [] lo [x] med [] hi   Temperature: 34 degrees  Location: bilateral knees, elevated on wedge in extension, at end of session   [x] Skin assessment post-treatment:  [x]intact []redness- no adverse reaction    []redness  adverse reaction:     20 min Therapeutic Exercise:  [x] See flow sheet :   Rationale: increase ROM, increase strength and improve coordination to improve the patients ability to ambulate and perform functional activities safely          With   [] TE   [] TA   [] neuro   [] other: Patient Education: [x] Review HEP- pt given handout with exercises for HEP    [] Progressed/Changed HEP based on:   [] positioning   [] body mechanics   [] transfers   [] heat/ice application    [] other:      Other Objective/Functional Measures: FOTO: 39/100    Pain Level (0-10 scale) post treatment: 1/10    ASSESSMENT/Changes in Function:   Pt is a 77year old female who is referred to Physical Therapy by Dr. Smiley Miller, s/p bilateral total knee replacement, 06/18/2018. Pt uses SPC for ambulation. Right Knee AROM: -/12/116; Left Knee AROM: -/10/120. Right knee is more painful than left; pt has been managing pain prescription medication and cryotherapy. Pt's primary complaint is difficulty sleeping, due to pain. Patient will benefit from skilled PT services to modify and progress therapeutic interventions, address functional mobility deficits, address ROM deficits, address strength deficits, analyze and address soft tissue restrictions, analyze and cue movement patterns, analyze and modify body mechanics/ergonomics, assess and modify postural abnormalities and address imbalance to attain pt/PT goals.      [x]  See Plan of [address] PT, DPT  7/24/2018  2:09 PM

## 2018-07-26 ENCOUNTER — HOSPITAL ENCOUNTER (OUTPATIENT)
Dept: PHYSICAL THERAPY | Age: 68
Discharge: HOME | End: 2018-07-26
Payer: COMMERCIAL

## 2018-07-26 PROCEDURE — 97140 MANUAL THERAPY 1/> REGIONS: CPT | Performed by: PHYSICAL THERAPY ASSISTANT

## 2018-07-26 PROCEDURE — 97110 THERAPEUTIC EXERCISES: CPT | Performed by: PHYSICAL THERAPY ASSISTANT

## 2018-07-26 NOTE — PROGRESS NOTES
PT DAILY TREATMENT NOTE 2-15    Patient Name: Kari Lu  Date:2018  : 1950  [x]  Patient  Verified  Payor: Jenelle Lucero / Plan: Haydee Milian / Product Type: PPO /    In time: 2:05 PM  Out time:3:25 Pm  Total Treatment Time (min): 80  Visit #: 2     Treatment Area: Knee pain [M25.569]    SUBJECTIVE  Pain Level (0-10 scale): 2/10  Any medication changes, allergies to medications, adverse drug reactions, diagnosis change, or new procedure performed?: [x] No    [] Yes (see summary sheet for update)  Subjective functional status/changes:   [] No changes reported  Patient reports taking Tramadol 2x per day and Percocet 1x/day prior to treatment session. States compliance with HEP, ice application. She feels that rubbing her knee has helped desensitize the area at night time to help her sleep. OBJECTIVE    Modality rationale: decrease edema, decrease inflammation and decrease pain to improve the patients ability to tolerate prolonged walking, navigate stairs, perform ADL's without pain/limitation.    Min Type Additional Details    [] Estim: []Att   []Unatt        []TENS instruct                  []IFC  []Premod   []NMES                     []Other:  []w/US   []w/ice   []w/heat  Position:  Location:    []  Traction: [] Cervical       []Lumbar                       [] Prone          []Supine                       []Intermittent   []Continuous Lbs:  [] before manual  [] after manual  []w/heat    []  Ultrasound: []Continuous   [] Pulsed at:                            []1MHz   []3MHz Location:  W/cm2:    []  Paraffin         Location:  []w/heat    []  Ice     []  Heat  []  Ice massage Position:  Location:    []  Laser  []  Other: Position:  Location:    []  Vasopneumatic Device Pressure:       [] lo [] med [] hi   Temperature:    [x] Skin assessment post-treatment:  [x]intact []redness- no adverse reaction    []redness  adverse reaction:     45 min Therapeutic Exercise:  [x] See flow sheet : reviewed HEP, added standing marches, standing HS curls, mini squats, recumbent elliptical   Rationale: increase ROM, increase strength and improve coordination to improve the patients ability to tolerate prolonged walking, navigate stairs, perform ADL's without pain/limitation. 25 min Manual Therapy:  Heel prop on one knee while performing patella mobs on opposite knee x 3 minutes each, supine knee extension PROM with heel prop, supine Knee flexion PROM   Rationale: decrease pain, increase ROM and increase tissue extensibility  to improve the patients ability to  tolerate prolonged walking, navigate stairs, perform ADL's without pain/limitation. - min Gait Training:  ___ feet with ___ device on level surfaces with ___ level of assist   Rationale: increase ROM, improve coordination and gait mechanics  to improve the patients ability to  tolerate prolonged walking, navigate stairs, perform ADL's without pain/limitation. With   [] TE   [] TA   [] neuro   [] other: Patient Education: [x] Review HEP    [] Progressed/Changed HEP based on:   [] positioning   [] body mechanics   [] transfers   [] heat/ice application    [] other:      Other Objective/Functional Measures: nt     Pain Level (0-10 scale) post treatment: 3/10    ASSESSMENT/Changes in Function:   Patient fatigued with addition of new standing exercises, gave updated HEP regarding this. Improved P/AROM today. Discussion of continued use of SPC when out in the community but if she feels stable at home to try ambulating without SPC as long as she is not \"surfing\" for furniture. Patient will continue to benefit from skilled PT services to modify and progress therapeutic interventions, address functional mobility deficits, address ROM deficits, address strength deficits, analyze and cue movement patterns and instruct in home and community integration to attain remaining goals.      []  See Plan of Care  []  See progress note/recertification  []  See Discharge Summary         Progress towards goals / Updated goals:  nt    PLAN  []  Upgrade activities as tolerated     [x]  Continue plan of care  []  Update interventions per flow sheet       []  Discharge due to:_  []  Other:_      Inessa Mcdermott PTA 7/26/2018  2:05 PM

## 2018-07-27 ENCOUNTER — PATIENT OUTREACH (OUTPATIENT)
Dept: OTHER | Age: 68
End: 2018-07-27

## 2018-08-02 ENCOUNTER — HOSPITAL ENCOUNTER (OUTPATIENT)
Dept: PHYSICAL THERAPY | Age: 68
Discharge: HOME | End: 2018-08-02
Payer: COMMERCIAL

## 2018-08-02 PROCEDURE — 97110 THERAPEUTIC EXERCISES: CPT | Performed by: PHYSICAL THERAPIST

## 2018-08-02 PROCEDURE — 97016 VASOPNEUMATIC DEVICE THERAPY: CPT | Performed by: PHYSICAL THERAPIST

## 2018-08-02 PROCEDURE — 97140 MANUAL THERAPY 1/> REGIONS: CPT | Performed by: PHYSICAL THERAPIST

## 2018-08-02 NOTE — PROGRESS NOTES
PT DAILY TREATMENT NOTE 2-15 Patient Name: Maximo Sharma Date:2018 : 1950 [x]  Patient  Verified Payor: Avery Olszewski / Plan: Brandon Johnson / Product Type: PPO / In time: 1035 AM  Out time: 1210 Total Treatment Time (min): 95 Timed 80 Visit #: 4 Treatment Area: Knee pain [M25.569] SUBJECTIVE Pain Level (0-10 scale): Pt reports 2/10 R knee, 1/10 L knee, was sore for about 24 hr last visit. She reports she has had some redness over right knee incision, sore over those spots too. She took a picture of it, was thinking of sending to  Because she has his cell number. She reports she has put antibiotic cream on it and it is looking better today. Any medication changes, allergies to medications, adverse drug reactions, diagnosis change, or new procedure performed?: [x] No    [] Yes (see summary sheet for update) Subjective functional status/changes:   [] No changes reported OBJECTIVE Modality rationale: decrease edema, decrease inflammation and decrease pain to improve the patients ability to tolerate prolonged walking, navigate stairs, perform ADL's without pain/limitation. Min Type Additional Details  
 [] Estim: []Att   []Unatt        []TENS instruct []IFC  []Premod   []NMES []Other:  []w/US   []w/ice   []w/heat Position: Location:  
 []  Traction: [] Cervical       []Lumbar 
                     [] Prone          []Supine []Intermittent   []Continuous Lbs: 
[] before manual 
[] after manual 
[]w/heat  
 []  Ultrasound: []Continuous   [] Pulsed at:  
                         []1MHz   []3MHz Location: 
W/cm2:  
 []  Paraffin Location: 
[]w/heat  
 []  Ice     []  Heat 
[]  Ice massage Position: Location:  
 []  Laser 
[]  Other: Position: Location:  
15 [x]  Vasopneumatic Device bilateral knee  Pressure:       [] lo [] med [] hi  
Temperature:   
[x] Skin assessment post-treatment:  [x]intact []redness- no adverse reaction 
  []redness  adverse reaction:  
 
45 min Therapeutic Exercise:  [x] See flow sheet :   
Recommended she send picture of incision to her Dr.   
Rationale: increase ROM, increase strength and improve coordination to improve the patients ability to tolerate prolonged walking, navigate stairs, perform ADL's without pain/limitation. 30 min Manual Therapy:  Heel prop on one knee while performing patella mobs on opposite knee x 5 minutes each, supine knee extension PROM with heel prop, supine Knee flexion PROM Rationale: decrease pain, increase ROM and increase tissue extensibility  to improve the patients ability to  tolerate prolonged walking, navigate stairs, perform ADL's without pain/limitation. - min Gait Training:  ___ feet with ___ device on level surfaces with ___ level of assist  
Rationale: increase ROM, improve coordination and gait mechanics  to improve the patients ability to  tolerate prolonged walking, navigate stairs, perform ADL's without pain/limitation. With 
 [] TE 
 [] TA 
 [] neuro 
 [] other: Patient Education: [x] Review HEP [] Progressed/Changed HEP based on:  
[] positioning   [] body mechanics   [] transfers   [] heat/ice application   
[] other:   
 
Other Objective/Functional Measures: redness noted mid way up incision - mild but more than the left. Pain Level (0-10 scale) post treatment: R 2/10, left 0/10 ASSESSMENT/Changes in Function:  
Pt did well today, notes she has been compliant with heel prop and extension is observed to be improved today. She has some concern re: redness / soreness certain points on incision, since she has surgeon's number we did suggest sending picture to her surgeon.     
Patient will continue to benefit from skilled PT services to modify and progress therapeutic interventions, address functional mobility deficits, address ROM deficits, address strength deficits, analyze and cue movement patterns and instruct in home and community integration to attain remaining goals. []  See Plan of Care 
[]  See progress note/recertification 
[]  See Discharge Summary Progress towards goals / Updated goals: 
nt 
 
PLAN 
[]  Upgrade activities as tolerated     [x]  Continue plan of care 
[]  Update interventions per flow sheet      
[]  Discharge due to:_ 
[]  Other:_   
 
Delmi Pan, PT 8/2/2018  2:05 PM

## 2018-08-07 ENCOUNTER — HOSPITAL ENCOUNTER (OUTPATIENT)
Dept: PHYSICAL THERAPY | Age: 68
Discharge: HOME | End: 2018-08-07
Payer: COMMERCIAL

## 2018-08-07 PROCEDURE — 97140 MANUAL THERAPY 1/> REGIONS: CPT | Performed by: PHYSICAL THERAPIST

## 2018-08-07 PROCEDURE — 97110 THERAPEUTIC EXERCISES: CPT | Performed by: PHYSICAL THERAPIST

## 2018-08-07 PROCEDURE — 97016 VASOPNEUMATIC DEVICE THERAPY: CPT | Performed by: PHYSICAL THERAPIST

## 2018-08-07 NOTE — PROGRESS NOTES
PT DAILY TREATMENT NOTE 2-15    Patient Name: Corby Caballero  Date:2018  : 1950  [x]  Patient  Verified  Payor: Essie Laureano / Plan: Urbano Streeter / Product Type: PPO /    In time: 500 PM  Out time: 630   Total Treatment Time (min): 90  Timed 75   Visit #: 5    Treatment Area: Knee pain [M25.569]    SUBJECTIVE  Pain Level (0-10 scale): Pt reports 2/10 right knee pain, 1/10 left knee pain. Pt reports she really overdid it last weekend, took her dog for a walk then went to [place] to look for a new fridge after that. She could barely walk at all after that trip and knew she overdid it. Took it easy and it finally calmed down a few days later. Any medication changes, allergies to medications, adverse drug reactions, diagnosis change, or new procedure performed?: [x] No    [] Yes (see summary sheet for update)  Subjective functional status/changes:   [] No changes reported      OBJECTIVE    AROM:    R knee : + 3 to 122 deg  L knee: + 3 to 121 deg    Modality rationale: decrease edema, decrease inflammation and decrease pain to improve the patients ability to tolerate prolonged walking, navigate stairs, perform ADL's without pain/limitation.    Min Type Additional Details    [] Estim: []Att   []Unatt        []TENS instruct                  []IFC  []Premod   []NMES                     []Other:  []w/US   []w/ice   []w/heat  Position:  Location:    []  Traction: [] Cervical       []Lumbar                       [] Prone          []Supine                       []Intermittent   []Continuous Lbs:  [] before manual  [] after manual  []w/heat    []  Ultrasound: []Continuous   [] Pulsed at:                            []1MHz   []3MHz Location:  W/cm2:    []  Paraffin         Location:  []w/heat    []  Ice     []  Heat  []  Ice massage Position:  Location:    []  Laser  []  Other: Position:  Location:   15 [x]  Vasopneumatic Device bilateral knee  Pressure:       [] lo [] med [] hi Temperature:    [x] Skin assessment post-treatment:  [x]intact []redness- no adverse reaction    []redness  adverse reaction:     45 min Therapeutic Exercise:  [x] See flow sheet :        Rationale: increase ROM, increase strength and improve coordination to improve the patients ability to tolerate prolonged walking, navigate stairs, perform ADL's without pain/limitation. 30 min Manual Therapy:  Heel prop on one knee while performing patella mobs on opposite knee x 5 minutes each, supine knee extension PROM with heel prop, supine Knee flexion PROM   Rationale: decrease pain, increase ROM and increase tissue extensibility  to improve the patients ability to  tolerate prolonged walking, navigate stairs, perform ADL's without pain/limitation. - min Gait Training:  ___ feet with ___ device on level surfaces with ___ level of assist   Rationale: increase ROM, improve coordination and gait mechanics  to improve the patients ability to  tolerate prolonged walking, navigate stairs, perform ADL's without pain/limitation. With   [] TE   [] TA   [] neuro   [] other: Patient Education: [x] Review HEP    [] Progressed/Changed HEP based on:   [] positioning   [] body mechanics   [] transfers   [] heat/ice application    [] other:      Other Objective/Functional Measures: incisions look normal, no mild redness as was observed at last visit. Pain Level (0-10 scale) post treatment: R 1/10, left 1/10       ASSESSMENT/Changes in Function:   Pt ROM extension is much improved from initial visit as noted above. Patient will continue to benefit from skilled PT services to modify and progress therapeutic interventions, address functional mobility deficits, address ROM deficits, address strength deficits, analyze and cue movement patterns and instruct in home and community integration to attain remaining goals.      []  See Plan of Care  []  See progress note/recertification  []  See Discharge Summary Progress towards goals / Updated goals:  nt    PLAN  [x]  Upgrade activities as tolerated     [x]  Continue plan of care  []  Update interventions per flow sheet       []  Discharge due to:_  [x]  Other:_pt returns to MD 08/14      Arnulfo Tavarez PT 8/7/2018  2:05 PM

## 2018-08-09 ENCOUNTER — HOSPITAL ENCOUNTER (OUTPATIENT)
Dept: PHYSICAL THERAPY | Age: 68
Discharge: HOME | End: 2018-08-09
Payer: COMMERCIAL

## 2018-08-09 PROCEDURE — 97016 VASOPNEUMATIC DEVICE THERAPY: CPT | Performed by: PHYSICAL THERAPIST

## 2018-08-09 PROCEDURE — 97110 THERAPEUTIC EXERCISES: CPT | Performed by: PHYSICAL THERAPIST

## 2018-08-09 PROCEDURE — 97140 MANUAL THERAPY 1/> REGIONS: CPT | Performed by: PHYSICAL THERAPIST

## 2018-08-09 NOTE — PROGRESS NOTES
PT DAILY TREATMENT NOTE 2-15    Patient Name: Judeth Cowden  Date:2018  : 1950  [x]  Patient  Verified  Payor: Estiven Slater / Plan: Melita Trivediivers / Product Type: PPO /    In time: 445 PM  Out time: 615    Total Treatment Time (min): 90  Timed 75  Visit #: 6    Treatment Area: Knee pain [M25.569]    SUBJECTIVE  Pain Level (0-10 scale): Pt reports 1/10 knee pain bilaterally, also took percoset earlier today but overall seems that pain is lower today. Any medication changes, allergies to medications, adverse drug reactions, diagnosis change, or new procedure performed?: [x] No    [] Yes (see summary sheet for update)  Subjective functional status/changes:   [] No changes reported      OBJECTIVE      Modality rationale: decrease edema, decrease inflammation and decrease pain to improve the patients ability to tolerate prolonged walking, navigate stairs, perform ADL's without pain/limitation. Min Type Additional Details    [] Estim: []Att   []Unatt        []TENS instruct                  []IFC  []Premod   []NMES                     []Other:  []w/US   []w/ice   []w/heat  Position:  Location:    []  Traction: [] Cervical       []Lumbar                       [] Prone          []Supine                       []Intermittent   []Continuous Lbs:  [] before manual  [] after manual  []w/heat    []  Ultrasound: []Continuous   [] Pulsed at:                            []1MHz   []3MHz Location:  W/cm2:    []  Paraffin         Location:  []w/heat    []  Ice     []  Heat  []  Ice massage Position:  Location:    []  Laser  []  Other: Position:  Location:   15 [x]  Vasopneumatic Device bilateral knee  Pressure:       [x] lo [] med [] hi   Temperature: 42 deg.     [x] Skin assessment post-treatment:  [x]intact []redness- no adverse reaction    []redness  adverse reaction:     45 min Therapeutic Exercise:  [x] See flow sheet :  Added lateral stepping, added 2 lb to ham curls  Taught scar incision mobilization. Brief gait training - cues for increasing reciprocal arm swing . Rationale: increase ROM, increase strength and improve coordination to improve the patients ability to tolerate prolonged walking, navigate stairs, perform ADL's without pain/limitation. 30 min Manual Therapy:  Heel prop on one knee while performing patella mobs on opposite knee x 5 minutes each ADDED 2 lb , supine knee extension PROM with heel prop, supine Knee flexion PROM   Rationale: decrease pain, increase ROM and increase tissue extensibility  to improve the patients ability to  tolerate prolonged walking, navigate stairs, perform ADL's without pain/limitation. - min Gait Training:  ___ feet with ___ device on level surfaces with ___ level of assist   Rationale: increase ROM, improve coordination and gait mechanics  to improve the patients ability to  tolerate prolonged walking, navigate stairs, perform ADL's without pain/limitation. With   [] TE   [] TA   [] neuro   [] other: Patient Education: [x] Review HEP    [] Progressed/Changed HEP based on:   [] positioning   [] body mechanics   [] transfers   [] heat/ice application    [] other:      Other Objective/Functional Measures: see above. Pain Level (0-10 scale) post treatment: 1     ASSESSMENT/Changes in Function:   Pt progressing well in therapeutic exercises today, was able to tolerate 2 lb weight for LLPS. Patient will continue to benefit from skilled PT services to modify and progress therapeutic interventions, address functional mobility deficits, address ROM deficits, address strength deficits, analyze and cue movement patterns and instruct in home and community integration to attain remaining goals. []  See Plan of Care  []  See progress note/recertification  []  See Discharge Summary         Short Term Goals: To be accomplished in 4 weeks:  1) Pt will be Independent with HEP.  MET  2) Pt will demonstrate knee extension >/= to 0 degrees ext to aid in ambulation. 3) Pt will be able to ambulate >/= 1 block with normalized gait pattern on level terrain without AD. 4) Pt will demonstrate knee flexion >/= to 120 to aid in performing functional transfers. MET      Long Term Goals: To be accomplished in 8 weeks:  1) Pt will be able to navigate a flight of stairs without pain. 2) Pt will be able to ambulate on uneven terrain without pain or instability. 3) Pt will be able to perform LE dressing without pain.   4) Pt will report improvement in overall functional mobility, as measured by FOTO, with an increased score of at least 22 points, from 39 to 61.      PLAN  [x]  Upgrade activities as tolerated     [x]  Continue plan of care  []  Update interventions per flow sheet       []  Discharge due to:_  [x]  Other:_pt returns to MD 08/14      Greg Pires PT 8/9/2018  2:05 PM

## 2018-08-10 ENCOUNTER — PATIENT OUTREACH (OUTPATIENT)
Dept: OTHER | Age: 68
End: 2018-08-10

## 2018-08-10 NOTE — PROGRESS NOTES
Resolving current episode Transitions of care complete. No further ED/UC or hospital admissions within 30 days post discharge. Patient attended follow-up appointments as directed. No outreach from patient to [address] [place] [address].

## 2018-08-13 ENCOUNTER — HOSPITAL ENCOUNTER (OUTPATIENT)
Dept: PHYSICAL THERAPY | Age: 68
Discharge: HOME | End: 2018-08-13
Payer: COMMERCIAL

## 2018-08-13 PROCEDURE — 97140 MANUAL THERAPY 1/> REGIONS: CPT | Performed by: PHYSICAL THERAPIST

## 2018-08-13 PROCEDURE — 97110 THERAPEUTIC EXERCISES: CPT | Performed by: PHYSICAL THERAPIST

## 2018-08-13 PROCEDURE — 97016 VASOPNEUMATIC DEVICE THERAPY: CPT | Performed by: PHYSICAL THERAPIST

## 2018-08-13 NOTE — PROGRESS NOTES
PT DAILY TREATMENT NOTE 2-15    Patient Name: Ash Jordan  Date:2018  : 1950  [x]  Patient  Verified  Payor: Arnulfo Harris / Plan: Jaxson Oh / Product Type: PPO /    In time: 1000 AM  Out time: 1130    Total Treatment Time (min): 90   Timed 75  Visit #: 7    Treatment Area: Knee pain [M25.569]    SUBJECTIVE  Pain Level (0-10 scale): Pt reports 1/10 right knee pain, 0/10 left knee pain (prior to starting PT today). Any medication changes, allergies to medications, adverse drug reactions, diagnosis change, or new procedure performed?: [x] No    [] Yes (see summary sheet for update)  Subjective functional status/changes:   [] No changes reported      OBJECTIVE    See MD note for measurements. Modality rationale: decrease edema, decrease inflammation and decrease pain to improve the patients ability to tolerate prolonged walking, navigate stairs, perform ADL's without pain/limitation. Min Type Additional Details    [] Estim: []Att   []Unatt        []TENS instruct                  []IFC  []Premod   []NMES                     []Other:  []w/US   []w/ice   []w/heat  Position:  Location:    []  Traction: [] Cervical       []Lumbar                       [] Prone          []Supine                       []Intermittent   []Continuous Lbs:  [] before manual  [] after manual  []w/heat    []  Ultrasound: []Continuous   [] Pulsed at:                            []1MHz   []3MHz Location:  W/cm2:    []  Paraffin         Location:  []w/heat    []  Ice     []  Heat  []  Ice massage Position:  Location:    []  Laser  []  Other: Position:  Location:   15 [x]  Vasopneumatic Device bilateral knee  Pressure:       [x] lo [] med [] hi   Temperature: 42 deg.     [x] Skin assessment post-treatment:  [x]intact []redness- no adverse reaction    []redness  adverse reaction:     45 min Therapeutic Exercise:  [x] See flow sheet :            Rationale: increase ROM, increase strength and improve coordination to improve the patients ability to tolerate prolonged walking, navigate stairs, perform ADL's without pain/limitation. 30 min Manual Therapy:  Heel prop on one knee while performing patella mobs on opposite knee x 5 minutes each WITH 2 lb , supine knee extension PROM with heel prop, supine Knee flexion PROM. Scar incision mobilization. Rationale: decrease pain, increase ROM and increase tissue extensibility  to improve the patients ability to  tolerate prolonged walking, navigate stairs, perform ADL's without pain/limitation. - min Gait Training:  ___ feet with ___ device on level surfaces with ___ level of assist   Rationale: increase ROM, improve coordination and gait mechanics  to improve the patients ability to  tolerate prolonged walking, navigate stairs, perform ADL's without pain/limitation. With   [] TE   [] TA   [] neuro   [] other: Patient Education: [x] Review HEP    [] Progressed/Changed HEP based on:   [] positioning   [] body mechanics   [] transfers   [] heat/ice application    [] other:      Other Objective/Functional Measures: see above. Pain Level (0-10 scale) post treatment: 1      ASSESSMENT/Changes in Function:   See MD note. Patient will continue to benefit from skilled PT services to modify and progress therapeutic interventions, address functional mobility deficits, address ROM deficits, address strength deficits, analyze and cue movement patterns and instruct in home and community integration to attain remaining goals. []  See Plan of Care  []  See progress note/recertification  []  See Discharge Summary         Short Term Goals: To be accomplished in 4 weeks:  1) Pt will be Independent with HEP. MET  2) Pt will demonstrate knee extension >/= to 0 degrees ext to aid in ambulation. 3) Pt will be able to ambulate >/= 1 block with normalized gait pattern on level terrain without AD.   4) Pt will demonstrate knee flexion >/= to 120 to aid in performing functional transfers. MET      Long Term Goals: To be accomplished in 8 weeks:  1) Pt will be able to navigate a flight of stairs without pain. 2) Pt will be able to ambulate on uneven terrain without pain or instability. 3) Pt will be able to perform LE dressing without pain.   4) Pt will report improvement in overall functional mobility, as measured by FOTO, with an increased score of at least 22 points, from 39 to 61.      PLAN  [x]  Upgrade activities as tolerated     [x]  Continue plan of care  []  Update interventions per flow sheet       []  Discharge due to:_  [x]  Other:_pt returns to MD 08/14      Harini Jesus PT 8/13/2018  2:05 PM

## 2018-08-13 NOTE — PROGRESS NOTES
PT to MD NOTE 2-15    Patient Name: Tomas Parry  Date:2018  : 1950    Visit #: 7    Treatment Area: Knee pain [M25.569]    SUBJECTIVE  Pain Level (0-10 scale): Pt reports 1/10 right knee pain, 0/10 left knee pain (prior to starting PT today). Pt is still working from home but hoping to return to work soon. Right leg still feels weaker as compared to the left as well. OBJECTIVE    ROM:  2 deg to 127 deg bilaterally. Gait: Without SPC: pt ambulates with decreased reciprocal arm swing, decreased step length. Stairs : pt reports at home she is going one step at a time although in clinic she is able to perform 6 inch step up (pt notes stairs are higher at home). ASSESSMENT/Changes in Function:   Pt with 7 skilled PT visits, pt has made excellent progress in ROM including extension and flexion. She is lacking only 2 deg of extension now and flexion is excellent. We plan to progress further to improve functional strength as well as ambulation without need for AD. Pt continues to ambulate with SPC. Short Term Goals: To be accomplished in 4 weeks:  1) Pt will be Independent with HEP. MET  2) Pt will demonstrate knee extension >/= to 0 degrees ext to aid in ambulation (progressing)  3) Pt will be able to ambulate >/= 1 block with normalized gait pattern on level terrain without AD.  (progressing)  4) Pt will demonstrate knee flexion >/= to 120 to aid in performing functional transfers. MET      Long Term Goals: To be accomplished in 8 weeks:  1) Pt will be able to navigate a flight of stairs without pain. 2) Pt will be able to ambulate on uneven terrain without pain or instability. 3) Pt will be able to perform LE dressing without pain.   4) Pt will report improvement in overall functional mobility, as measured by FOTO, with an increased score of at least 22 points, from 39 to 61.      PLAN    [x]  Other:_pt returns to MD , plan to continue through August with focus on functional strengthening as ROM is progressing nicely and then determine need for further PT.    Ken Mendez, PT 8/13/2018  2:05 PM

## 2018-08-15 ENCOUNTER — HOSPITAL ENCOUNTER (OUTPATIENT)
Dept: PHYSICAL THERAPY | Age: 68
Discharge: HOME | End: 2018-08-15
Payer: COMMERCIAL

## 2018-08-15 PROCEDURE — 97110 THERAPEUTIC EXERCISES: CPT | Performed by: PHYSICAL THERAPY ASSISTANT

## 2018-08-15 PROCEDURE — 97140 MANUAL THERAPY 1/> REGIONS: CPT | Performed by: PHYSICAL THERAPY ASSISTANT

## 2018-08-20 ENCOUNTER — HOSPITAL ENCOUNTER (OUTPATIENT)
Dept: PHYSICAL THERAPY | Age: 68
Discharge: HOME | End: 2018-08-20
Payer: COMMERCIAL

## 2018-08-20 PROCEDURE — 97140 MANUAL THERAPY 1/> REGIONS: CPT | Performed by: PHYSICAL THERAPIST

## 2018-08-20 PROCEDURE — 97016 VASOPNEUMATIC DEVICE THERAPY: CPT | Performed by: PHYSICAL THERAPIST

## 2018-08-20 PROCEDURE — 97110 THERAPEUTIC EXERCISES: CPT | Performed by: PHYSICAL THERAPIST

## 2018-08-20 NOTE — PROGRESS NOTES
PT DAILY TREATMENT NOTE 2-15    Patient Name: Jesus Manuel Dotson  Date:2018  : 1950  [x]  Patient  Verified  Payor: Angella Mireles / Plan: Luzmaria Martinez / Product Type: PPO /    In time: 2605 AM  Out time: 1205  Total Treatment Time (min): 85   Timed 70  Visit #: 9    Treatment Area: Knee pain [M25.569]    SUBJECTIVE  Pain Level (0-10 scale): Pt reports 1/10 right knee pain, 0/10 left knee pain. Pt reports her surgeon was pleased with her progress. Any medication changes, allergies to medications, adverse drug reactions, diagnosis change, or new procedure performed?: [x] No    [] Yes (see summary sheet for update)  Subjective functional status/changes:   [] No changes reported      OBJECTIVE      Modality rationale: decrease edema, decrease inflammation and decrease pain to improve the patients ability to tolerate prolonged walking, navigate stairs, perform ADL's without pain/limitation. Min Type Additional Details    [] Estim: []Att   []Unatt        []TENS instruct                  []IFC  []Premod   []NMES                     []Other:  []w/US   []w/ice   []w/heat  Position:  Location:    []  Traction: [] Cervical       []Lumbar                       [] Prone          []Supine                       []Intermittent   []Continuous Lbs:  [] before manual  [] after manual  []w/heat    []  Ultrasound: []Continuous   [] Pulsed at:                            []1MHz   []3MHz Location:  W/cm2:    []  Paraffin         Location:  []w/heat    [x]  Ice     []  Heat  []  Ice massage Position: supine with Le's elevated  Location: B knees    []  Laser  []  Other: Position:  Location:   10 [x]  Vasopneumatic Device bilateral knee  Pressure:       [x] lo [] med [] hi   Temperature: 42 deg.     [x] Skin assessment post-treatment:  [x]intact []redness- no adverse reaction    []redness  adverse reaction:     55 min Therapeutic Exercise:  [x] See flow sheet :    Added resisted hamstring curls on nautilus machine,  Added t-band to side steps,  Added leg extensions on nautilus machine. Rationale: increase ROM, increase strength and improve coordination to improve the patients ability to tolerate prolonged walking, navigate stairs, perform ADL's without pain/limitation. 15 min Manual Therapy:  Heel prop on one knee while performing patella mobs on opposite knee x 5 minutes each WITH 3 lb , supine knee extension PROM with heel prop, supine Knee flexion PROM. Rationale: decrease pain, increase ROM and increase tissue extensibility  to improve the patients ability to  tolerate prolonged walking, navigate stairs, perform ADL's without pain/limitation. - min Gait Training:  ___ feet with ___ device on level surfaces with ___ level of assist   Rationale: increase ROM, improve coordination and gait mechanics  to improve the patients ability to  tolerate prolonged walking, navigate stairs, perform ADL's without pain/limitation. With   [] TE   [] TA   [] neuro   [] other: Patient Education: [x] Review HEP    [] Progressed/Changed HEP based on:   [] positioning   [] body mechanics   [] transfers   [] heat/ice application    [] other:      Other Objective/Functional Measures: -    Pain Level (0-10 scale) post treatment: 0/10      ASSESSMENT/Changes in Function: Pt did well with progression in therapeutic exercises although was challenged. Patient will continue to benefit from skilled PT services to modify and progress therapeutic interventions, address functional mobility deficits, address ROM deficits, address strength deficits, analyze and cue movement patterns and instruct in home and community integration to attain remaining goals. []  See Plan of Care  []  See progress note/recertification  []  See Discharge Summary         Short Term Goals: To be accomplished in 4 weeks:  1) Pt will be Independent with HEP.  MET  2) Pt will demonstrate knee extension >/= to 0 degrees ext to aid in ambulation. 3) Pt will be able to ambulate >/= 1 block with normalized gait pattern on level terrain without AD. 4) Pt will demonstrate knee flexion >/= to 120 to aid in performing functional transfers. MET      Long Term Goals: To be accomplished in 8 weeks:  1) Pt will be able to navigate a flight of stairs without pain. 2) Pt will be able to ambulate on uneven terrain without pain or instability. 3) Pt will be able to perform LE dressing without pain.   4) Pt will report improvement in overall functional mobility, as measured by FOTO, with an increased score of at least 22 points, from 39 to 61.      PLAN  [x]  Upgrade activities as tolerated     [x]  Continue plan of care  []  Update interventions per flow sheet       []  Discharge due to:_  [x]  Other Continue PT through august with focus on functional strengthening as ROM is progressing nicely and then determine need for further PT.       Clyde Cowan, PT 8/20/2018  3:40 PM

## 2018-08-22 ENCOUNTER — APPOINTMENT (OUTPATIENT)
Dept: PHYSICAL THERAPY | Age: 68
End: 2018-08-22
Payer: COMMERCIAL

## 2018-08-23 ENCOUNTER — HOSPITAL ENCOUNTER (OUTPATIENT)
Dept: PHYSICAL THERAPY | Age: 68
Discharge: HOME | End: 2018-08-23
Payer: COMMERCIAL

## 2018-08-23 PROCEDURE — 97110 THERAPEUTIC EXERCISES: CPT | Performed by: PHYSICAL THERAPIST

## 2018-08-23 PROCEDURE — 97016 VASOPNEUMATIC DEVICE THERAPY: CPT | Performed by: PHYSICAL THERAPIST

## 2018-08-23 PROCEDURE — 97140 MANUAL THERAPY 1/> REGIONS: CPT | Performed by: PHYSICAL THERAPIST

## 2018-08-23 NOTE — PROGRESS NOTES
PT DAILY TREATMENT NOTE 2-15    Patient Name: Cheikh Alvarez  Date:2018  : 1950  [x]  Patient  Verified  Payor: Carolina Wang / Plan: LuisF Reyes / Product Type: PPO /    In time: 500 PM  Out time: 635  Total Treatment Time (min): 90   Timed 75  Visit #: 10    Treatment Area: Knee pain [M25.569]    SUBJECTIVE  Pain Level (0-10 scale): Pt reports 1/10 right knee pain, 0/10 left knee pain. Pt reports she had a busy day, had 2 meetings for work and a  so she was in and out of the car and sitting some but not for too long. She is having a little more pain in the inside of the right knee. Any medication changes, allergies to medications, adverse drug reactions, diagnosis change, or new procedure performed?: [x] No    [] Yes (see summary sheet for update)  Subjective functional status/changes:   [] No changes reported      OBJECTIVE      Modality rationale: decrease edema, decrease inflammation and decrease pain to improve the patients ability to tolerate prolonged walking, navigate stairs, perform ADL's without pain/limitation. Min Type Additional Details    [] Estim: []Att   []Unatt        []TENS instruct                  []IFC  []Premod   []NMES                     []Other:  []w/US   []w/ice   []w/heat  Position:  Location:    []  Traction: [] Cervical       []Lumbar                       [] Prone          []Supine                       []Intermittent   []Continuous Lbs:  [] before manual  [] after manual  []w/heat    []  Ultrasound: []Continuous   [] Pulsed at:                            []1MHz   []3MHz Location:  W/cm2:    []  Paraffin         Location:  []w/heat    [x]  Ice     []  Heat  []  Ice massage Position: supine with Le's elevated  Location: B knees    []  Laser  []  Other: Position:  Location:   10 [x]  Vasopneumatic Device bilateral knee  Pressure:       [] lo [x] med [] hi   Temperature: 42 deg.     [x] Skin assessment post-treatment:  [x]intact []redness- no adverse reaction    []redness  adverse reaction:     55 min Therapeutic Exercise:  [x] See flow sheet :        Rationale: increase ROM, increase strength and improve coordination to improve the patients ability to tolerate prolonged walking, navigate stairs, perform ADL's without pain/limitation. 20 min Manual Therapy:  Heel prop on one knee while performing STM to medial hamstring to opposite LE (used 3 lb weight with heel prop) supine knee extension PROM with heel prop, supine Knee flexion PROM. Rationale: decrease pain, increase ROM and increase tissue extensibility  to improve the patients ability to  tolerate prolonged walking, navigate stairs, perform ADL's without pain/limitation. - min Gait Training:  ___ feet with ___ device on level surfaces with ___ level of assist   Rationale: increase ROM, improve coordination and gait mechanics  to improve the patients ability to  tolerate prolonged walking, navigate stairs, perform ADL's without pain/limitation. 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     Patient will continue to benefit from skilled PT services to modify and progress therapeutic interventions, address functional mobility deficits, address ROM deficits, address strength deficits, analyze and cue movement patterns and instruct in home and community integration to attain remaining goals. []  See Plan of Care  []  See progress note/recertification  []  See Discharge Summary         Short Term Goals:  To be accomplished in 4 weeks:  1) Pt will be Independent with HEP. MET  2) Pt will demonstrate knee extension >/= to 0 degrees ext to aid in ambulation. 3) Pt will be able to ambulate >/= 1 block with normalized gait pattern on level terrain without AD. 4) Pt will demonstrate knee flexion >/= to 120 to aid in performing functional transfers. MET      Long Term Goals: To be accomplished in 8 weeks:  1) Pt will be able to navigate a flight of stairs without pain. 2) Pt will be able to ambulate on uneven terrain without pain or instability. 3) Pt will be able to perform LE dressing without pain.   4) Pt will report improvement in overall functional mobility, as measured by FOTO, with an increased score of at least 22 points, from 39 to 61.      PLAN  [x]  Upgrade activities as tolerated     [x]  Continue plan of care  []  Update interventions per flow sheet       []  Discharge due to:_  [x]  Other Continue PT through august with focus on functional strengthening as ROM is progressing nicely and then determine need for further PT.       Ken Mendez, PT 8/23/2018  3:40 PM

## 2018-08-28 ENCOUNTER — HOSPITAL ENCOUNTER (OUTPATIENT)
Dept: PHYSICAL THERAPY | Age: 68
Discharge: HOME | End: 2018-08-28
Payer: COMMERCIAL

## 2018-08-28 PROCEDURE — 97110 THERAPEUTIC EXERCISES: CPT | Performed by: PHYSICAL THERAPIST

## 2018-08-28 PROCEDURE — 97140 MANUAL THERAPY 1/> REGIONS: CPT | Performed by: PHYSICAL THERAPIST

## 2018-08-28 PROCEDURE — 97016 VASOPNEUMATIC DEVICE THERAPY: CPT | Performed by: PHYSICAL THERAPIST

## 2018-08-28 NOTE — PROGRESS NOTES
PT DAILY TREATMENT NOTE 2-15 Patient Name: Jermain Lnez Date:2018 : 1950 [x]  Patient  Verified Payor: Alana Patel / Plan: Barrie Schilder / Product Type: PPO / In time: 1130 AM  Out time: 5525 Total Treatment Time (min):  85 Timed 70 Visit #: 41 Treatment Area: Knee pain [M25.569] SUBJECTIVE Pain Level (0-10 scale): Pt reports pain is 1-2/10. Pt reports she has been off her schedule because she has had company staying with her. Pt reports stairs are getting better but they are still the most challenging for her. Any medication changes, allergies to medications, adverse drug reactions, diagnosis change, or new procedure performed?: [x] No    [] Yes (see summary sheet for update) Subjective functional status/changes:   [] No changes reported OBJECTIVE Modality rationale: decrease edema, decrease inflammation and decrease pain to improve the patients ability to tolerate prolonged walking, navigate stairs, perform ADL's without pain/limitation. Min Type Additional Details  
 [] Estim: []Att   []Unatt        []TENS instruct []IFC  []Premod   []NMES []Other:  []w/US   []w/ice   []w/heat Position: Location:  
 []  Traction: [] Cervical       []Lumbar 
                     [] Prone          []Supine []Intermittent   []Continuous Lbs: 
[] before manual 
[] after manual 
[]w/heat  
 []  Ultrasound: []Continuous   [] Pulsed at:  
                         []1MHz   []3MHz Location: 
W/cm2:  
 []  Paraffin Location: 
[]w/heat  
 [x]  Ice     []  Heat 
[]  Ice massage Position: supine with Le's elevated Location: B knees  
 []  Laser 
[]  Other: Position: Location:  
10 [x]  Vasopneumatic Device bilateral knee  Pressure:       [] lo [x] med [] hi  
Temperature: 42 deg. [x] Skin assessment post-treatment:  [x]intact []redness- no adverse reaction 
  []redness  adverse reaction: 55 min Therapeutic Exercise:  [x] See flow sheet :   
   
Rationale: increase ROM, increase strength and improve coordination to improve the patients ability to tolerate prolonged walking, navigate stairs, perform ADL's without pain/limitation. 15 min Manual Therapy:  Heel prop on one knee while performing MT on opposite LE (used 3 lb weight with heel prop). Posterior femur mobs with 1/2 foam to increase extension. PF mobs inferior and superior. STM to medial hamstring on the right>left. Rationale: decrease pain, increase ROM and increase tissue extensibility  to improve the patients ability to  tolerate prolonged walking, navigate stairs, perform ADL's without pain/limitation. - min Gait Training:  ___ feet with ___ device on level surfaces with ___ level of assist  
Rationale: increase ROM, improve coordination and gait mechanics  to improve the patients ability to  tolerate prolonged walking, navigate stairs, perform ADL's without pain/limitation. With 
 [] TE 
 [] TA 
 [] neuro 
 [] other: Patient Education: [x] Review HEP [] Progressed/Changed HEP based on:  
[] positioning   [] body mechanics   [] transfers   [] heat/ice application   
[] other:   
 
Other Objective/Functional Measures: - 
 
Pain Level (0-10 scale) post treatment: \"numb\" ASSESSMENT/Changes in Function: Pt continues with increased TTP medial hamstring bilaterally, encouraged pt to rest when she can, continue to ice as she has returned to work. Pt with some increased knee pain (right) with step ups. We did observe dynamic valgus and cued pt to correct with verbal cues and visual cues (mirror). Patient will continue to benefit from skilled PT services to modify and progress therapeutic interventions, address functional mobility deficits, address ROM deficits, address strength deficits, analyze and cue movement patterns and instruct in home and community integration to attain remaining goals. []  See Plan of Care 
[]  See progress note/recertification 
[]  See Discharge Summary Short Term Goals: To be accomplished in 4 weeks: 
1) Pt will be Independent with HEP. MET 
2) Pt will demonstrate knee extension >/= to 0 degrees ext to aid in ambulation. 3) Pt will be able to ambulate >/= 1 block with normalized gait pattern on level terrain without AD. 4) Pt will demonstrate knee flexion >/= to 120 to aid in performing functional transfers. MET  
  
Long Term Goals: To be accomplished in 8 weeks: 
1) Pt will be able to navigate a flight of stairs without pain. 2) Pt will be able to ambulate on uneven terrain without pain or instability. 3) Pt will be able to perform LE dressing without pain. 4) Pt will report improvement in overall functional mobility, as measured by FOTO, with an increased score of at least 22 points, from 39 to 61.  
  
PLAN [x]  Upgrade activities as tolerated     [x]  Continue plan of care 
[]  Update interventions per flow sheet      
[]  Discharge due to:_ 
[x]  Other Continue PT through august with focus on functional strengthening as ROM is progressing nicely and then determine need for further PT. 
  
 
Arnulfo Tavarez, PT 8/28/2018  3:40 PM

## 2018-08-29 ENCOUNTER — PATIENT OUTREACH (OUTPATIENT)
Dept: OTHER | Age: 68
End: 2018-08-29

## 2018-08-29 NOTE — PROGRESS NOTES
Received incoming call from patient, two pt identifiers verified. Patient states she received a letter from [facility] regarding her hospital visit, and that balance has not been paid to [facility], and that patient is to contact [facility] as balance is $[phone], or after 60 days, patient will be responsible. This CM sent information to CM Supervisor at [facility] to address. Will contact patient after information is received.

## 2018-08-30 ENCOUNTER — HOSPITAL ENCOUNTER (OUTPATIENT)
Dept: PHYSICAL THERAPY | Age: 68
Discharge: HOME | End: 2018-08-30
Payer: COMMERCIAL

## 2018-08-30 PROCEDURE — 97110 THERAPEUTIC EXERCISES: CPT | Performed by: PHYSICAL THERAPIST

## 2018-08-30 NOTE — PROGRESS NOTES
PT DAILY TREATMENT NOTE 2-15 Patient Name: Peace Celestin Date:2018 : 1950 [x]  Patient  Verified Payor: Jessi Lepe / Plan: Alysia Urbina / Product Type: PPO / In time: 1220 PM  Out time: 130 Total Treatment Time (min):   70 Timed 60 Visit #: 08 Treatment Area: Bilateral knee pain [M25.561, M25.562] SUBJECTIVE 
 
** Pt arrived 21 ' late. Reported she was caught up at work. ** 
 
Pain Level (0-10 scale): Pt reports pain is about 1/10 R knee > L. Pt reports she is late because she was at the hospital.    
 
Any medication changes, allergies to medications, adverse drug reactions, diagnosis change, or new procedure performed?: [x] No    [] Yes (see summary sheet for update) Subjective functional status/changes:   [] No changes reported OBJECTIVE Modality rationale: decrease edema, decrease inflammation and decrease pain to improve the patients ability to tolerate prolonged walking, navigate stairs, perform ADL's without pain/limitation. Min Type Additional Details  
 [] Estim: []Att   []Unatt        []TENS instruct []IFC  []Premod   []NMES []Other:  []w/US   []w/ice   []w/heat Position: Location:  
 []  Traction: [] Cervical       []Lumbar 
                     [] Prone          []Supine []Intermittent   []Continuous Lbs: 
[] before manual 
[] after manual 
[]w/heat  
 []  Ultrasound: []Continuous   [] Pulsed at:  
                         []1MHz   []3MHz Location: 
W/cm2:  
 []  Paraffin Location: 
[]w/heat  
10 [x]  Ice     []  Heat 
[]  Ice massage Position: supine with Le's elevated Location: B knees  
 []  Laser 
[]  Other: Position: Location:  
 [x]  Vasopneumatic Device bilateral knee  Pressure:       [] lo [x] med [] hi  
Temperature: 42 deg. [x] Skin assessment post-treatment:  [x]intact []redness- no adverse reaction 
  []redness  adverse reaction: 60 min Therapeutic Exercise:  [x] See flow sheet :  Added bridges and 1 lb weight to sidelying hip abduction. Rationale: increase ROM, increase strength and improve coordination to improve the patients ability to tolerate prolonged walking, navigate stairs, perform ADL's without pain/limitation. 
 
- held  min Manual Therapy:  Heel prop on one knee while performing MT on opposite LE (used 3 lb weight with heel prop). Posterior femur mobs with 1/2 foam to increase extension. PF mobs inferior and superior. STM to medial hamstring on the right>left. Rationale: decrease pain, increase ROM and increase tissue extensibility  to improve the patients ability to  tolerate prolonged walking, navigate stairs, perform ADL's without pain/limitation. - min Gait Training:  ___ feet with ___ device on level surfaces with ___ level of assist  
Rationale: increase ROM, improve coordination and gait mechanics  to improve the patients ability to  tolerate prolonged walking, navigate stairs, perform ADL's without pain/limitation. With 
 [] TE 
 [] TA 
 [] neuro 
 [] other: Patient Education: [x] Review HEP [] Progressed/Changed HEP based on:  
[] positioning   [] body mechanics   [] transfers   [] heat/ice application   
[] other:   
 
Other Objective/Functional Measures: - 
 
Pain Level (0-10 scale) post treatment: 0  
  
ASSESSMENT/Changes in Function: 
Pt 20 ' late to appointment so held on MT for today. Pt tolerated progression in ther. Ex. Well. Patient will continue to benefit from skilled PT services to modify and progress therapeutic interventions, address functional mobility deficits, address ROM deficits, address strength deficits, analyze and cue movement patterns and instruct in home and community integration to attain remaining goals. []  See Plan of Care 
[]  See progress note/recertification 
[]  See Discharge Summary Short Term Goals: To be accomplished in 4 weeks: 1) Pt will be Independent with HEP. MET 
2) Pt will demonstrate knee extension >/= to 0 degrees ext to aid in ambulation. 3) Pt will be able to ambulate >/= 1 block with normalized gait pattern on level terrain without AD. 4) Pt will demonstrate knee flexion >/= to 120 to aid in performing functional transfers. MET  
  
Long Term Goals: To be accomplished in 8 weeks: 
1) Pt will be able to navigate a flight of stairs without pain. 2) Pt will be able to ambulate on uneven terrain without pain or instability. 3) Pt will be able to perform LE dressing without pain. 4) Pt will report improvement in overall functional mobility, as measured by FOTO, with an increased score of at least 22 points, from 39 to 61.  
  
PLAN [x]  Upgrade activities as tolerated     [x]  Continue plan of care 
[]  Update interventions per flow sheet      
[]  Discharge due to:_ 
[x]  Other Continue PT through august with focus on functional strengthening as ROM is progressing nicely and then determine need for further PT. 
  
 
Yared Lopez, PT 8/30/2018  3:40 PM

## 2018-08-31 ENCOUNTER — PATIENT OUTREACH (OUTPATIENT)
Dept: OTHER | Age: 68
End: 2018-08-31

## 2018-08-31 NOTE — PROGRESS NOTES
After emails sent, but no response received, this CM contacted [facility]. Was connected with [facility] representative- Pamela Dao who stated that claims were not denied, but information on itemized bill was missing and was not processed. Pamela Dao conferenced this CM with [facility] Billing and spoke with Noemi Cabrera who resubmitted claim to Stephany Sanchez stated that patient may get statement for $[phone] but that claim is reprocessing and is not due at this time. This CM relayed this information to patient, and recommended contacting [employer] or this CM for any needs in the future. Pt is going back to work- doing very well, continues with PT, but denied any other concerns at this time. Will contact this CM for any future needs, or if billing is not resolved and processed correctly.

## 2018-09-04 ENCOUNTER — APPOINTMENT (OUTPATIENT)
Dept: PHYSICAL THERAPY | Age: 68
End: 2018-09-04
Payer: COMMERCIAL

## 2018-09-06 ENCOUNTER — HOSPITAL ENCOUNTER (OUTPATIENT)
Dept: PHYSICAL THERAPY | Age: 68
Discharge: HOME | End: 2018-09-06
Payer: COMMERCIAL

## 2018-09-06 PROCEDURE — 97140 MANUAL THERAPY 1/> REGIONS: CPT | Performed by: PHYSICAL THERAPIST

## 2018-09-06 PROCEDURE — 97110 THERAPEUTIC EXERCISES: CPT | Performed by: PHYSICAL THERAPIST

## 2018-09-06 NOTE — PROGRESS NOTES
PT DAILY TREATMENT NOTE 2-15 Patient Name: Shila Mitchell Date:2018 : 1950 [x]  Patient  Verified Payor: Gonzalo Casas / Plan: [place] / Product Type: PPO / In time: 415 PM  Out time: 5 Total Treatment Time (min):   80 Timed 65 Visit #: 22 Treatment Area: Bilateral knee pain [M25.561, M25.562] SUBJECTIVE 
 
** Pt arrived 13 ' late. Reported she was caught up at work. ** 
 
Pain Level (0-10 scale): Pt reports her right knee still bothers her the most, left knee is feeling good. She likes to come to PT because she is held accountable to do her exercises, can be hard for her to self motivate to do the exercises on her own. Any medication changes, allergies to medications, adverse drug reactions, diagnosis change, or new procedure performed?: [x] No    [] Yes (see summary sheet for update) Subjective functional status/changes:   [] No changes reported OBJECTIVE Discussed punctuality of PT appointments. Modality rationale: decrease edema, decrease inflammation and decrease pain to improve the patients ability to tolerate prolonged walking, navigate stairs, perform ADL's without pain/limitation. Min Type Additional Details  
 [] Estim: []Att   []Unatt        []TENS instruct []IFC  []Premod   []NMES []Other:  []w/US   []w/ice   []w/heat Position: Location:  
 []  Traction: [] Cervical       []Lumbar 
                     [] Prone          []Supine []Intermittent   []Continuous Lbs: 
[] before manual 
[] after manual 
[]w/heat  
 []  Ultrasound: []Continuous   [] Pulsed at:  
                         []1MHz   []3MHz Location: 
W/cm2:  
 []  Paraffin Location: 
[]w/heat  
10 [x]  Ice     []  Heat 
[]  Ice massage Position: supine with Le's elevated Location: B knees  
 []  Laser 
[]  Other: Position: Location: [x]  Vasopneumatic Device bilateral knee  Pressure:       [] lo [x] med [] hi  
Temperature: 42 deg. [x] Skin assessment post-treatment:  [x]intact []redness- no adverse reaction 
  []redness  adverse reaction:  
 
55 min Therapeutic Exercise:  [x] See flow sheet :   
 co-treat with [facility] PT Rationale: increase ROM, increase strength and improve coordination to improve the patients ability to tolerate prolonged walking, navigate stairs, perform ADL's without pain/limitation. 10  min Manual Therapy:  Heel prop on one knee while performing MT on opposite LE (used 3 lb weight with heel prop) 5 ' at a time Posterior femur mobs with 1/2 foam to increase extension. PF mobs inferior and superior. Rationale: decrease pain, increase ROM and increase tissue extensibility  to improve the patients ability to  tolerate prolonged walking, navigate stairs, perform ADL's without pain/limitation. - min Gait Training:  ___ feet with ___ device on level surfaces with ___ level of assist  
Rationale: increase ROM, improve coordination and gait mechanics  to improve the patients ability to  tolerate prolonged walking, navigate stairs, perform ADL's without pain/limitation. With 
 [] TE 
 [] TA 
 [] neuro 
 [] other: Patient Education: [x] Review HEP [] Progressed/Changed HEP based on:  
[] positioning   [] body mechanics   [] transfers   [] heat/ice application   
[] other:   
 
Other Objective/Functional Measures: - 
 
Pain Level (0-10 scale) post treatment: 1 ASSESSMENT/Changes in Function: 
Patient will continue to benefit from skilled PT services to modify and progress therapeutic interventions, address functional mobility deficits, address ROM deficits, address strength deficits, analyze and cue movement patterns and instruct in home and community integration to attain remaining goals. []  See Plan of Care 
[]  See progress note/recertification 
[]  See Discharge Summary Short Term Goals: To be accomplished in 4 weeks: 
1) Pt will be Independent with HEP. MET 
2) Pt will demonstrate knee extension >/= to 0 degrees ext to aid in ambulation. 3) Pt will be able to ambulate >/= 1 block with normalized gait pattern on level terrain without AD. 4) Pt will demonstrate knee flexion >/= to 120 to aid in performing functional transfers. MET  
  
Long Term Goals: To be accomplished in 8 weeks: 
1) Pt will be able to navigate a flight of stairs without pain. 2) Pt will be able to ambulate on uneven terrain without pain or instability. 3) Pt will be able to perform LE dressing without pain. 4) Pt will report improvement in overall functional mobility, as measured by FOTO, with an increased score of at least 22 points, from 39 to 61.  
  
PLAN [x]  Upgrade activities as tolerated     [x]  Continue plan of care 
[]  Update interventions per flow sheet      
[]  Discharge due to:_ 
[x]  Other  Pt may be ready for 1 x every other week progress to D/C to [facility], PT 9/6/2018  3:40 PM

## 2018-09-13 ENCOUNTER — HOSPITAL ENCOUNTER (OUTPATIENT)
Dept: PHYSICAL THERAPY | Age: 68
Discharge: HOME | End: 2018-09-13
Payer: COMMERCIAL

## 2018-09-13 PROCEDURE — 97140 MANUAL THERAPY 1/> REGIONS: CPT | Performed by: PHYSICAL THERAPIST

## 2018-09-13 PROCEDURE — 97110 THERAPEUTIC EXERCISES: CPT | Performed by: PHYSICAL THERAPIST

## 2018-09-13 NOTE — PROGRESS NOTES
PT DAILY TREATMENT NOTE 2-15 Patient Name: Radu Fields Date:2018 : 1950 [x]  Patient  Verified Payor: Bonita Sousa / Plan: Faye January / Product Type: PPO / In time: 450 PM  Out time: 610 Total Treatment Time (min): 80 Timed 65 Visit #: 18 Treatment Area: Bilateral knee pain [M25.561, M25.562] SUBJECTIVE Pain Level (0-10 scale): Pt reports her right knee still bothers her the most, irritates her but the left side feels the weakest.  She is wondering if this is due to her hx of L DAISY. Overall, she is feeling good today, pain is 0/10. Any medication changes, allergies to medications, adverse drug reactions, diagnosis change, or new procedure performed?: [x] No    [] Yes (see summary sheet for update) Subjective functional status/changes:   [] No changes reported OBJECTIVE Modality rationale: decrease edema, decrease inflammation and decrease pain to improve the patients ability to tolerate prolonged walking, navigate stairs, perform ADL's without pain/limitation. Min Type Additional Details  
 [] Estim: []Att   []Unatt        []TENS instruct []IFC  []Premod   []NMES []Other:  []w/US   []w/ice   []w/heat Position: Location:  
 []  Traction: [] Cervical       []Lumbar 
                     [] Prone          []Supine []Intermittent   []Continuous Lbs: 
[] before manual 
[] after manual 
[]w/heat  
 []  Ultrasound: []Continuous   [] Pulsed at:  
                         []1MHz   []3MHz Location: 
W/cm2:  
 []  Paraffin Location: 
[]w/heat  
10 [x]  Ice     []  Heat 
[]  Ice massage Position: supine with Le's elevated Location: B knees  
 []  Laser 
[]  Other: Position: Location:  
 [x]  Vasopneumatic Device bilateral knee  Pressure:       [] lo [x] med [] hi  
Temperature: 42 deg. [x] Skin assessment post-treatment:  [x]intact []redness- no adverse reaction []redness  adverse reaction:  
 
55 min Therapeutic Exercise:  [x] See flow sheet :  Review of clams, gave corrections. Had her perform SLR as she did not perform in A.M. Today. Discussed and encouraged cont. HEP and aquatic exercise following completion of PT. Rationale: increase ROM, increase strength and improve coordination to improve the patients ability to tolerate prolonged walking, navigate stairs, perform ADL's without pain/limitation. 10  min Manual Therapy:  Heel prop on one knee while performing MT on opposite LE (used 3 lb weight with heel prop) 3 ' at a time Posterior femur mobs with 1/2 foam to increase extension. PF mobs inferior and superior. Rationale: decrease pain, increase ROM and increase tissue extensibility  to improve the patients ability to  tolerate prolonged walking, navigate stairs, perform ADL's without pain/limitation. - min Gait Training:  ___ feet with ___ device on level surfaces with ___ level of assist  
Rationale: increase ROM, improve coordination and gait mechanics  to improve the patients ability to  tolerate prolonged walking, navigate stairs, perform ADL's without pain/limitation. With 
 [] TE 
 [] TA 
 [] neuro 
 [] other: Patient Education: [x] Review HEP [] Progressed/Changed HEP based on:  
[] positioning   [] body mechanics   [] transfers   [] heat/ice application   
[] other:   
 
Other Objective/Functional Measures: - 
 
Pain Level (0-10 scale) post treatment: 0 
  
ASSESSMENT/Changes in Function: 
Pt tolerated session well, has been performing HEP at home to include hip strengthening and corrected technique of clams today. Pt decreased to 1x/week this week and next week and will likely be ready for independent HEP after next visit.      
Patient will continue to benefit from skilled PT services to modify and progress therapeutic interventions, address functional mobility deficits, address ROM deficits, address strength deficits, analyze and cue movement patterns and instruct in home and community integration to attain remaining goals. []  See Plan of Care 
[]  See progress note/recertification 
[]  See Discharge Summary Short Term Goals: To be accomplished in 4 weeks: 
1) Pt will be Independent with HEP. MET 
2) Pt will demonstrate knee extension >/= to 0 degrees ext to aid in ambulation. 3) Pt will be able to ambulate >/= 1 block with normalized gait pattern on level terrain without AD. MET 
4) Pt will demonstrate knee flexion >/= to 120 to aid in performing functional transfers. MET  
  
Long Term Goals: To be accomplished in 8 weeks: 
1) Pt will be able to navigate a flight of stairs without pain. 2) Pt will be able to ambulate on uneven terrain without pain or instability. 3) Pt will be able to perform LE dressing without pain. 4) Pt will report improvement in overall functional mobility, as measured by FOTO, with an increased score of at least 22 points, from 39 to 61.  
  
PLAN [x]  Upgrade activities as tolerated     [x]  Continue plan of care 
[]  Update interventions per flow sheet      
[]  Discharge due to:_ 
[x]  Other  Pt scheduled for 1 visit next week then likely ready for D/C to [facility], PT 9/13/2018  3:40 PM

## 2018-09-20 ENCOUNTER — HOSPITAL ENCOUNTER (OUTPATIENT)
Dept: PHYSICAL THERAPY | Age: 68
Discharge: HOME | End: 2018-09-20
Payer: COMMERCIAL

## 2018-09-20 PROCEDURE — 97110 THERAPEUTIC EXERCISES: CPT | Performed by: PHYSICAL THERAPIST

## 2018-09-20 PROCEDURE — 97140 MANUAL THERAPY 1/> REGIONS: CPT | Performed by: PHYSICAL THERAPIST

## 2018-09-20 NOTE — PROGRESS NOTES
PT Discharge Note / DAILY TREATMENT NOTE 2-15 Patient Name: Judeth Cowden Date:2018 : 1950 [x]  Patient  Verified Payor: Estiven Slater / Plan: Melita Quivers / Product Type: PPO / In time: 505 PM  Out time: 630 Total Treatment Time (min): 85 Timed 70 Visit #: 15 
 
Treatment Area: Bilateral knee pain [M25.561, M25.562] SUBJECTIVE Pain Level (0-10 scale): Pt reports pain is 1/10. Still feels like R knee gives her the most pain and L LE feels weaker. She reports she does not have any pain walking her dog on uneven ground although she is careful. She can negotiate a flight of stairs but at the end of the day after being on her feet she has more difficulty. She can don/doff pants/shorts easily and can even do this from standing position. With bending her knee to end range like when she is don/doff shoes and socks she does have some pain. Any medication changes, allergies to medications, adverse drug reactions, diagnosis change, or new procedure performed?: [x] No    [] Yes (see summary sheet for update) Subjective functional status/changes:   [] No changes reported OBJECTIVE 
 
ROM:  R knee + 1 to 130 deg L knee + 1 to 128 deg. Functional strength: able to perform 2x10 FW 6 \" step ups. Gait: slight antalgic gait today, pt notes had to stand for several hours for work. FOTO:  55 (was 39 at evaluation) Modality rationale: decrease edema, decrease inflammation and decrease pain to improve the patients ability to tolerate prolonged walking, navigate stairs, perform ADL's without pain/limitation. Min Type Additional Details  
 [] Estim: []Att   []Unatt        []TENS instruct []IFC  []Premod   []NMES []Other:  []w/US   []w/ice   []w/heat Position: Location:  
 []  Traction: [] Cervical       []Lumbar 
                     [] Prone          []Supine []Intermittent   []Continuous Lbs: 
[] before manual 
[] after manual 
[]w/heat  
 []  Ultrasound: []Continuous   [] Pulsed at:  
                         []1MHz   []3MHz Location: 
W/cm2:  
 []  Paraffin Location: 
[]w/heat  
10 [x]  Ice     []  Heat 
[]  Ice massage Position: supine with Le's elevated Location: B knees  
 []  Laser 
[]  Other: Position: Location:  
 [x]  Vasopneumatic Device bilateral knee  Pressure:       [] lo [x] med [] hi  
Temperature: 42 deg. [x] Skin assessment post-treatment:  [x]intact []redness- no adverse reaction 
  []redness  adverse reaction:  
 
60 min Therapeutic Exercise:  [x] See flow sheet :  Discussed importance of cont. HEP following PT as well as aquatic exercise program.   
Rationale: increase ROM, increase strength and improve coordination to improve the patients ability to tolerate prolonged walking, navigate stairs, perform ADL's without pain/limitation. 10  min Manual Therapy:  Heel prop on one knee while performing MT on opposite LE (used 3 lb weight with heel prop) 5 ' at a time Posterior femur mobs with 1/2 foam to increase extension. PF mobs inferior and superior. Rationale: decrease pain, increase ROM and increase tissue extensibility  to improve the patients ability to  tolerate prolonged walking, navigate stairs, perform ADL's without pain/limitation. - min Gait Training:  ___ feet with ___ device on level surfaces with ___ level of assist  
Rationale: increase ROM, improve coordination and gait mechanics  to improve the patients ability to  tolerate prolonged walking, navigate stairs, perform ADL's without pain/limitation. With 
 [] TE 
 [] TA 
 [] neuro 
 [] other: Patient Education: [x] Review HEP [] Progressed/Changed HEP based on:  
[] positioning   [] body mechanics   [] transfers   [] heat/ice application   
[] other:   
 
Other Objective/Functional Measures:  See above. Pain Level (0-10 scale) post treatment: 0 
  
ASSESSMENT/Changes in Function: Pt with 15 skilled PT visits 07/24/18 through 09/20/18. Pt shows improved functional strength, improved ROM, decreased pain and improved ability to perform ADL's. She has also returned to work in [employer]. Pt is with good understanding of HEP as well as recommendations that she continue aquatic exercise. We feel that due to excellent progress to goals pt is ready to continue on her own at this time. We encouraged her to continue HEP and to contact us if she has any questions. Short Term Goals: To be accomplished in 4 weeks: 
1) Pt will be Independent with HEP. MET 
2) Pt will demonstrate knee extension >/= to 0 degrees ext to aid in ambulation. Partially met improved to - 1 B/L, encouraged pt to continue with heel prop. 3) Pt will be able to ambulate >/= 1 block with normalized gait pattern on level terrain without AD. MET 
4) Pt will demonstrate knee flexion >/= to 120 to aid in performing functional transfers. MET  
  
Long Term Goals: To be accomplished in 8 weeks: 
1) Pt will be able to navigate a flight of stairs without pain. MET but difficult at the end of the day and R knee can be 1/10 at times. 2) Pt will be able to ambulate on uneven terrain without pain or instability. MET 
3) Pt will be able to perform LE dressing without pain. MET 
4) Pt will report improvement in overall functional mobility, as measured by FOTO, with an increased score of at least 22 points, from 39 to 61. Partially MET improved to 55 from 39.  
  
PLAN 
D/C to [address] , PT 9/20/2018

## 2018-09-20 NOTE — ANCILLARY DISCHARGE INSTRUCTIONS
PT Discharge Note / DAILY TREATMENT NOTE 2-15 
  
Patient Name: Boone Rivas Date:2018 : 1950 [x]  Patient  Verified Payor: Basilia Davison / Plan: Murleen Cogan / Product Type: PPO / In time: 505 PM  Out time: 630 Total Treatment Time (min): 85 Timed 70 Visit #: 15 
  
Treatment Area: Bilateral knee pain [M25.561, M25.562] 
  
SUBJECTIVE 
  
Pain Level (0-10 scale): Pt reports pain is 1/10. Still feels like R knee gives her the most pain and L LE feels weaker. She reports she does not have any pain walking her dog on uneven ground although she is careful. She can negotiate a flight of stairs but at the end of the day after being on her feet she has more difficulty. She can don/doff pants/shorts easily and can even do this from standing position. With bending her knee to end range like when she is don/doff shoes and socks she does have some pain.   
  
Any medication changes, allergies to medications, adverse drug reactions, diagnosis change, or new procedure performed?: [x] No    [] Yes (see summary sheet for update) Subjective functional status/changes:   [] No changes reported 
  
  
OBJECTIVE 
  
ROM:  R knee + 1 to 130 deg L knee + 1 to 128 deg.  
  
Functional strength: able to perform 2x10 FW 6 \" step ups. 
  
Gait: slight antalgic gait today, pt notes had to stand for several hours for work.  
  
FOTO:  55 (was 39 at evaluation) 
  Modality rationale: decrease edema, decrease inflammation and decrease pain to improve the patients ability to tolerate prolonged walking, navigate stairs, perform ADL's without pain/limitation. Min Type Additional Details  
  [] Estim: []Att   []Unatt        []TENS instruct []IFC  []Premod   []NMES []Other:  []w/US   []w/ice   []w/heat Position: Location:  
  []  Traction: [] Cervical       []Lumbar 
                     [] Prone          []Supine []Intermittent   []Continuous Lbs: 
[] before manual 
[] after manual 
[]w/heat  
  []  Ultrasound: []Continuous   [] Pulsed at:  
                         []1MHz   []3MHz Location: 
W/cm2:  
  []  Paraffin 
   Location: 
[]w/heat  
10 [x]  Ice     []  Heat 
[]  Ice massage Position: supine with Le's elevated Location: B knees  
  []  Laser 
[]  Other: Position: Location:  
  [x]  Vasopneumatic Device bilateral knee  Pressure:       [] lo [x] med [] hi  
Temperature: 42 deg. [x] Skin assessment post-treatment:  [x]intact []redness- no adverse reaction 
  []redness  adverse reaction:  
  
60 min Therapeutic Exercise:  [x] See flow sheet :  Discussed importance of cont. HEP following PT as well as aquatic exercise program.   
Rationale: increase ROM, increase strength and improve coordination to improve the patients ability to tolerate prolonged walking, navigate stairs, perform ADL's without pain/limitation. 
  
10  min Manual Therapy:  Heel prop on one knee while performing MT on opposite LE (used 3 lb weight with heel prop) 5 ' at a time Posterior femur mobs with 1/2 foam to increase extension. PF mobs inferior and superior. Rationale: decrease pain, increase ROM and increase tissue extensibility  to improve the patients ability to  tolerate prolonged walking, navigate stairs, perform ADL's without pain/limitation. 
  
- min Gait Training:  ___ feet with ___ device on level surfaces with ___ level of assist  
Rationale: increase ROM, improve coordination and gait mechanics  to improve the patients ability to  tolerate prolonged walking, navigate stairs, perform ADL's without pain/limitation. 
   
With 
 [] TE 
 [] TA 
 [] neuro 
 [] other: Patient Education: [x] Review HEP [] Progressed/Changed HEP based on:  
[] positioning   [] body mechanics   [] transfers   [] heat/ice application   
[] other:   
  
Other Objective/Functional Measures:  See above. 
  
 Pain Level (0-10 scale) post treatment: 0 
   
ASSESSMENT/Changes in Function: Pt with 15 skilled PT visits 07/24/18 through 09/20/18. Pt shows improved functional strength, improved ROM, decreased pain and improved ability to perform ADL's. She has also returned to work in [employer]. Pt is with good understanding of HEP as well as recommendations that she continue aquatic exercise. We feel that due to excellent progress to goals pt is ready to continue on her own at this time. We encouraged her to continue HEP and to contact us if she has any questions.   
  
   
Short Term Goals: To be accomplished in 4 weeks: 
1) Pt will be Independent with HEP. MET 
2) Pt will demonstrate knee extension >/= to 0 degrees ext to aid in ambulation. Partially met improved to - 1 B/L, encouraged pt to continue with heel prop. 3) Pt will be able to ambulate >/= 1 block with normalized gait pattern on level terrain without AD. MET 
4) Pt will demonstrate knee flexion >/= to 120 to aid in performing functional transfers. MET  
   
Long Term Goals: To be accomplished in 8 weeks: 
1) Pt will be able to navigate a flight of stairs without pain. MET but difficult at the end of the day and R knee can be 1/10 at times. 2) Pt will be able to ambulate on uneven terrain without pain or instability. MET 
3) Pt will be able to perform LE dressing without pain. MET 
4) Pt will report improvement in overall functional mobility, as measured by FOTO, with an increased score of at least 22 points, from 39 to 61.  Partially MET improved to 55 from 39.  
   
PLAN 
D/C to HEP 
   
  
Delmi Pan, PT             9/20/2018

## 2018-10-15 NOTE — ANCILLARY DISCHARGE INSTRUCTIONS
PT Discharge Note 2-15 
   
Patient Name: Olaf Acevedo Date:2018 : 1950 Visit #: 15 
   
Treatment Area: Bilateral knee pain [M25.561, M25.562] 
   
SUBJECTIVE 
   
Pain Level (0-10 scale): Pt reports pain is 1/10.  Still feels like R knee gives her the most pain and L LE feels weaker.  She reports she does not have any pain walking her dog on uneven ground although she is careful.  She can negotiate a flight of stairs but at the end of the day after being on her feet she has more difficulty.  She can don/doff pants/shorts easily and can even do this from standing position.  With bending her knee to end range like when she is don/doff shoes and socks she does have some pain.    
   
OBJECTIVE 
   
ROM:  R knee + 1 to 130 deg 
                      L knee + 1 to 128 deg.  
   
Functional strength: able to perform 2x10 FW 6 \" step ups. 
   
Gait: slight antalgic gait today, pt notes had to stand for several hours for work.  
Sarika Pain 
FOTO:  55 (was 39 at evaluation) 
   
        
 
ASSESSMENT/Changes in Function: Pt with 15 skilled PT visits 18 through 18.  Pt shows improved functional strength, improved ROM, decreased pain and improved ability to perform ADL's. Erum Cullen has also returned to work in [employer]. [employer] is with good understanding of HEP as well as recommendations that she continue aquatic exercise.  We feel that due to excellent progress to goals pt is ready to continue on her own at this time.  We encouraged her to continue HEP and to contact us if she has any questions.   
   
   
Short Term Goals: To be accomplished in 4 weeks: 
1) Pt will be Independent with HEP. MET 
2) Pt will demonstrate knee extension >/= to 0 degrees ext to aid in ambulation.  Partially met improved to - 1 B/L, encouraged pt to continue with heel prop. 3) Pt will be able to ambulate >/= 1 block with normalized gait pattern on level terrain without AD.  MET 
 4) Pt will demonstrate knee flexion >/= to 120 to aid in performing functional transfers. MET  
   
Long Term Goals: To be accomplished in 8 weeks: 
1) Pt will be able to navigate a flight of stairs without pain. MET but difficult at the end of the day and R knee can be 1/10 at times. 2) Pt will be able to ambulate on uneven terrain without pain or instability. MET 
3) Pt will be able to perform LE dressing without pain. MET 
4) Pt will report improvement in overall functional mobility, as measured by FOTO, with an increased score of at least 22 points, from 39 to 61.  Partially MET improved to 55 from 39.  
   
PLAN 
D/C to [address], [address]

## 2018-11-09 DIAGNOSIS — I10 ESSENTIAL HYPERTENSION: ICD-10-CM

## 2018-11-09 RX ORDER — HYDROCHLOROTHIAZIDE 25 MG/1
TABLET ORAL
Qty: 30 TAB | Refills: 0 | Status: SHIPPED | OUTPATIENT
Start: 2018-11-09 | End: 2018-12-05 | Stop reason: SDUPTHER

## 2018-11-30 ENCOUNTER — OFFICE VISIT (OUTPATIENT)
Dept: INTERNAL MEDICINE CLINIC | Age: 68
End: 2018-11-30

## 2018-11-30 VITALS
OXYGEN SATURATION: 99 % | WEIGHT: 178 LBS | HEIGHT: 66 IN | BODY MASS INDEX: 28.61 KG/M2 | SYSTOLIC BLOOD PRESSURE: 150 MMHG | RESPIRATION RATE: 14 BRPM | HEART RATE: 72 BPM | DIASTOLIC BLOOD PRESSURE: 84 MMHG | TEMPERATURE: 97.7 F

## 2018-11-30 DIAGNOSIS — E78.00 HYPERCHOLESTEREMIA: ICD-10-CM

## 2018-11-30 DIAGNOSIS — Z00.00 ROUTINE GENERAL MEDICAL EXAMINATION AT A HEALTH CARE FACILITY: Primary | ICD-10-CM

## 2018-11-30 DIAGNOSIS — I10 ESSENTIAL HYPERTENSION: ICD-10-CM

## 2018-11-30 RX ORDER — LOSARTAN POTASSIUM 25 MG/1
25 TABLET ORAL DAILY
Qty: 30 TAB | Refills: 2 | Status: SHIPPED | OUTPATIENT
Start: 2018-11-30 | End: 2019-02-25 | Stop reason: SDUPTHER

## 2018-11-30 RX ORDER — ASPIRIN 81 MG/1
81 TABLET ORAL DAILY
COMMUNITY

## 2018-11-30 NOTE — PROGRESS NOTES
HISTORY OF PRESENT ILLNESS Ricki Sales is a 77 y.o. female. HPI Subjective: Ricki Sales is a 77 y.o. female with hypertension. Hypertension ROS: taking medications as instructed, no medication side effects noted, no TIA's, no chest pain on exertion, no dyspnea on exertion, no swelling of ankles. New concerns: stable but a little high today. Subjective: Ricki Sales is a 77 y.o. female with hyperlipidemia. Cardiovascular risk analysis - 77 y.o. female LDL goal is under 130. ROS: taking medications as instructed, no medication side effects noted, no TIA's, no chest pain on exertion, no dyspnea on exertion, no swelling of ankles. Tolerating meds, no myalgias or other side effects noted New concerns: tolerating medicine no muscle aches . Review of Systems Constitutional: Negative for chills, diaphoresis, fever, malaise/fatigue and weight loss. HENT: Negative for congestion, ear pain, hearing loss, sore throat and tinnitus. Eyes: Negative for blurred vision and double vision. Respiratory: Negative for cough, hemoptysis, sputum production, shortness of breath and wheezing. Cardiovascular: Negative for chest pain, palpitations, orthopnea, claudication, leg swelling and PND. Gastrointestinal: Negative for abdominal pain, blood in stool, constipation, diarrhea, heartburn, nausea and vomiting. Genitourinary: Negative for dysuria, flank pain, frequency, hematuria and urgency. Musculoskeletal: Negative for back pain, joint pain, myalgias and neck pain. Skin: Negative. Neurological: Negative for dizziness, tingling, sensory change, speech change, focal weakness, seizures, loss of consciousness, weakness and headaches. Endo/Heme/Allergies: Negative for environmental allergies and polydipsia. Does not bruise/bleed easily. Psychiatric/Behavioral: Negative for depression, memory loss, substance abuse and suicidal ideas.  The patient is not nervous/anxious and does not have insomnia. Physical Exam  
Constitutional: She is oriented to person, place, and time. She appears well-developed and well-nourished. HENT:  
Head: Normocephalic and atraumatic. Right Ear: External ear normal.  
Left Ear: External ear normal.  
Nose: Nose normal.  
Mouth/Throat: Oropharynx is clear and moist.  
Eyes: Conjunctivae and EOM are normal. Pupils are equal, round, and reactive to light. Neck: Normal range of motion. Neck supple. Cardiovascular: Normal rate, regular rhythm, normal heart sounds and intact distal pulses. Pulmonary/Chest: Effort normal and breath sounds normal. Right breast exhibits no inverted nipple, no mass, no nipple discharge, no skin change and no tenderness. Left breast exhibits no inverted nipple, no mass, no nipple discharge, no skin change and no tenderness. Breasts are symmetrical.  
Abdominal: Soft. Bowel sounds are normal.  
Genitourinary: Rectum normal. Rectal exam shows anal tone normal. No breast swelling, tenderness, discharge or bleeding. Musculoskeletal: Normal range of motion. Neurological: She is alert and oriented to person, place, and time. Skin: Skin is warm and dry. Psychiatric: She has a normal mood and affect. Her behavior is normal. Judgment and thought content normal.  
Nursing note and vitals reviewed. ASSESSMENT and PLAN Diagnoses and all orders for this visit: 1. Routine general medical examination at a health care facility 2. Essential hypertension- at goal tolerating medicine but a little high so will add something  
-     losartan (COZAAR) 25 mg tablet; Take 1 Tab by mouth daily. 3. Hypercholesteremia-cont with current medicine and toelrating it and no muscle pain This note will not be viewable in [address] lab results and schedule of future lab studies reviewed with patient 
reviewed diet, exercise and weight control 
cardiovascular risk and specific lipid/LDL goals reviewed reviewed medications and side effects in detail Discussed the patient's BMI with her. The BMI follow up plan is as follows:  
 
dietary management education, guidance, and counseling 
encourage exercise 
monitor weight 
prescribed dietary intake An After Visit Summary was printed and given to the patient.

## 2018-11-30 NOTE — PATIENT INSTRUCTIONS
Body Mass Index: Care Instructions Your Care Instructions Body mass index (BMI) can help you see if your weight is raising your risk for health problems. It uses a formula to compare how much you weigh with how tall you are. · A BMI lower than 18.5 is considered underweight. · A BMI between 18.5 and 24.9 is considered healthy. · A BMI between 25 and 29.9 is considered overweight. A BMI of 30 or higher is considered obese. If your BMI is in the normal range, it means that you have a lower risk for weight-related health problems. If your BMI is in the overweight or obese range, you may be at increased risk for weight-related health problems, such as high blood pressure, heart disease, stroke, arthritis or joint pain, and diabetes. If your BMI is in the underweight range, you may be at increased risk for health problems such as fatigue, lower protection (immunity) against illness, muscle loss, bone loss, hair loss, and hormone problems. BMI is just one measure of your risk for weight-related health problems. You may be at higher risk for health problems if you are not active, you eat an unhealthy diet, or you drink too much alcohol or use tobacco products. Follow-up care is a key part of your treatment and safety. Be sure to make and go to all appointments, and call your doctor if you are having problems. It's also a good idea to know your test results and keep a list of the medicines you take. How can you care for yourself at home? · Practice healthy eating habits. This includes eating plenty of fruits, vegetables, whole grains, lean protein, and low-fat dairy. · If your doctor recommends it, get more exercise. Walking is a good choice. Bit by bit, increase the amount you walk every day. Try for at least 30 minutes on most days of the week. · Do not smoke. Smoking can increase your risk for health problems.  If you need help quitting, talk to your doctor about stop-smoking programs and medicines. These can increase your chances of quitting for good. · Limit alcohol to 2 drinks a day for men and 1 drink a day for women. Too much alcohol can cause health problems. If you have a BMI higher than 25 · Your doctor may do other tests to check your risk for weight-related health problems. This may include measuring the distance around your waist. A waist measurement of more than 40 inches in men or 35 inches in women can increase the risk of weight-related health problems. · Talk with your doctor about steps you can take to stay healthy or improve your health. You may need to make lifestyle changes to lose weight and stay healthy, such as changing your diet and getting regular exercise. If you have a BMI lower than 18.5 · Your doctor may do other tests to check your risk for health problems. · Talk with your doctor about steps you can take to stay healthy or improve your health. You may need to make lifestyle changes to gain or maintain weight and stay healthy, such as getting more healthy foods in your diet and doing exercises to build muscle. Where can you learn more? Go to http://rao-alice.info/. Enter S176 in the search box to learn more about \"Body Mass Index: Care Instructions. \" Current as of: October 13, 2016 Content Version: 11.4 © 7327-1940 [employer]. Care instructions adapted under license by [employer] (which disclaims liability or warranty for this information). If you have questions about a medical condition or this instruction, always ask your healthcare professional. [address] any warranty or liability for your use of this information.

## 2018-12-04 DIAGNOSIS — E83.52 HYPERCALCEMIA: Primary | ICD-10-CM

## 2018-12-04 LAB
ALBUMIN SERPL-MCNC: 4.5 G/DL (ref 3.5–4.8)
ALBUMIN/GLOB SERPL: 2 {RATIO} (ref 1.2–2.2)
ALP SERPL-CCNC: 70 IU/L (ref 39–117)
ALT SERPL-CCNC: 26 IU/L (ref 0–32)
AST SERPL-CCNC: 35 IU/L (ref 0–40)
BILIRUB SERPL-MCNC: 0.4 MG/DL (ref 0–1.2)
BUN SERPL-MCNC: 10 MG/DL (ref 8–27)
BUN/CREAT SERPL: 14 (ref 12–28)
CALCIUM SERPL-MCNC: 10.4 MG/DL (ref 8.7–10.3)
CHLORIDE SERPL-SCNC: 98 MMOL/L (ref 96–106)
CHOLEST SERPL-MCNC: 225 MG/DL (ref 100–199)
CO2 SERPL-SCNC: 27 MMOL/L (ref 20–29)
CREAT SERPL-MCNC: 0.71 MG/DL (ref 0.57–1)
GLOBULIN SER CALC-MCNC: 2.2 G/DL (ref 1.5–4.5)
GLUCOSE SERPL-MCNC: 80 MG/DL (ref 65–99)
HDLC SERPL-MCNC: 80 MG/DL
INTERPRETATION, 910389: NORMAL
LDLC SERPL CALC-MCNC: 116 MG/DL (ref 0–99)
POTASSIUM SERPL-SCNC: 3.9 MMOL/L (ref 3.5–5.2)
PROT SERPL-MCNC: 6.7 G/DL (ref 6–8.5)
SODIUM SERPL-SCNC: 138 MMOL/L (ref 134–144)
TRIGL SERPL-MCNC: 145 MG/DL (ref 0–149)
VLDLC SERPL CALC-MCNC: 29 MG/DL (ref 5–40)

## 2018-12-05 DIAGNOSIS — I10 ESSENTIAL HYPERTENSION: ICD-10-CM

## 2018-12-05 RX ORDER — HYDROCHLOROTHIAZIDE 25 MG/1
TABLET ORAL
Qty: 90 TAB | Refills: 0 | Status: SHIPPED | OUTPATIENT
Start: 2018-12-05 | End: 2019-02-27 | Stop reason: SDUPTHER

## 2018-12-09 DIAGNOSIS — E78.00 HYPERCHOLESTEREMIA: ICD-10-CM

## 2018-12-09 RX ORDER — SIMVASTATIN 40 MG/1
TABLET, FILM COATED ORAL
Qty: 90 TAB | Refills: 1 | Status: SHIPPED | OUTPATIENT
Start: 2018-12-09 | End: 2019-06-09 | Stop reason: SDUPTHER

## 2019-02-25 DIAGNOSIS — I10 ESSENTIAL HYPERTENSION: ICD-10-CM

## 2019-02-25 RX ORDER — LOSARTAN POTASSIUM 25 MG/1
TABLET ORAL
Qty: 30 TAB | Refills: 4 | Status: SHIPPED | OUTPATIENT
Start: 2019-02-25 | End: 2019-05-29 | Stop reason: SDUPTHER

## 2019-02-27 RX ORDER — HYDROCHLOROTHIAZIDE 25 MG/1
TABLET ORAL
Qty: 30 TAB | Refills: 0 | Status: SHIPPED | OUTPATIENT
Start: 2019-02-27 | End: 2019-03-30 | Stop reason: SDUPTHER

## 2019-03-04 DIAGNOSIS — I10 ESSENTIAL HYPERTENSION: ICD-10-CM

## 2019-03-04 RX ORDER — HYDROCHLOROTHIAZIDE 25 MG/1
TABLET ORAL
Qty: 90 TAB | Refills: 0 | Status: SHIPPED | OUTPATIENT
Start: 2019-03-04 | End: 2020-06-22

## 2019-03-30 DIAGNOSIS — I10 ESSENTIAL HYPERTENSION: ICD-10-CM

## 2019-03-30 RX ORDER — HYDROCHLOROTHIAZIDE 25 MG/1
TABLET ORAL
Qty: 30 TAB | Refills: 0 | Status: SHIPPED | OUTPATIENT
Start: 2019-03-30 | End: 2019-04-29 | Stop reason: SDUPTHER

## 2019-04-29 DIAGNOSIS — I10 ESSENTIAL HYPERTENSION: ICD-10-CM

## 2019-04-29 RX ORDER — HYDROCHLOROTHIAZIDE 25 MG/1
TABLET ORAL
Qty: 30 TAB | Refills: 0 | Status: SHIPPED | OUTPATIENT
Start: 2019-04-29 | End: 2019-05-31 | Stop reason: SDUPTHER

## 2019-05-09 ENCOUNTER — OFFICE VISIT (OUTPATIENT)
Dept: INTERNAL MEDICINE CLINIC | Age: 69
End: 2019-05-09

## 2019-05-09 VITALS
TEMPERATURE: 98 F | OXYGEN SATURATION: 98 % | WEIGHT: 180.2 LBS | SYSTOLIC BLOOD PRESSURE: 136 MMHG | HEIGHT: 66 IN | HEART RATE: 71 BPM | DIASTOLIC BLOOD PRESSURE: 84 MMHG | RESPIRATION RATE: 16 BRPM | BODY MASS INDEX: 28.96 KG/M2

## 2019-05-09 DIAGNOSIS — E78.00 HYPERCHOLESTEREMIA: ICD-10-CM

## 2019-05-09 DIAGNOSIS — I10 ESSENTIAL HYPERTENSION: Primary | ICD-10-CM

## 2019-05-09 DIAGNOSIS — E83.52 HYPERCALCEMIA: ICD-10-CM

## 2019-05-09 DIAGNOSIS — Z78.0 POST-MENOPAUSAL: ICD-10-CM

## 2019-05-09 RX ORDER — NIACIN 50 MG
TABLET ORAL
COMMUNITY
End: 2019-05-09

## 2019-05-09 NOTE — PROGRESS NOTES
Subjective Cheikh Alvarez is a 68 y.o. female. HPI Hypertension ROS: taking medications as instructed, no medication side effects noted, no TIA's, no chest pain on exertion, no dyspnea on exertion, no swelling of ankles. New concerns:  Patient's BP in office today is 136/84. Hyperlipidemia: 
Cardiovascular risk analysis - 68 y.o. female, obese. ROS: taking medications as instructed, no medication side effects noted, no TIA's, no chest pain on exertion, no dyspnea on exertion, no swelling of ankles. Tolerating meds, no myalgias or other side effects noted New concerns: Pt states she swims for exercise for 30 minutes about 2 times a week. She adds she has incorporated more protein in her diet. She mentions she is a vegetarian. Hearing loss: 
Pt states she has limited hearing in her right ear. She states she had an auditory test recently which confirmed hearing loss. She notes the hearing test was completed as a part of a hearing aid company's seminar. She denies going to audiologist. 
 
Health maintenance:  
Pt's last bone density test was in 2015. Pt has not had Shingrex vaccine. She notes she is taking Niacin 500MG and asks if this is necessary to continue. She adds she takes Azzie Kitchen as well. Review of Systems All other systems reviewed and are negative. Objective Physical Exam  
Cardiovascular: Normal rate, regular rhythm and normal heart sounds. Pulmonary/Chest: Breath sounds normal. Right breast exhibits no inverted nipple, no mass, no nipple discharge, no skin change and no tenderness. Left breast exhibits no inverted nipple, no mass, no nipple discharge, no skin change and no tenderness. Breasts are symmetrical.  
Lymphadenopathy:  
  She has no cervical adenopathy. Right cervical: No superficial cervical, no deep cervical and no posterior cervical adenopathy present.  
     Left cervical: No superficial cervical, no deep cervical and no posterior cervical adenopathy present. She has no axillary adenopathy. Right axillary: No pectoral and no lateral adenopathy present. Left axillary: No pectoral and no lateral adenopathy present. Assessment & Plan Diagnoses and all orders for this visit: 1. Essential hypertension -     METABOLIC PANEL, COMPREHENSIVE 
-     CBC W/O DIFF 2. Hypercholesteremia -     LIPID PANEL 3. Post-menopausal 
-     DEXA BONE DENSITY STUDY AXIAL; Future - Advised pt to get Shingrex vaccine - Stop Niacin. 4. Hypercalcemia 
-     PTH INTACT 
      - Pt was encouraged to seek further evaluation for a hearing test by an audiologist or ENT specialist.  
      - F/u in 6 months. This document was written by Diana Watkins, as dictated by Dr. Jonah Schirmer, MD 
 
This note will not be viewable in [address]

## 2019-05-10 LAB
ALBUMIN SERPL-MCNC: 4.5 G/DL (ref 3.5–4.8)
ALBUMIN/GLOB SERPL: 2 {RATIO} (ref 1.2–2.2)
ALP SERPL-CCNC: 63 IU/L (ref 39–117)
ALT SERPL-CCNC: 21 IU/L (ref 0–32)
AST SERPL-CCNC: 26 IU/L (ref 0–40)
BILIRUB SERPL-MCNC: 0.4 MG/DL (ref 0–1.2)
BUN SERPL-MCNC: 8 MG/DL (ref 8–27)
BUN/CREAT SERPL: 10 (ref 12–28)
CALCIUM SERPL-MCNC: 10.6 MG/DL (ref 8.7–10.3)
CHLORIDE SERPL-SCNC: 101 MMOL/L (ref 96–106)
CHOLEST SERPL-MCNC: 214 MG/DL (ref 100–199)
CO2 SERPL-SCNC: 27 MMOL/L (ref 20–29)
CREAT SERPL-MCNC: 0.82 MG/DL (ref 0.57–1)
ERYTHROCYTE [DISTWIDTH] IN BLOOD BY AUTOMATED COUNT: 13.7 % (ref 12.3–15.4)
GLOBULIN SER CALC-MCNC: 2.2 G/DL (ref 1.5–4.5)
GLUCOSE SERPL-MCNC: 83 MG/DL (ref 65–99)
HCT VFR BLD AUTO: 39 % (ref 34–46.6)
HDLC SERPL-MCNC: 79 MG/DL
HGB BLD-MCNC: 13.3 G/DL (ref 11.1–15.9)
INTERPRETATION, 910389: NORMAL
LDLC SERPL CALC-MCNC: 115 MG/DL (ref 0–99)
MCH RBC QN AUTO: 30.5 PG (ref 26.6–33)
MCHC RBC AUTO-ENTMCNC: 34.1 G/DL (ref 31.5–35.7)
MCV RBC AUTO: 89 FL (ref 79–97)
PLATELET # BLD AUTO: 284 X10E3/UL (ref 150–379)
POTASSIUM SERPL-SCNC: 4.2 MMOL/L (ref 3.5–5.2)
PROT SERPL-MCNC: 6.7 G/DL (ref 6–8.5)
PTH-INTACT SERPL-MCNC: 51 PG/ML (ref 15–65)
RBC # BLD AUTO: 4.36 X10E6/UL (ref 3.77–5.28)
SODIUM SERPL-SCNC: 141 MMOL/L (ref 134–144)
TRIGL SERPL-MCNC: 99 MG/DL (ref 0–149)
VLDLC SERPL CALC-MCNC: 20 MG/DL (ref 5–40)
WBC # BLD AUTO: 5.8 X10E3/UL (ref 3.4–10.8)

## 2019-05-11 NOTE — PROGRESS NOTES
Can you call her and let her know that her calcium is a little higher than last time - I want her to see endocrine to discuss this more!

## 2019-05-13 ENCOUNTER — TELEPHONE (OUTPATIENT)
Dept: INTERNAL MEDICINE CLINIC | Age: 69
End: 2019-05-13

## 2019-05-13 NOTE — TELEPHONE ENCOUNTER
----- Message from Nikia Banerjee MD sent at 5/11/2019 10:56 AM EDT -----  Can you call her and let her know that her calcium is a little higher than last time - I want her to see endocrine to discuss this more!

## 2019-05-13 NOTE — TELEPHONE ENCOUNTER
----- Message from Margie Schneider MD sent at 5/11/2019 10:56 AM EDT -----  Can you call her and let her know that her calcium is a little higher than last time - I want her to see endocrine to discuss this more!

## 2019-05-13 NOTE — TELEPHONE ENCOUNTER
----- Message from Anson Buitrago sent at 5/13/2019  3:47 PM EDT -----  Regarding: Dr. Charles Good returning missed call from nurse regarding bloodwork results. Best contact number [phone].

## 2019-05-14 NOTE — TELEPHONE ENCOUNTER
Contacted pt and advise lab results with recommended evaluation with endo. Pt understood and will schedule.

## 2019-05-29 DIAGNOSIS — I10 ESSENTIAL HYPERTENSION: ICD-10-CM

## 2019-05-29 RX ORDER — LOSARTAN POTASSIUM 25 MG/1
TABLET ORAL
Qty: 30 TAB | Refills: 3 | Status: SHIPPED | OUTPATIENT
Start: 2019-05-29 | End: 2019-08-27 | Stop reason: SDUPTHER

## 2019-06-09 DIAGNOSIS — E78.00 HYPERCHOLESTEREMIA: ICD-10-CM

## 2019-06-09 RX ORDER — SIMVASTATIN 40 MG/1
TABLET, FILM COATED ORAL
Qty: 90 TAB | Refills: 1 | Status: SHIPPED | OUTPATIENT
Start: 2019-06-09 | End: 2019-12-04 | Stop reason: SDUPTHER

## 2019-08-27 DIAGNOSIS — I10 ESSENTIAL HYPERTENSION: ICD-10-CM

## 2019-08-27 RX ORDER — LOSARTAN POTASSIUM 25 MG/1
TABLET ORAL
Qty: 90 TAB | Refills: 1 | Status: SHIPPED | OUTPATIENT
Start: 2019-08-27 | End: 2020-02-19

## 2019-12-04 DIAGNOSIS — E78.00 HYPERCHOLESTEREMIA: ICD-10-CM

## 2019-12-04 RX ORDER — SIMVASTATIN 40 MG/1
TABLET, FILM COATED ORAL
Qty: 90 TAB | Refills: 1 | Status: SHIPPED | OUTPATIENT
Start: 2019-12-04 | End: 2020-06-01

## 2020-02-19 DIAGNOSIS — I10 ESSENTIAL HYPERTENSION: ICD-10-CM

## 2020-02-19 RX ORDER — LOSARTAN POTASSIUM 25 MG/1
TABLET ORAL
Qty: 30 TAB | Refills: 0 | Status: SHIPPED | OUTPATIENT
Start: 2020-02-19 | End: 2020-03-23

## 2020-03-22 DIAGNOSIS — I10 ESSENTIAL HYPERTENSION: ICD-10-CM

## 2020-03-22 RX ORDER — HYDROCHLOROTHIAZIDE 25 MG/1
TABLET ORAL
Qty: 90 TAB | Refills: 0 | Status: SHIPPED | OUTPATIENT
Start: 2020-03-22 | End: 2020-04-30

## 2020-03-23 DIAGNOSIS — I10 ESSENTIAL HYPERTENSION: ICD-10-CM

## 2020-03-23 RX ORDER — LOSARTAN POTASSIUM 25 MG/1
TABLET ORAL
Qty: 30 TAB | Refills: 0 | Status: SHIPPED | OUTPATIENT
Start: 2020-03-23 | End: 2020-04-22

## 2020-03-23 NOTE — TELEPHONE ENCOUNTER
Pt will need an appt we can do  Virtual if they have mychart or get them signed up for it ; can also see them in the office if you reassure them

## 2020-04-22 DIAGNOSIS — I10 ESSENTIAL HYPERTENSION: ICD-10-CM

## 2020-04-22 RX ORDER — LOSARTAN POTASSIUM 25 MG/1
TABLET ORAL
Qty: 30 TAB | Refills: 0 | Status: SHIPPED | OUTPATIENT
Start: 2020-04-22 | End: 2020-04-30

## 2020-04-29 ENCOUNTER — TELEPHONE (OUTPATIENT)
Dept: INTERNAL MEDICINE CLINIC | Age: 70
End: 2020-04-29

## 2020-04-29 NOTE — TELEPHONE ENCOUNTER
Patient is requesting to speak with a nurse, states she may have Possible shingles , has a  red line/  rash  around her left side of her waist that's about a half inch thick, another red bloch under her right breast , slight itch .  States she does go for walks regularly with her dog and not sure if it could be something else, said she did have shingle perviously in her eye, only had the first shingles shot not the second,   She can be reached at [phone]

## 2020-04-30 ENCOUNTER — OFFICE VISIT (OUTPATIENT)
Dept: INTERNAL MEDICINE CLINIC | Age: 70
End: 2020-04-30

## 2020-04-30 ENCOUNTER — HOSPITAL ENCOUNTER (OUTPATIENT)
Dept: LAB | Age: 70
Discharge: HOME | End: 2020-04-30

## 2020-04-30 VITALS
DIASTOLIC BLOOD PRESSURE: 86 MMHG | SYSTOLIC BLOOD PRESSURE: 154 MMHG | HEART RATE: 71 BPM | WEIGHT: 185.2 LBS | TEMPERATURE: 98.1 F | HEIGHT: 66 IN | RESPIRATION RATE: 16 BRPM | BODY MASS INDEX: 29.77 KG/M2 | OXYGEN SATURATION: 99 %

## 2020-04-30 DIAGNOSIS — E78.00 HYPERCHOLESTEREMIA: ICD-10-CM

## 2020-04-30 DIAGNOSIS — R21 RASH: ICD-10-CM

## 2020-04-30 DIAGNOSIS — I10 ESSENTIAL HYPERTENSION: ICD-10-CM

## 2020-04-30 DIAGNOSIS — Z00.00 MEDICARE ANNUAL WELLNESS VISIT, SUBSEQUENT: Primary | ICD-10-CM

## 2020-04-30 DIAGNOSIS — Z12.31 VISIT FOR SCREENING MAMMOGRAM: ICD-10-CM

## 2020-04-30 PROBLEM — B02.30 HERPES ZOSTER OPHTHALMICUS: Status: ACTIVE | Noted: 2017-09-11

## 2020-04-30 LAB
ALBUMIN SERPL-MCNC: 4.4 G/DL (ref 3.5–5)
ALBUMIN/GLOB SERPL: 1.5 {RATIO} (ref 1.1–2.2)
ALP SERPL-CCNC: 72 U/L (ref 45–117)
ALT SERPL-CCNC: 42 U/L (ref 12–78)
ANION GAP SERPL CALC-SCNC: 5 MMOL/L (ref 5–15)
AST SERPL-CCNC: 30 U/L (ref 15–37)
BILIRUB SERPL-MCNC: 0.4 MG/DL (ref 0.2–1)
BUN SERPL-MCNC: 13 MG/DL (ref 6–20)
BUN/CREAT SERPL: 18 (ref 12–20)
CALCIUM SERPL-MCNC: 10.3 MG/DL (ref 8.5–10.1)
CHLORIDE SERPL-SCNC: 103 MMOL/L (ref 97–108)
CHOLEST SERPL-MCNC: 254 MG/DL
CO2 SERPL-SCNC: 30 MMOL/L (ref 21–32)
CREAT SERPL-MCNC: 0.74 MG/DL (ref 0.55–1.02)
ERYTHROCYTE [DISTWIDTH] IN BLOOD BY AUTOMATED COUNT: 13.2 % (ref 11.5–14.5)
GLOBULIN SER CALC-MCNC: 3 G/DL (ref 2–4)
GLUCOSE SERPL-MCNC: 89 MG/DL (ref 65–100)
HCT VFR BLD AUTO: 39.1 % (ref 35–47)
HDLC SERPL-MCNC: 71 MG/DL
HDLC SERPL: 3.6 {RATIO} (ref 0–5)
HGB BLD-MCNC: 12.8 G/DL (ref 11.5–16)
LDLC SERPL CALC-MCNC: 150.2 MG/DL (ref 0–100)
LIPID PROFILE,FLP: ABNORMAL
MCH RBC QN AUTO: 30.5 PG (ref 26–34)
MCHC RBC AUTO-ENTMCNC: 32.7 G/DL (ref 30–36.5)
MCV RBC AUTO: 93.1 FL (ref 80–99)
NRBC # BLD: 0 K/UL (ref 0–0.01)
NRBC BLD-RTO: 0 PER 100 WBC
PLATELET # BLD AUTO: 282 K/UL (ref 150–400)
PMV BLD AUTO: 9.7 FL (ref 8.9–12.9)
POTASSIUM SERPL-SCNC: 3.9 MMOL/L (ref 3.5–5.1)
PROT SERPL-MCNC: 7.4 G/DL (ref 6.4–8.2)
RBC # BLD AUTO: 4.2 M/UL (ref 3.8–5.2)
SODIUM SERPL-SCNC: 138 MMOL/L (ref 136–145)
TRIGL SERPL-MCNC: 164 MG/DL (ref ?–150)
VLDLC SERPL CALC-MCNC: 32.8 MG/DL
WBC # BLD AUTO: 5.8 K/UL (ref 3.6–11)

## 2020-04-30 RX ORDER — VALACYCLOVIR HYDROCHLORIDE 1 G/1
1000 TABLET, FILM COATED ORAL 3 TIMES DAILY
Qty: 21 TAB | Refills: 0 | Status: SHIPPED | OUTPATIENT
Start: 2020-04-30 | End: 2021-01-22 | Stop reason: ALTCHOICE

## 2020-04-30 RX ORDER — LOSARTAN POTASSIUM 50 MG/1
50 TABLET ORAL DAILY
Qty: 90 TAB | Refills: 0 | Status: SHIPPED | OUTPATIENT
Start: 2020-04-30 | End: 2021-01-22 | Stop reason: SDUPTHER

## 2020-04-30 NOTE — TELEPHONE ENCOUNTER
Left v/m for patient to return my call to schedule an appointment either virtually or in office, preferably in office so Dr. Camden Hemphill can take a look at the area.

## 2020-04-30 NOTE — PROGRESS NOTES
HISTORY OF PRESENT ILLNESS  Eduardo cMkinney is a 65 y.o. female. HPI  Rash: Pt reports that she has a clustered rash on her R abdomen. She thinks that it might be shingles. She notes that it looked slightly better this morning. Pt received the old shingles vaccine. Pt notes a personal hx of shingles. Pt denies burning sensation or Shingrex vaccine. Hypertension ROS: taking medications as instructed, no medication side effects noted, no TIA's, no chest pain on exertion, no dyspnea on exertion, no swelling of ankles. New concerns: BP in office today is 154/86. Continues on Losartan 25 mg and HCTZ 25 mg. She takes her BP occasionally but does not have her readings with her today. She is not very consistent with her medications since she forgets if she has taken them. Pt reports that she was swimming and doing water aerobics 3x a week but has not been doing that for 2 months. She does not like exercising at home but is scared to go to the pool. Hyperlipidemia:  Cardiovascular risk analysis - 65 y.o. female LDL goal is under 130. ROS: taking medications as instructed, no medication side effects noted, no TIA's, no chest pain on exertion, no dyspnea on exertion, no swelling of ankles. Tolerating meds, no myalgias or other side effects noted. New concerns: Pt's last LDL was 115 on 5/09/19. Continues on Simvastatin 40 mg. Pt reports that she is trying to get into her new routine since she retired but everything has been cancelled due to [place]. She is doing house and book projects to keep her busy. Review of Systems   Skin: Positive for rash. All other systems reviewed and are negative. Physical Exam  Constitutional:       Appearance: Normal appearance. HENT:      Right Ear: Hearing, tympanic membrane and external ear normal.      Left Ear: Hearing, tympanic membrane and external ear normal.      Mouth/Throat:      Mouth: Mucous membranes are moist.      Pharynx: Oropharynx is clear. Cardiovascular:      Rate and Rhythm: Normal rate and regular rhythm. Pulmonary:      Effort: Pulmonary effort is normal.      Breath sounds: Normal breath sounds and air entry. Musculoskeletal: Normal range of motion. Skin:     General: Skin is warm and dry. Comments: Clusters of small erythematous patches on only R side of abdomen - very mild shingles rash    Neurological:      General: No focal deficit present. Mental Status: She is alert and oriented to person, place, and time. Psychiatric:         Mood and Affect: Mood normal.         Behavior: Behavior normal.         ASSESSMENT and PLAN  Diagnoses and all orders for this visit:    1. Medicare annual wellness visit, subsequent    2. Essential hypertension  BP is elevated in office. Discussed with pt that she has not been at goal and she is on a low dose of Losartan. Advised pt to call back later today with her BP readings and if I need to increase her Losartan, I will. Will continue to monitor for improvements or changes. -     losartan (COZAAR) 50 mg tablet; Take 1 Tab by mouth daily.  -     CBC W/O DIFF; Future    3. Rash  Discussed with pt that she likely has shingles. Prescribed Valtrex. Informed pt that she should get the Shingrex vaccine after this. Discussed the Shingrex vaccine with pt. Informed pt that this new vaccine is replacing the old one with a 90% protective factor. Explained to pt that the 2-part vaccine can cause a flu-like illness for 24-48 hours. 4. Hypercholesteremia  Presumed stable, will recheck labs today. Patient is tolerating medications with no myalgias. I do not recommend any change in treatment plan. Informed pt that exercise is very important for her health. Explained that she needs to elevate her HR while doing either at home exercises or outdoors. -     LIPID PANEL; Future  -     METABOLIC PANEL, COMPREHENSIVE; Future    5.  Visit screening for mammogram  Ordered mammogram. Reminded pt that she is due for her mammogram. Waiting on scan results. Other orders  -     valACYclovir (VALTREX) 1 gram tablet; Take 1 Tab by mouth three (3) times daily. Lab results and schedule of future lab studies reviewed with patient. Reviewed diet, exercise and weight control. Written by Caio Hammond, as dictated by Fabiene Skiff, MD.     Current diagnosis and concerns discussed with pt at length. Understands risks and benefits or current treatment plan and medications and accepts the treatment and medication with any possible risks. Pt asks appropriate questions which were answered. Pt instructed to call with any concerns or problems.

## 2020-04-30 NOTE — PROGRESS NOTES
This is the Subsequent Medicare Annual Wellness Exam, performed 12 months or more after the Initial AWV or the last Subsequent AWV    I have reviewed the patient's medical history in detail and updated the computerized patient record. History     Patient Active Problem List   Diagnosis Code    Other ill-defined conditions(799.89) R69    Hypercholesteremia E78.00    Hepatitis K75.9    Medial meniscus tear S83.249A    OA (osteoarthritis) of knee M17.10    Hypercalcemia E83.52    Osteopenia M85.80    Hx of colonoscopy Z98.890    Constipation K59.00    Idiopathic hypercalciuria R82.994    Primary localized osteoarthritis of left hip M16.12    Primary osteoarthritis of both knees M17.0    Herpes zoster ophthalmicus B02.30    Nuclear nonsenile cataract H26.9     Past Medical History:   Diagnosis Date    Arthritis     L knee OA both knees per pt    History of shingles 09/2017    left eye- treated by opthamology with steroid drops    Hypercholesterolemia     Hypertension     Ill-defined condition     obese  BMI= 30.7 on 1/25/2017    Liver disease     H/O HEPATITIS A  contracted while working in [facility] Other ill-defined conditions(799.89) [address] [facility] 6CM TILT      Past Surgical History:   Procedure Laterality Date    COLONOSCOPY N/A 1/23/2018    COLONOSCOPY performed by Clary Salgado MD at [facility]. [address], COLON, DIAGNOSTIC  1995 & 2005 2015    HX APPENDECTOMY  1980s    HX HEENT      wisdom teeth extraction    HX KNEE ARTHROSCOPY Left 04/2012    A/S Meniscal Repair     HX KNEE ARTHROSCOPY Right 04/2011    A/S Meniscal Repair     HX ORTHOPAEDIC  1995    L BUNIONECTOMY    HX ORTHOPAEDIC Bilateral 06/2018    Total knee replacement    TOTAL HIP ARTHROPLASTY Left 02/2017     Current Outpatient Medications   Medication Sig Dispense Refill    losartan (COZAAR) 50 mg tablet Take 1 Tab by mouth daily.  90 Tab 0    valACYclovir (VALTREX) 1 gram tablet Take 1 Tab by mouth three (3) times daily. 21 Tab 0    simvastatin (ZOCOR) 40 mg tablet TAKE 1 TAB BY MOUTH NIGHTLY FOR 30 DAYS. 90 Tab 1    hydroCHLOROthiazide (HYDRODIURIL) 25 mg tablet TAKE 1 TABLET BY MOUTH EVERY DAY *DUE FOR FOLLOW UP APPOINTMENT* 90 Tab 0    aspirin delayed-release 81 mg tablet Take 81 mg by mouth daily.  multivitamin (ONE A DAY) tablet Take 1 Tab by mouth daily. No Known Allergies    Family History   Problem Relation Age of Onset    Heart Disease Mother     Hypertension Mother     Stroke Mother     Elevated Lipids Father     Liver Disease Father     Cancer Father         liver    Hypertension Sister     MS Sister     MS Sister     Heart Disease Maternal Grandmother     Stroke Maternal Grandfather      Social History     Tobacco Use    Smoking status: Never Smoker    Smokeless tobacco: Never Used   Substance Use Topics    Alcohol use: Yes     Alcohol/week: 10.0 - 14.0 standard drinks     Types: 3 - 7 Standard drinks or equivalent, 4 Glasses of wine, 3 Cans of beer per week     Comment: social glass of wine or beer nightly       Depression Risk Factor Screening:     3 most recent PHQ Screens 4/30/2020   Little interest or pleasure in doing things Not at all   Feeling down, depressed, irritable, or hopeless Not at all   Total Score PHQ 2 0       Alcohol Risk Factor Screening:   Do you average 1 drink per night or more than 7 drinks a week:  No    On any one occasion in the past three months have you have had more than 3 drinks containing alcohol:  No      Functional Ability and Level of Safety:   Hearing: Hearing is good. Activities of Daily Living: The home contains: no safety equipment. Patient does total self care    Ambulation: with no difficulty    Fall Risk:  Fall Risk Assessment, last 12 mths 5/9/2019   Able to walk? Yes   Fall in past 12 months? Yes   Fall with injury?  No   Number of falls in past 12 months 1   Fall Risk Score 1       Abuse Screen:  Patient is not abused    Cognitive Screening   Has your family/caregiver stated any concerns about your memory: no  Cognitive Screening: Normal - MMSE (Mini Mental Status Exam)    Patient Care Team   Patient Care Team:  Katharine Ruiz MD as PCP - General (Internal Medicine)  Katharine Ruiz MD as P[facility]    Assessment/Plan   Education and counseling provided:  Are appropriate based on today's review and evaluation    Diagnoses and all orders for this visit:    1. Rash    2. Essential hypertension  -     losartan (COZAAR) 50 mg tablet; Take 1 Tab by mouth daily.  -     CBC W/O DIFF; Future    3. Hypercholesteremia  -     LIPID PANEL; Future  -     METABOLIC PANEL, COMPREHENSIVE; Future    Other orders  -     valACYclovir (VALTREX) 1 gram tablet; Take 1 Tab by mouth three (3) times daily.         Health Maintenance Due   Topic Date Due    Shingrix Vaccine Age 49> (1 of 2) 01/18/1998    GLAUCOMA SCREENING Q2Y  08/17/2018    Breast Cancer Screen Mammogram  12/19/2019    Lipid Screen  05/09/2020

## 2020-04-30 NOTE — PATIENT INSTRUCTIONS
Medicare Wellness Visit, Female The best way to live healthy is to have a lifestyle where you eat a well-balanced diet, exercise regularly, limit alcohol use, and quit all forms of tobacco/nicotine, if applicable. Regular preventive services are another way to keep healthy. Preventive services (vaccines, screening tests, monitoring & exams) can help personalize your care plan, which helps you manage your own care. Screening tests can find health problems at the earliest stages, when they are easiest to treat. [facility] follows the current, evidence-based guidelines published by the [facility] Le ([facility]) when recommending preventive services for our patients. Because we follow these guidelines, sometimes recommendations change over time as research supports it. (For example, mammograms used to be recommended annually. Even though Medicare will still pay for an annual mammogram, the newer guidelines recommend a mammogram every two years for women of average risk). Of course, you and your doctor may decide to screen more often for some diseases, based on your risk and your co-morbidities (chronic disease you are already diagnosed with). Preventive services for you include: - Medicare offers their members a free annual wellness visit, which is time for you and your primary care provider to discuss and plan for your preventive service needs. Take advantage of this benefit every year! 
-All adults over the age of 72 should receive the recommended pneumonia vaccines. Current USPSTF guidelines recommend a series of two vaccines for the best pneumonia protection.  
-All adults should have a flu vaccine yearly and a tetanus vaccine every 10 years.  
-All adults age 48 and older should receive the shingles vaccines (series of two vaccines). -All adults age 38-68 who are overweight should have a diabetes screening test once every three years. -All adults born between [birth year] and 1965 should be screened once for Hepatitis C. 
-Other screening tests and preventive services for persons with diabetes include: an eye exam to screen for diabetic retinopathy, a kidney function test, a foot exam, and stricter control over your cholesterol.  
-Cardiovascular screening for adults with routine risk involves an electrocardiogram (ECG) at intervals determined by your doctor.  
-Colorectal cancer screenings should be done for adults age 54-65 with no increased risk factors for colorectal cancer. There are a number of acceptable methods of screening for this type of cancer. Each test has its own benefits and drawbacks. Discuss with your doctor what is most appropriate for you during your annual wellness visit. The different tests include: colonoscopy (considered the best screening method), a fecal occult blood test, a fecal DNA test, and sigmoidoscopy. 
 
-A bone mass density test is recommended when a woman turns 65 to screen for osteoporosis. This test is only recommended one time, as a screening. Some providers will use this same test as a disease monitoring tool if you already have osteoporosis. -Breast cancer screenings are recommended every other year for women of normal risk, age 54-69. 
-Cervical cancer screenings for women over age 72 are only recommended with certain risk factors. Here is a list of your current Health Maintenance items (your personalized list of preventive services) with a due date: 
Health Maintenance Due Topic Date Due  Shingles Vaccine (1 of 2) 01/18/1998  Glaucoma Screening   08/17/2018  Mammogram  12/19/2019  Cholesterol Test   05/09/2020

## 2020-05-01 ENCOUNTER — TELEPHONE (OUTPATIENT)
Dept: INTERNAL MEDICINE CLINIC | Age: 70
End: 2020-05-01

## 2020-05-01 NOTE — TELEPHONE ENCOUNTER
B/p readings:    3/11/20= 129/79  3/12/20= 147/85  3/13/20= 142/89  3/18/20= 132/69  3/31/20= 154/88  4/19/20= 133/79  4/22/20= 147/83

## 2020-05-02 NOTE — TELEPHONE ENCOUNTER
Discussed with patient her BP recordings- it did spike yesterday to 170/97 but then it was lower earlier- she was worried the valtrex was causing it.  Reassured her it was not and to start taking the losartan 25 mg two at a time as she was unable to get the 50 mg yet and then we can follow up on how it is

## 2020-05-04 NOTE — PROGRESS NOTES
Please let her know her lipids have jumped up from last time- what should we do? Start a low dose of cholesterol medicine ? How is her blood pressure with increased dose of losartan?

## 2020-05-05 ENCOUNTER — TELEPHONE (OUTPATIENT)
Dept: INTERNAL MEDICINE CLINIC | Age: 70
End: 2020-05-05

## 2020-05-05 NOTE — TELEPHONE ENCOUNTER
----- Message from Stef Santoro MD sent at 5/4/2020  4:19 PM EDT -----  Please let her know her lipids have jumped up from last time- what should we do? Start a low dose of cholesterol medicine ? How is her blood pressure with increased dose of losartan?

## 2020-05-05 NOTE — TELEPHONE ENCOUNTER
Spoke with patient and she states that she has been taking her Simvastatin 40mg, but she has not been exercising as much. She would prefer not to add anything else but if Dr. Nikki Wright really thinks it is necessary she will. She said that her BP has been lower and more stable on the increased dose of Losartan.

## 2020-05-05 NOTE — TELEPHONE ENCOUNTER
Great- let us give it 4-6  months and have her come in to see me and we can recheck things! Monitor Summary:  SB 51-73 with rare PVCs and occasionally drops as low as 39, nonsustaining. Had a 1.6sec pause this morning.  .20/.10/.42

## 2020-05-06 NOTE — TELEPHONE ENCOUNTER
Advised patient we will wait on new cholesterol medication and recheck in 4-6 months. In the meantime pt will work on diet and exercise.

## 2020-06-01 DIAGNOSIS — E78.00 HYPERCHOLESTEREMIA: ICD-10-CM

## 2020-06-01 RX ORDER — SIMVASTATIN 40 MG/1
TABLET, FILM COATED ORAL
Qty: 90 TAB | Refills: 1 | Status: SHIPPED | OUTPATIENT
Start: 2020-06-01 | End: 2020-12-11

## 2020-06-22 DIAGNOSIS — I10 ESSENTIAL HYPERTENSION: ICD-10-CM

## 2020-06-22 RX ORDER — HYDROCHLOROTHIAZIDE 25 MG/1
TABLET ORAL
Qty: 90 TAB | Refills: 0 | Status: SHIPPED | OUTPATIENT
Start: 2020-06-22 | End: 2020-09-19

## 2020-06-30 ENCOUNTER — HOSPITAL ENCOUNTER (EMERGENCY)
Age: 70
Discharge: STILL ADMITTED OR ADMINISTRATIVE | End: 2020-06-30
Attending: EMERGENCY MEDICINE

## 2020-09-19 DIAGNOSIS — I10 ESSENTIAL HYPERTENSION: ICD-10-CM

## 2020-09-19 RX ORDER — HYDROCHLOROTHIAZIDE 25 MG/1
TABLET ORAL
Qty: 90 TAB | Refills: 0 | Status: SHIPPED | OUTPATIENT
Start: 2020-09-19 | End: 2020-12-17

## 2020-12-11 ENCOUNTER — TELEPHONE (OUTPATIENT)
Dept: INTERNAL MEDICINE CLINIC | Age: 70
End: 2020-12-11

## 2020-12-11 DIAGNOSIS — E78.00 HYPERCHOLESTEREMIA: ICD-10-CM

## 2020-12-11 RX ORDER — SIMVASTATIN 40 MG/1
TABLET, FILM COATED ORAL
Qty: 90 TAB | Refills: 1 | Status: SHIPPED | OUTPATIENT
Start: 2020-12-11 | End: 2021-06-10

## 2020-12-11 RX ORDER — LOSARTAN POTASSIUM 50 MG/1
TABLET ORAL
Qty: 90 TAB | Refills: 0 | Status: SHIPPED | OUTPATIENT
Start: 2020-12-11 | End: 2021-03-05

## 2020-12-30 ENCOUNTER — TRANSCRIBE ORDER (OUTPATIENT)
Dept: SCHEDULING | Age: 70
End: 2020-12-30

## 2020-12-30 ENCOUNTER — HOSPITAL ENCOUNTER (OUTPATIENT)
Dept: MAMMOGRAPHY | Age: 70
Discharge: HOME | End: 2020-12-30
Attending: INTERNAL MEDICINE
Payer: MEDICARE

## 2020-12-30 ENCOUNTER — TELEPHONE (OUTPATIENT)
Dept: INTERNAL MEDICINE CLINIC | Age: 70
End: 2020-12-30

## 2020-12-30 DIAGNOSIS — Z12.31 VISIT FOR SCREENING MAMMOGRAM: ICD-10-CM

## 2020-12-30 DIAGNOSIS — Z12.31 VISIT FOR SCREENING MAMMOGRAM: Primary | ICD-10-CM

## 2020-12-30 PROCEDURE — 77063 BREAST TOMOSYNTHESIS BI: CPT

## 2020-12-30 NOTE — TELEPHONE ENCOUNTER
Spoke with patient and provided routine f/u appt with Dr. Pop Kumar 1/20/21 at 9:45am.  Pt understood and was thankful for the call.

## 2020-12-30 NOTE — TELEPHONE ENCOUNTER
Returning call from nurse or Dr. Rosalva Levi (cpuld not remember which one). She said she believed that she was called just to check in. Received call about a week ago.       [phone]

## 2021-01-08 DIAGNOSIS — I10 ESSENTIAL HYPERTENSION: ICD-10-CM

## 2021-01-08 RX ORDER — HYDROCHLOROTHIAZIDE 25 MG/1
TABLET ORAL
Qty: 30 TAB | Refills: 0 | Status: SHIPPED | OUTPATIENT
Start: 2021-01-08 | End: 2021-01-31

## 2021-01-22 ENCOUNTER — OFFICE VISIT (OUTPATIENT)
Dept: INTERNAL MEDICINE CLINIC | Age: 71
End: 2021-01-22
Payer: MEDICARE

## 2021-01-22 VITALS
SYSTOLIC BLOOD PRESSURE: 127 MMHG | WEIGHT: 177.6 LBS | HEART RATE: 65 BPM | RESPIRATION RATE: 16 BRPM | BODY MASS INDEX: 28.54 KG/M2 | DIASTOLIC BLOOD PRESSURE: 76 MMHG | OXYGEN SATURATION: 97 % | TEMPERATURE: 97.9 F | HEIGHT: 66 IN

## 2021-01-22 DIAGNOSIS — M85.80 OSTEOPENIA, UNSPECIFIED LOCATION: ICD-10-CM

## 2021-01-22 DIAGNOSIS — I10 ESSENTIAL HYPERTENSION: Primary | ICD-10-CM

## 2021-01-22 DIAGNOSIS — E78.00 HYPERCHOLESTEREMIA: ICD-10-CM

## 2021-01-22 PROCEDURE — 1090F PRES/ABSN URINE INCON ASSESS: CPT | Performed by: INTERNAL MEDICINE

## 2021-01-22 PROCEDURE — G8754 DIAS BP LESS 90: HCPCS | Performed by: INTERNAL MEDICINE

## 2021-01-22 PROCEDURE — G8419 CALC BMI OUT NRM PARAM NOF/U: HCPCS | Performed by: INTERNAL MEDICINE

## 2021-01-22 PROCEDURE — G9899 SCRN MAM PERF RSLTS DOC: HCPCS | Performed by: INTERNAL MEDICINE

## 2021-01-22 PROCEDURE — 99214 OFFICE O/P EST MOD 30 MIN: CPT | Performed by: INTERNAL MEDICINE

## 2021-01-22 PROCEDURE — G8427 DOCREV CUR MEDS BY ELIG CLIN: HCPCS | Performed by: INTERNAL MEDICINE

## 2021-01-22 PROCEDURE — G8510 SCR DEP NEG, NO PLAN REQD: HCPCS | Performed by: INTERNAL MEDICINE

## 2021-01-22 PROCEDURE — 3017F COLORECTAL CA SCREEN DOC REV: CPT | Performed by: INTERNAL MEDICINE

## 2021-01-22 PROCEDURE — G8752 SYS BP LESS 140: HCPCS | Performed by: INTERNAL MEDICINE

## 2021-01-22 PROCEDURE — 1101F PT FALLS ASSESS-DOCD LE1/YR: CPT | Performed by: INTERNAL MEDICINE

## 2021-01-22 PROCEDURE — G8536 NO DOC ELDER MAL SCRN: HCPCS | Performed by: INTERNAL MEDICINE

## 2021-01-22 PROCEDURE — G8399 PT W/DXA RESULTS DOCUMENT: HCPCS | Performed by: INTERNAL MEDICINE

## 2021-01-22 NOTE — PROGRESS NOTES
Shellie Hager (: 1950) is a 66 y.o. female, established  patient, here for evaluation of the following chief complaint(s):  Follow-up       ASSESSMENT/PLAN:  1. Essential hypertension  BP is at goal. I do not recommend any change in medications. Discussed with pt that I am happy that she is being intentional with her exercise. 2. Hypercholesteremia  Stable, patient is tolerating medications, no myalgias. I do not recommend any change in medications. 3. Osteopenia, unspecified location  Reminded pt that she is due for her DEXA. Waiting on scan results. Informed pt that her last DEXA showed some thinning of the bones which is common with age. Will continue to monitor for improvements or changes. -     DEXA BONE DENSITY STUDY AXIAL; Future    No follow-ups on file. SUBJECTIVE/OBJECTIVE:  HPI  Hypertension ROS: taking medications as instructed, no medication side effects noted, no TIA's, no chest pain on exertion, no dyspnea on exertion, no swelling of ankles. New concerns: BP in office today is 127/76. Pt reports that she is swimming 3x/week and walks her dog regularly. She notes that her BP is stable at home. Hyperlipidemia:  Cardiovascular risk analysis - 66 y.o. female LDL goal is under 130. ROS: taking medications as instructed, no medication side effects noted, no TIA's, no chest pain on exertion, no dyspnea on exertion, no swelling of ankles. Tolerating meds, no myalgias or other side effects noted. New concerns: Pt's last LDL was 150.2 on 20. Review of Systems   All other systems reviewed and are negative. Physical Exam  Constitutional:       Appearance: Normal appearance. HENT:      Right Ear: Tympanic membrane and external ear normal.      Left Ear: Tympanic membrane and external ear normal.      Mouth/Throat:      Mouth: Mucous membranes are moist.      Pharynx: Oropharynx is clear. Cardiovascular:      Rate and Rhythm: Normal rate and regular rhythm. Pulses: Normal pulses. Heart sounds: Normal heart sounds. Pulmonary:      Effort: Pulmonary effort is normal.      Breath sounds: Normal breath sounds. Musculoskeletal: Normal range of motion. Skin:     General: Skin is warm and dry. Neurological:      General: No focal deficit present. Mental Status: She is alert and oriented to person, place, and time. Psychiatric:         Mood and Affect: Mood normal.         Behavior: Behavior normal.               An electronic signature was used to authenticate this note.   -- David Stevenson

## 2021-01-23 LAB
ALBUMIN SERPL-MCNC: 4.6 G/DL (ref 3.5–5)
ALBUMIN/GLOB SERPL: 1.8 {RATIO} (ref 1.1–2.2)
ALP SERPL-CCNC: 68 U/L (ref 45–117)
ALT SERPL-CCNC: 36 U/L (ref 12–78)
ANION GAP SERPL CALC-SCNC: 3 MMOL/L (ref 5–15)
AST SERPL-CCNC: 29 U/L (ref 15–37)
BILIRUB SERPL-MCNC: 0.5 MG/DL (ref 0.2–1)
BUN SERPL-MCNC: 11 MG/DL (ref 6–20)
BUN/CREAT SERPL: 15 (ref 12–20)
CALCIUM SERPL-MCNC: 10.4 MG/DL (ref 8.5–10.1)
CHLORIDE SERPL-SCNC: 105 MMOL/L (ref 97–108)
CHOLEST SERPL-MCNC: 250 MG/DL
CO2 SERPL-SCNC: 29 MMOL/L (ref 21–32)
CREAT SERPL-MCNC: 0.74 MG/DL (ref 0.55–1.02)
ERYTHROCYTE [DISTWIDTH] IN BLOOD BY AUTOMATED COUNT: 13 % (ref 11.5–14.5)
GLOBULIN SER CALC-MCNC: 2.6 G/DL (ref 2–4)
GLUCOSE SERPL-MCNC: 90 MG/DL (ref 65–100)
HCT VFR BLD AUTO: 40.5 % (ref 35–47)
HDLC SERPL-MCNC: 85 MG/DL
HDLC SERPL: 2.9 {RATIO} (ref 0–5)
HGB BLD-MCNC: 13.3 G/DL (ref 11.5–16)
LDLC SERPL CALC-MCNC: 140 MG/DL (ref 0–100)
LIPID PROFILE,FLP: ABNORMAL
MCH RBC QN AUTO: 30.6 PG (ref 26–34)
MCHC RBC AUTO-ENTMCNC: 32.8 G/DL (ref 30–36.5)
MCV RBC AUTO: 93.1 FL (ref 80–99)
NRBC # BLD: 0 K/UL (ref 0–0.01)
NRBC BLD-RTO: 0 PER 100 WBC
PLATELET # BLD AUTO: 300 K/UL (ref 150–400)
PMV BLD AUTO: 9.5 FL (ref 8.9–12.9)
POTASSIUM SERPL-SCNC: 4.2 MMOL/L (ref 3.5–5.1)
PROT SERPL-MCNC: 7.2 G/DL (ref 6.4–8.2)
RBC # BLD AUTO: 4.35 M/UL (ref 3.8–5.2)
SODIUM SERPL-SCNC: 137 MMOL/L (ref 136–145)
TRIGL SERPL-MCNC: 125 MG/DL (ref ?–150)
VLDLC SERPL CALC-MCNC: 25 MG/DL
WBC # BLD AUTO: 6.1 K/UL (ref 3.6–11)

## 2021-02-28 ENCOUNTER — IMMUNIZATION (OUTPATIENT)
Dept: INTERNAL MEDICINE CLINIC | Age: 71
End: 2021-02-28
Payer: MEDICARE

## 2021-02-28 DIAGNOSIS — Z23 ENCOUNTER FOR IMMUNIZATION: Primary | ICD-10-CM

## 2021-02-28 PROCEDURE — 91300 COVID-19, MRNA, LNP-S, PF, 30MCG/0.3ML DOSE(PFIZER): CPT | Performed by: FAMILY MEDICINE

## 2021-02-28 PROCEDURE — 0001A COVID-19, MRNA, LNP-S, PF, 30MCG/0.3ML DOSE(PFIZER): CPT | Performed by: FAMILY MEDICINE

## 2021-03-21 ENCOUNTER — IMMUNIZATION (OUTPATIENT)
Dept: INTERNAL MEDICINE CLINIC | Age: 71
End: 2021-03-21
Payer: MEDICARE

## 2021-03-21 DIAGNOSIS — Z23 ENCOUNTER FOR IMMUNIZATION: Primary | ICD-10-CM

## 2021-03-21 PROCEDURE — 91300 COVID-19, MRNA, LNP-S, PF, 30MCG/0.3ML DOSE(PFIZER): CPT | Performed by: FAMILY MEDICINE

## 2021-03-21 PROCEDURE — 0002A COVID-19, MRNA, LNP-S, PF, 30MCG/0.3ML DOSE(PFIZER): CPT | Performed by: FAMILY MEDICINE

## 2021-08-08 DIAGNOSIS — I10 ESSENTIAL HYPERTENSION: ICD-10-CM

## 2021-08-08 RX ORDER — HYDROCHLOROTHIAZIDE 25 MG/1
TABLET ORAL
Qty: 90 TABLET | Refills: 1 | Status: SHIPPED | OUTPATIENT
Start: 2021-08-08 | End: 2022-10-03

## 2021-09-02 DIAGNOSIS — E78.00 HYPERCHOLESTEREMIA: ICD-10-CM

## 2021-09-02 RX ORDER — SIMVASTATIN 40 MG/1
TABLET, FILM COATED ORAL
Qty: 90 TABLET | Refills: 0 | Status: SHIPPED | OUTPATIENT
Start: 2021-09-02 | End: 2022-09-22 | Stop reason: ALTCHOICE

## 2021-09-27 ENCOUNTER — OFFICE VISIT (OUTPATIENT)
Dept: INTERNAL MEDICINE CLINIC | Age: 71
End: 2021-09-27
Payer: MEDICARE

## 2021-09-27 VITALS
RESPIRATION RATE: 16 BRPM | HEART RATE: 72 BPM | OXYGEN SATURATION: 99 % | WEIGHT: 182.4 LBS | HEIGHT: 66 IN | DIASTOLIC BLOOD PRESSURE: 74 MMHG | SYSTOLIC BLOOD PRESSURE: 132 MMHG | TEMPERATURE: 98 F | BODY MASS INDEX: 29.32 KG/M2

## 2021-09-27 DIAGNOSIS — Z00.00 MEDICARE ANNUAL WELLNESS VISIT, SUBSEQUENT: Primary | ICD-10-CM

## 2021-09-27 DIAGNOSIS — I10 ESSENTIAL HYPERTENSION: ICD-10-CM

## 2021-09-27 DIAGNOSIS — E78.00 HYPERCHOLESTEREMIA: ICD-10-CM

## 2021-09-27 DIAGNOSIS — Z78.0 POST-MENOPAUSAL: ICD-10-CM

## 2021-09-27 PROCEDURE — 99213 OFFICE O/P EST LOW 20 MIN: CPT | Performed by: INTERNAL MEDICINE

## 2021-09-27 PROCEDURE — G8536 NO DOC ELDER MAL SCRN: HCPCS | Performed by: INTERNAL MEDICINE

## 2021-09-27 PROCEDURE — 3017F COLORECTAL CA SCREEN DOC REV: CPT | Performed by: INTERNAL MEDICINE

## 2021-09-27 PROCEDURE — G8427 DOCREV CUR MEDS BY ELIG CLIN: HCPCS | Performed by: INTERNAL MEDICINE

## 2021-09-27 PROCEDURE — 1101F PT FALLS ASSESS-DOCD LE1/YR: CPT | Performed by: INTERNAL MEDICINE

## 2021-09-27 PROCEDURE — G8432 DEP SCR NOT DOC, RNG: HCPCS | Performed by: INTERNAL MEDICINE

## 2021-09-27 PROCEDURE — G8752 SYS BP LESS 140: HCPCS | Performed by: INTERNAL MEDICINE

## 2021-09-27 PROCEDURE — G0439 PPPS, SUBSEQ VISIT: HCPCS | Performed by: INTERNAL MEDICINE

## 2021-09-27 PROCEDURE — G8754 DIAS BP LESS 90: HCPCS | Performed by: INTERNAL MEDICINE

## 2021-09-27 PROCEDURE — G8419 CALC BMI OUT NRM PARAM NOF/U: HCPCS | Performed by: INTERNAL MEDICINE

## 2021-09-27 PROCEDURE — 1090F PRES/ABSN URINE INCON ASSESS: CPT | Performed by: INTERNAL MEDICINE

## 2021-09-27 PROCEDURE — G8399 PT W/DXA RESULTS DOCUMENT: HCPCS | Performed by: INTERNAL MEDICINE

## 2021-09-27 PROCEDURE — G9899 SCRN MAM PERF RSLTS DOC: HCPCS | Performed by: INTERNAL MEDICINE

## 2021-09-27 NOTE — PROGRESS NOTES
This is the Subsequent Medicare Annual Wellness Exam, performed 12 months or more after the Initial AWV or the last Subsequent AWV    I have reviewed the patient's medical history in detail and updated the computerized patient record. Assessment/Plan   Education and counseling provided:  Are appropriate based on today's review and evaluation    1. Essential hypertension  2. Hypercholesteremia       Depression Risk Factor Screening     3 most recent PHQ Screens 1/22/2021   Little interest or pleasure in doing things Not at all   Feeling down, depressed, irritable, or hopeless Not at all   Total Score PHQ 2 0       Alcohol Risk Screen    Do you average more than 1 drink per night or more than 7 drinks a week:  No    On any one occasion in the past three months have you have had more than 3 drinks containing alcohol:  No        Functional Ability and Level of Safety    Hearing: Hearing is good. Activities of Daily Living: The home contains: no safety equipment. Patient does total self care      Ambulation: with no difficulty     Fall Risk:  Fall Risk Assessment, last 12 mths 1/22/2021   Able to walk? Yes   Fall in past 12 months? 0   Do you feel unsteady? 0   Are you worried about falling 0   Number of falls in past 12 months -   Fall with injury?  -      Abuse Screen:  Patient is not abused       Cognitive Screening    Has your family/caregiver stated any concerns about your memory: no     Cognitive Screening: Normal - MMSE (Mini Mental Status Exam)    Health Maintenance Due     Health Maintenance Due   Topic Date Due    Shingrix Vaccine Age 49> (1 of 2) Never done    Medicare Yearly Exam  05/01/2021    Flu Vaccine (1) 09/01/2021       Patient Care Team   Patient Care Team:  Sebastian Matthew MD as PCP - General (Internal Medicine)  Sebastian Matthew MD [facility] Empaneled Provider    History     Patient Active Problem List   Diagnosis Code    Other ill-defined conditions(799.89) R6    Hypercholesteremia E78.00    Hepatitis K75.9    Medial meniscus tear S83.249A    OA (osteoarthritis) of knee M17.10    Hypercalcemia E83.52    Osteopenia M85.80    Hx of colonoscopy Z98.890    Constipation K59.00    Idiopathic hypercalciuria R82.994    Primary localized osteoarthritis of left hip M16.12    Primary osteoarthritis of both knees M17.0    Herpes zoster ophthalmicus B02.30    Nuclear nonsenile cataract H26.9     Past Medical History:   Diagnosis Date    Arthritis     L knee OA both knees per pt    History of shingles 09/2017    left eye- treated by opthamology with steroid drops    Hypercholesterolemia     Hypertension     Ill-defined condition     obese  BMI= 30.7 on 1/25/2017    Liver disease     H/O HEPATITIS A  contracted while working in [facility] Other ill-defined conditions(799.89) [address] [facility] 6CM TILT      Past Surgical History:   Procedure Laterality Date    COLONOSCOPY N/A 1/23/2018    COLONOSCOPY performed by [facility]co MD at [facility]. [address], COLON, DIAGNOSTIC  1995 & 2005 2015    HX APPENDECTOMY  1980s    HX HEENT      wisdom teeth extraction    HX KNEE ARTHROSCOPY Left 04/2012    A/S Meniscal Repair     HX KNEE ARTHROSCOPY Right 04/2011    A/S Meniscal Repair     HX ORTHOPAEDIC  1995    L BUNIONECTOMY    HX ORTHOPAEDIC Bilateral 06/2018    Total knee replacement    UT TOTAL HIP ARTHROPLASTY Left 02/2017     Current Outpatient Medications   Medication Sig Dispense Refill    losartan (COZAAR) 50 mg tablet TAKE 1 TABLET BY MOUTH EVERY DAY 30 Tablet 0    simvastatin (ZOCOR) 40 mg tablet TAKE 1 TAB BY MOUTH NIGHTLY 90 Tablet 0    hydroCHLOROthiazide (HYDRODIURIL) 25 mg tablet TAKE 1 TABLET BY MOUTH EVERY DAY *DUE FOR FOLLOW UP APPT* 90 Tablet 1    OTHER Juice plus      aspirin delayed-release 81 mg tablet Take 81 mg by mouth daily.  multivitamin (ONE A DAY) tablet Take 1 Tab by mouth daily.        No Known Allergies    Family History   Problem Relation Age of Onset    Heart Disease Mother     Hypertension Mother     Stroke Mother     Elevated Lipids Father     Liver Disease Father     Cancer Father         liver    Hypertension Sister     MS Sister     MS Sister     Heart Disease Maternal Grandmother     Stroke Maternal Grandfather      Social History     Tobacco Use    Smoking status: Never Smoker    Smokeless tobacco: Never Used   Substance Use Topics    Alcohol use:  Yes     Alcohol/week: 10.0 - 14.0 standard drinks     Types: 3 - 7 Standard drinks or equivalent, 4 Glasses of wine, 3 Cans of beer per week     Comment: social glass of wine or beer nightly         Rolf Rajput MD

## 2021-09-27 NOTE — PATIENT INSTRUCTIONS

## 2021-09-27 NOTE — PROGRESS NOTES
Breanne Giles (: 1950) is a 66 y.o. female, established patient, here for evaluation of the following chief complaint(s):  Pre-op Exam (cataract)       ASSESSMENT/PLAN:  Below is the assessment and plan developed based on review of pertinent history, physical exam, labs, studies, and medications. 1. Medicare annual wellness visit, subsequent  2. Essential hypertension  -     METABOLIC PANEL, COMPREHENSIVE; Future  BP is at goal. I do not recommend any change in medications. Continue with ongoing regimen of Losartan and HCTZ. 3. Hypercholesteremia  -     LIPID PANEL; Future  Presumed stable, will recheck labs today. Patient is tolerating medications with no myalgias. I do not recommend any change in treatment plan. Continue with ongoing regimen of Simvastatin. 4. Post-menopausal  -     DEXA BONE DENSITY STUDY AXIAL; Future  I will order a routine bone density scan. No follow-ups on file. SUBJECTIVE/OBJECTIVE:  HPI    Pre-operative Evaluation: Pt presents today for pre-operative clearance for cataract surgery with Dr. Brandon Mancuso on 10/12 and 10/21. She denies hx of latex allergy or complications of anesthesia. Pt states that she is not experiencing sinus sx, CP, SOB, or changes in her BM patterns. Hypertension ROS: taking medications as instructed, no medication side effects noted, no TIA's, no chest pain on exertion, no dyspnea on exertion, no swelling of ankles. BP in office today is 132/74. She states that her pressure at home is generally 140/70. Pt intends to message the office with some examples of at home readings. Other: Pt is exercising in water ~45 minutes about 3x/week. She walks with her dog, who is elderly, multiple times per day. Pt lost the order for her DEXA scan and is requesting another. She is planning to spend the holidays in [facility]. Review of Systems   All other systems reviewed and are negative. Physical Exam  Constitutional:       Appearance: Normal appearance. HENT:      Right Ear: Tympanic membrane and external ear normal.      Left Ear: Tympanic membrane and external ear normal.      Mouth/Throat:      Mouth: Mucous membranes are moist.      Pharynx: Oropharynx is clear. Cardiovascular:      Rate and Rhythm: Normal rate and regular rhythm. Pulses: Normal pulses. Heart sounds: Normal heart sounds. Pulmonary:      Effort: Pulmonary effort is normal.      Breath sounds: Normal breath sounds. Musculoskeletal:         General: Normal range of motion. Skin:     General: Skin is warm and dry. Neurological:      General: No focal deficit present. Mental Status: She is alert and oriented to person, place, and time. Psychiatric:         Mood and Affect: Mood normal.         Behavior: Behavior normal.     On this date 09/27/2021 I have spent 30 minutes reviewing previous notes, test results and face to face with the patient discussing the diagnosis and importance of compliance with the treatment plan as well as documenting on the day of the visit. An electronic signature was used to authenticate this note. Written by Veatrice Goldmann as dictated by Dr. Nona Peralta.    -- Veatrice Goldmann

## 2021-10-05 ENCOUNTER — HOSPITAL ENCOUNTER (OUTPATIENT)
Dept: MAMMOGRAPHY | Age: 71
Discharge: HOME | End: 2021-10-05
Attending: INTERNAL MEDICINE
Payer: MEDICARE

## 2021-10-05 DIAGNOSIS — Z78.0 POST-MENOPAUSAL: ICD-10-CM

## 2021-10-05 PROCEDURE — 77080 DXA BONE DENSITY AXIAL: CPT

## 2021-10-07 LAB
ALBUMIN SERPL-MCNC: 4.8 G/DL (ref 3.7–4.7)
ALBUMIN/GLOB SERPL: 2.3 {RATIO} (ref 1.2–2.2)
ALP SERPL-CCNC: 67 IU/L (ref 44–121)
ALT SERPL-CCNC: 24 IU/L (ref 0–32)
AST SERPL-CCNC: 28 IU/L (ref 0–40)
BILIRUB SERPL-MCNC: 0.6 MG/DL (ref 0–1.2)
BUN SERPL-MCNC: 10 MG/DL (ref 8–27)
BUN/CREAT SERPL: 13 (ref 12–28)
CALCIUM SERPL-MCNC: 10.2 MG/DL (ref 8.7–10.3)
CHLORIDE SERPL-SCNC: 99 MMOL/L (ref 96–106)
CHOLEST SERPL-MCNC: 246 MG/DL (ref 100–199)
CO2 SERPL-SCNC: 26 MMOL/L (ref 20–29)
CREAT SERPL-MCNC: 0.76 MG/DL (ref 0.57–1)
GLOBULIN SER CALC-MCNC: 2.1 G/DL (ref 1.5–4.5)
GLUCOSE SERPL-MCNC: 83 MG/DL (ref 65–99)
HDLC SERPL-MCNC: 80 MG/DL
IMP & REVIEW OF LAB RESULTS: NORMAL
LDLC SERPL CALC-MCNC: 145 MG/DL (ref 0–99)
POTASSIUM SERPL-SCNC: 4.1 MMOL/L (ref 3.5–5.2)
PROT SERPL-MCNC: 6.9 G/DL (ref 6–8.5)
SODIUM SERPL-SCNC: 137 MMOL/L (ref 134–144)
TRIGL SERPL-MCNC: 124 MG/DL (ref 0–149)
VLDLC SERPL CALC-MCNC: 21 MG/DL (ref 5–40)

## 2021-10-11 RX ORDER — ROSUVASTATIN CALCIUM 20 MG/1
20 TABLET, COATED ORAL
Qty: 90 TABLET | Refills: 1 | Status: SHIPPED | OUTPATIENT
Start: 2021-10-11 | End: 2022-04-07

## 2022-01-28 ENCOUNTER — TRANSCRIBE ORDER (OUTPATIENT)
Dept: SCHEDULING | Age: 72
End: 2022-01-28

## 2022-01-28 DIAGNOSIS — Z12.31 SCREENING MAMMOGRAM FOR HIGH-RISK PATIENT: Primary | ICD-10-CM

## 2022-02-14 ENCOUNTER — HOSPITAL ENCOUNTER (OUTPATIENT)
Dept: MAMMOGRAPHY | Age: 72
Discharge: HOME | End: 2022-02-14
Attending: INTERNAL MEDICINE
Payer: MEDICARE

## 2022-02-14 DIAGNOSIS — Z12.31 SCREENING MAMMOGRAM FOR HIGH-RISK PATIENT: ICD-10-CM

## 2022-02-14 PROCEDURE — 77067 SCR MAMMO BI INCL CAD: CPT

## 2022-03-18 PROBLEM — M17.0 PRIMARY OSTEOARTHRITIS OF BOTH KNEES: Status: ACTIVE | Noted: 2018-06-18

## 2022-03-19 PROBLEM — B02.30 HERPES ZOSTER OPHTHALMICUS: Status: ACTIVE | Noted: 2017-09-11

## 2022-03-19 PROBLEM — M16.12 PRIMARY LOCALIZED OSTEOARTHRITIS OF LEFT HIP: Status: ACTIVE | Noted: 2017-02-06

## 2022-04-07 RX ORDER — LOSARTAN POTASSIUM 50 MG/1
TABLET ORAL
Qty: 30 TABLET | Refills: 0 | Status: SHIPPED | OUTPATIENT
Start: 2022-04-07 | End: 2022-05-03

## 2022-04-07 RX ORDER — ROSUVASTATIN CALCIUM 20 MG/1
TABLET, COATED ORAL
Qty: 30 TABLET | Refills: 0 | Status: SHIPPED | OUTPATIENT
Start: 2022-04-07 | End: 2022-05-03

## 2022-05-03 RX ORDER — ROSUVASTATIN CALCIUM 20 MG/1
TABLET, COATED ORAL
Qty: 30 TABLET | Refills: 0 | Status: SHIPPED | OUTPATIENT
Start: 2022-05-03 | End: 2022-05-14

## 2022-05-03 RX ORDER — LOSARTAN POTASSIUM 50 MG/1
TABLET ORAL
Qty: 30 TABLET | Refills: 0 | Status: SHIPPED | OUTPATIENT
Start: 2022-05-03 | End: 2022-05-14

## 2022-05-14 ENCOUNTER — TELEPHONE (OUTPATIENT)
Dept: INTERNAL MEDICINE CLINIC | Age: 72
End: 2022-05-14

## 2022-05-14 NOTE — TELEPHONE ENCOUNTER
Recent onset of uri sxs and test she did today did show covid positive-interested in antiviral therapy-aware to hold the crestor while on paxlovid and I did call the paxlovid to [phone]   Will continue to quarantine and treat sxs supportively as well

## 2022-05-14 NOTE — TELEPHONE ENCOUNTER
Yesterday chills and sore throat started, no fevers, started cold ease and advil,  Today feels tired and temp is 97.4  Now feels worse in chest-has not done a test  Discussed likely covid based on sxs  Advised get tested so that she knows for sure and is a candidate for antivirals if she wishes

## 2022-05-16 NOTE — TELEPHONE ENCOUNTER
Spoke with patient and she states that she started paxlovid Saturday evening. She states that her throat is sore and feels tight. She is doing warm salt water gargles and drinking warm tea. Patient does not feel she needs an appointment at this time. Advised her of what to look for if she may be developing a secondary infection. Pt understood and was thankful for the call.

## 2022-08-31 RX ORDER — LOSARTAN POTASSIUM 50 MG/1
TABLET ORAL
Qty: 30 TABLET | Refills: 0 | Status: SHIPPED | OUTPATIENT
Start: 2022-08-31 | End: 2022-09-23

## 2022-09-20 NOTE — PATIENT INSTRUCTIONS
Medicare Wellness Visit, Female     The best way to live healthy is to have a lifestyle where you eat a well-balanced diet, exercise regularly, limit alcohol use, and quit all forms of tobacco/nicotine, if applicable. Regular preventive services are another way to keep healthy. Preventive services (vaccines, screening tests, monitoring & exams) can help personalize your care plan, which helps you manage your own care. Screening tests can find health problems at the earliest stages, when they are easiest to treat. [facility] follows the current, evidence-based guidelines published by the [facility] Le ([facility]) when recommending preventive services for our patients. Because we follow these guidelines, sometimes recommendations change over time as research supports it. (For example, mammograms used to be recommended annually. Even though Medicare will still pay for an annual mammogram, the newer guidelines recommend a mammogram every two years for women of average risk). Of course, you and your doctor may decide to screen more often for some diseases, based on your risk and your co-morbidities (chronic disease you are already diagnosed with). Preventive services for you include:  - Medicare offers their members a free annual wellness visit, which is time for you and your primary care provider to discuss and plan for your preventive service needs. Take advantage of this benefit every year!  -All adults over the age of 72 should receive the recommended pneumonia vaccines. Current USPSTF guidelines recommend a series of two vaccines for the best pneumonia protection.   -All adults should have a flu vaccine yearly and a tetanus vaccine every 10 years.   -All adults age 48 and older should receive the shingles vaccines (series of two vaccines).       -All adults age 38-68 who are overweight should have a diabetes screening test once every three years.   -All adults born between [birth year] and 1965 should be screened once for Hepatitis C.  -Other screening tests and preventive services for persons with diabetes include: an eye exam to screen for diabetic retinopathy, a kidney function test, a foot exam, and stricter control over your cholesterol.   -Cardiovascular screening for adults with routine risk involves an electrocardiogram (ECG) at intervals determined by your doctor.   -Colorectal cancer screenings should be done for adults age 54-65 with no increased risk factors for colorectal cancer. There are a number of acceptable methods of screening for this type of cancer. Each test has its own benefits and drawbacks. Discuss with your doctor what is most appropriate for you during your annual wellness visit. The different tests include: colonoscopy (considered the best screening method), a fecal occult blood test, a fecal DNA test, and sigmoidoscopy.    -A bone mass density test is recommended when a woman turns 65 to screen for osteoporosis. This test is only recommended one time, as a screening. Some providers will use this same test as a disease monitoring tool if you already have osteoporosis. -Breast cancer screenings are recommended every other year for women of normal risk, age 54-69.  -Cervical cancer screenings for women over age 72 are only recommended with certain risk factors.      Here is a list of your current Health Maintenance items (your personalized list of preventive services) with a due date:  Health Maintenance Due   Topic Date Due    Shingles Vaccine (1 of 2) Never done    Depresssion Screening  01/22/2022    Yearly Flu Vaccine (1) 09/01/2022    Annual Well Visit  09/28/2022

## 2022-09-20 NOTE — PROGRESS NOTES
Yuval Corcoran (: 1950) is a 74 y.o. female, established patient, here for evaluation of the following chief complaint(s):  Physical       ASSESSMENT/PLAN:  Below is the assessment and plan developed based on review of pertinent history, physical exam, labs, studies, and medications. 1. Medicare annual wellness visit, subsequent  2. Essential hypertension- at goal on current dose of hctz 25 mg and losartan 50 mg once a day   -     CBC W/O DIFF; Future  3. Hypercholesteremia-cont with crestor 20 mg once a day but if lipids come back high may want to think about adding zetia?   -     METABOLIC PANEL, COMPREHENSIVE; Future  -     LIPID PANEL; Future    No follow-ups on file. SUBJECTIVE/OBJECTIVE:  HPI  Subjective:   Yuval Corcoran is a 74 y.o. female with hypertension. Hypertension ROS: taking medications as instructed, no medication side effects noted, no TIA's, no chest pain on exertion, no dyspnea on exertion, no swelling of ankles. New concerns: 123/77. She has been exercising and still does swim 2-3 times a week for 45 min- to an hour and moderate swimming and walks and just came back from beach vacation   Subjective:   Yuval Corcoran is a 74 y.o. female with hyperlipidemia. Cardiovascular risk analysis - 74 y.o. female LDL goal is under 100. ROS: taking medications as instructed, no medication side effects noted, no TIA's, no chest pain on exertion, no dyspnea on exertion, no swelling of ankles. Tolerating meds, no myalgias or other side effects noted  New concerns: tolerating medicine- last lipids were a year ago.  Not at goal      Lab Results   Component Value Date/Time    Cholesterol, total 246 (H) 10/06/2021 10:20 AM    HDL Cholesterol 80 10/06/2021 10:20 AM    LDL, calculated 145 (H) 10/06/2021 10:20 AM    LDL, calculated 140 (H) 2021 11:42 AM    VLDL, calculated 21 10/06/2021 10:20 AM    VLDL, calculated 25 2021 11:42 AM    Triglyceride 124 10/06/2021 10:20 AM CHOL/HDL Ratio 2.9 01/22/2021 11:42 AM     She does have hearing aids and was able to get $400 each one through [employer]     Review of Systems   All other systems reviewed and are negative. Physical Exam  Vitals and nursing note reviewed. Constitutional:       Appearance: Normal appearance. She is well-developed. HENT:      Head: Normocephalic and atraumatic. Right Ear: External ear normal.      Left Ear: External ear normal.      Nose: Nose normal.   Eyes:      Conjunctiva/sclera: Conjunctivae normal.      Pupils: Pupils are equal, round, and reactive to light. Neck:      Thyroid: No thyromegaly. Vascular: No carotid bruit. Cardiovascular:      Rate and Rhythm: Normal rate and regular rhythm. Heart sounds: Normal heart sounds, S1 normal and S2 normal.   Pulmonary:      Effort: Pulmonary effort is normal.      Breath sounds: Normal breath sounds. Abdominal:      General: Bowel sounds are normal.      Palpations: Abdomen is soft. Tenderness: There is no abdominal tenderness. Musculoskeletal:         General: Normal range of motion. Cervical back: Normal range of motion and neck supple. Skin:     General: Skin is warm and dry. Neurological:      Mental Status: She is alert and oriented to person, place, and time. Psychiatric:         Behavior: Behavior normal.         Thought Content: Thought content normal.         Judgment: Judgment normal.       On this date 09/22/2022 I have spent 30  minutes  outside wellness reviewing previous notes, test results and face to face with the patient discussing the diagnosis and importance of compliance with the treatment plan as well as documenting on the day of the visit. An electronic signature was used to authenticate this note.   -- Kennis Mcardle, MD   This is the Subsequent Medicare Annual Wellness Exam, performed 12 months or more after the Initial AWV or the last Subsequent AWV    I have reviewed the patient's medical history in detail and updated the computerized patient record. Assessment/Plan   Education and counseling provided:  Are appropriate based on today's review and evaluation    1. Medicare annual wellness visit, subsequent  2. Essential hypertension  -     CBC W/O DIFF; Future  3. Hypercholesteremia  -     METABOLIC PANEL, COMPREHENSIVE; Future  -     LIPID PANEL; Future       Depression Risk Factor Screening     3 most recent PHQ Screens 9/22/2022   Little interest or pleasure in doing things Not at all   Feeling down, depressed, irritable, or hopeless Not at all   Total Score PHQ 2 0       Alcohol & Drug Abuse Risk Screen    Do you average more than 1 drink per night or more than 7 drinks a week:  No    On any one occasion in the past three months have you have had more than 3 drinks containing alcohol:  No          Functional Ability and Level of Safety    Hearing: Hearing is good. Activities of Daily Living: The home contains: no safety equipment. Patient does total self care      Ambulation: with no difficulty     Fall Risk:  Fall Risk Assessment, last 12 mths 9/22/2022   Able to walk? Yes   Fall in past 12 months? 0   Do you feel unsteady? 0   Are you worried about falling 0   Number of falls in past 12 months -   Fall with injury?  -      Abuse Screen:  Patient is not abused       Cognitive Screening    Has your family/caregiver stated any concerns about your memory: no     Cognitive Screening: Normal - MMSE (Mini Mental Status Exam)    Health Maintenance Due     Health Maintenance Due   Topic Date Due    Shingrix Vaccine Age 49> (1 of 2) Never done    Medicare Yearly Exam  09/28/2022    Lipid Screen  10/06/2022       Patient Care Team   Patient Care Team:  Gracie Ruiz MD as PCP - General (Internal Medicine Physician)  Gracie Ruiz MD as PCP [facility] Empaneled Provider    History     Patient Active Problem List   Diagnosis Code    Other ill-defined conditions(799.89) W29 Hypercholesteremia E78.00    Hepatitis K75.9    Medial meniscus tear S83.249A    OA (osteoarthritis) of knee M17.10    Hypercalcemia E83.52    Osteopenia M85.80    Hx of colonoscopy Z98.890    Constipation K59.00    Idiopathic hypercalciuria R82.994    Primary localized osteoarthritis of left hip M16.12    Primary osteoarthritis of both knees M17.0    Herpes zoster ophthalmicus B02.30    Nuclear nonsenile cataract H26.9     Past Medical History:   Diagnosis Date    Arthritis     L knee OA both knees per pt    History of shingles 09/2017    left eye- treated by opthamology with steroid drops    Hypercholesterolemia     Hypertension     Ill-defined condition     obese  BMI= 30.7 on 1/25/2017    Liver disease     H/O HEPATITIS A  contracted while working in [place]    Other ill-defined conditions(799.89) [address] AS [facility] 6CM TILT      Past Surgical History:   Procedure Laterality Date    COLONOSCOPY N/A 1/23/2018    COLONOSCOPY performed by Remedios Malin MD at [address] [place] [address], DIAGNOSTIC  1995 & 2005 2015    HX APPENDECTOMY  1980s    HX HEENT      wisdom teeth extraction    HX KNEE ARTHROSCOPY Left 04/2012    A/S Meniscal Repair     HX KNEE ARTHROSCOPY Right 04/2011    A/S Meniscal Repair     HX ORTHOPAEDIC  1995    L BUNIONECTOMY    HX ORTHOPAEDIC Bilateral 06/2018    Total knee replacement    [place] TOTAL HIP ARTHROPLASTY Left 02/2017     Current Outpatient Medications   Medication Sig Dispense Refill    losartan (COZAAR) 50 mg tablet TAKE 1 TABLET BY MOUTH EVERY DAY 30 Tablet 0    rosuvastatin (CRESTOR) 20 mg tablet TAKE 1 TABLET BY MOUTH EVERY DAY AT NIGHT 30 Tablet 0    hydroCHLOROthiazide (HYDRODIURIL) 25 mg tablet TAKE 1 TABLET BY MOUTH EVERY DAY *DUE FOR FOLLOW UP APPT* 90 Tablet 1    OTHER Juice plus      aspirin delayed-release 81 mg tablet Take 81 mg by mouth daily. multivitamin (ONE A DAY) tablet Take 1 Tab by mouth daily.  (Patient not taking: Reported on 9/22/2022)       No Known Allergies    Family History   Problem Relation Age of Onset    Heart Disease Mother     Hypertension Mother     Stroke Mother     Elevated Lipids Father     Liver Disease Father     Cancer Father         liver    Hypertension Sister     Mult Sclerosis Sister     Mult Sclerosis Sister     Heart Disease Maternal Grandmother     Stroke Maternal Grandfather      Social History     Tobacco Use    Smoking status: Never    Smokeless tobacco: Never   Substance Use Topics    Alcohol use:  Yes     Alcohol/week: 10.0 - 14.0 standard drinks     Types: 3 - 7 Standard drinks or equivalent, 4 Glasses of wine, 3 Cans of beer per week     Comment: social glass of wine or beer nightly         Lora Awad MD

## 2022-09-22 ENCOUNTER — OFFICE VISIT (OUTPATIENT)
Dept: INTERNAL MEDICINE CLINIC | Age: 72
End: 2022-09-22
Payer: MEDICARE

## 2022-09-22 VITALS
HEART RATE: 62 BPM | SYSTOLIC BLOOD PRESSURE: 123 MMHG | HEIGHT: 66 IN | DIASTOLIC BLOOD PRESSURE: 77 MMHG | RESPIRATION RATE: 14 BRPM | OXYGEN SATURATION: 96 % | BODY MASS INDEX: 30.05 KG/M2 | WEIGHT: 187 LBS | TEMPERATURE: 97.9 F

## 2022-09-22 DIAGNOSIS — Z00.00 MEDICARE ANNUAL WELLNESS VISIT, SUBSEQUENT: Primary | ICD-10-CM

## 2022-09-22 DIAGNOSIS — I10 ESSENTIAL HYPERTENSION: ICD-10-CM

## 2022-09-22 DIAGNOSIS — E78.00 HYPERCHOLESTEREMIA: ICD-10-CM

## 2022-09-22 PROCEDURE — 3017F COLORECTAL CA SCREEN DOC REV: CPT | Performed by: INTERNAL MEDICINE

## 2022-09-22 PROCEDURE — G8427 DOCREV CUR MEDS BY ELIG CLIN: HCPCS | Performed by: INTERNAL MEDICINE

## 2022-09-22 PROCEDURE — G8536 NO DOC ELDER MAL SCRN: HCPCS | Performed by: INTERNAL MEDICINE

## 2022-09-22 PROCEDURE — G8399 PT W/DXA RESULTS DOCUMENT: HCPCS | Performed by: INTERNAL MEDICINE

## 2022-09-22 PROCEDURE — G9899 SCRN MAM PERF RSLTS DOC: HCPCS | Performed by: INTERNAL MEDICINE

## 2022-09-22 PROCEDURE — 1101F PT FALLS ASSESS-DOCD LE1/YR: CPT | Performed by: INTERNAL MEDICINE

## 2022-09-22 PROCEDURE — G8417 CALC BMI ABV UP PARAM F/U: HCPCS | Performed by: INTERNAL MEDICINE

## 2022-09-22 PROCEDURE — 1090F PRES/ABSN URINE INCON ASSESS: CPT | Performed by: INTERNAL MEDICINE

## 2022-09-22 PROCEDURE — 99213 OFFICE O/P EST LOW 20 MIN: CPT | Performed by: INTERNAL MEDICINE

## 2022-09-22 PROCEDURE — G0439 PPPS, SUBSEQ VISIT: HCPCS | Performed by: INTERNAL MEDICINE

## 2022-09-22 PROCEDURE — G8510 SCR DEP NEG, NO PLAN REQD: HCPCS | Performed by: INTERNAL MEDICINE

## 2022-09-23 LAB
ALBUMIN SERPL-MCNC: 4.1 G/DL (ref 3.5–5)
ALBUMIN/GLOB SERPL: 1.3 {RATIO} (ref 1.1–2.2)
ALP SERPL-CCNC: 65 U/L (ref 45–117)
ALT SERPL-CCNC: 30 U/L (ref 12–78)
ANION GAP SERPL CALC-SCNC: 6 MMOL/L (ref 5–15)
AST SERPL-CCNC: 24 U/L (ref 15–37)
BILIRUB SERPL-MCNC: 0.4 MG/DL (ref 0.2–1)
BUN SERPL-MCNC: 15 MG/DL (ref 6–20)
BUN/CREAT SERPL: 19 (ref 12–20)
CALCIUM SERPL-MCNC: 10.3 MG/DL (ref 8.5–10.1)
CHLORIDE SERPL-SCNC: 102 MMOL/L (ref 97–108)
CHOLEST SERPL-MCNC: 174 MG/DL
CO2 SERPL-SCNC: 29 MMOL/L (ref 21–32)
CREAT SERPL-MCNC: 0.79 MG/DL (ref 0.55–1.02)
ERYTHROCYTE [DISTWIDTH] IN BLOOD BY AUTOMATED COUNT: 12.8 % (ref 11.5–14.5)
GLOBULIN SER CALC-MCNC: 3.1 G/DL (ref 2–4)
GLUCOSE SERPL-MCNC: 88 MG/DL (ref 65–100)
HCT VFR BLD AUTO: 38.8 % (ref 35–47)
HDLC SERPL-MCNC: 67 MG/DL
HDLC SERPL: 2.6 {RATIO} (ref 0–5)
HGB BLD-MCNC: 12.6 G/DL (ref 11.5–16)
LDLC SERPL CALC-MCNC: 83 MG/DL (ref 0–100)
MCH RBC QN AUTO: 30.3 PG (ref 26–34)
MCHC RBC AUTO-ENTMCNC: 32.5 G/DL (ref 30–36.5)
MCV RBC AUTO: 93.3 FL (ref 80–99)
NRBC # BLD: 0 K/UL (ref 0–0.01)
NRBC BLD-RTO: 0 PER 100 WBC
PLATELET # BLD AUTO: 282 K/UL (ref 150–400)
PMV BLD AUTO: 10.1 FL (ref 8.9–12.9)
POTASSIUM SERPL-SCNC: 3.9 MMOL/L (ref 3.5–5.1)
PROT SERPL-MCNC: 7.2 G/DL (ref 6.4–8.2)
RBC # BLD AUTO: 4.16 M/UL (ref 3.8–5.2)
SODIUM SERPL-SCNC: 137 MMOL/L (ref 136–145)
TRIGL SERPL-MCNC: 120 MG/DL (ref ?–150)
VLDLC SERPL CALC-MCNC: 24 MG/DL
WBC # BLD AUTO: 6.5 K/UL (ref 3.6–11)

## 2022-09-23 RX ORDER — LOSARTAN POTASSIUM 50 MG/1
TABLET ORAL
Qty: 30 TABLET | Refills: 0 | Status: SHIPPED | OUTPATIENT
Start: 2022-09-23 | End: 2022-10-18

## 2022-09-23 RX ORDER — ROSUVASTATIN CALCIUM 20 MG/1
TABLET, COATED ORAL
Qty: 30 TABLET | Refills: 0 | Status: SHIPPED | OUTPATIENT
Start: 2022-09-23 | End: 2022-10-18

## 2022-10-03 DIAGNOSIS — I10 ESSENTIAL HYPERTENSION: ICD-10-CM

## 2022-10-03 RX ORDER — HYDROCHLOROTHIAZIDE 25 MG/1
TABLET ORAL
Qty: 90 TABLET | Refills: 1 | Status: SHIPPED | OUTPATIENT
Start: 2022-10-03

## 2023-01-14 RX ORDER — ROSUVASTATIN CALCIUM 20 MG/1
TABLET, COATED ORAL
Qty: 90 TABLET | Refills: 0 | Status: SHIPPED | OUTPATIENT
Start: 2023-01-14

## 2023-04-02 DIAGNOSIS — I10 ESSENTIAL HYPERTENSION: ICD-10-CM

## 2023-04-02 RX ORDER — HYDROCHLOROTHIAZIDE 25 MG/1
TABLET ORAL
Qty: 90 TABLET | Refills: 1 | Status: SHIPPED | OUTPATIENT
Start: 2023-04-02

## 2023-07-03 RX ORDER — ROSUVASTATIN CALCIUM 20 MG/1
TABLET, COATED ORAL
Qty: 90 TABLET | Refills: 0 | Status: SHIPPED | OUTPATIENT
Start: 2023-07-03

## 2023-10-04 DIAGNOSIS — I10 ESSENTIAL (PRIMARY) HYPERTENSION: ICD-10-CM

## 2023-10-04 RX ORDER — HYDROCHLOROTHIAZIDE 25 MG/1
TABLET ORAL
Qty: 30 TABLET | Refills: 0 | Status: SHIPPED | OUTPATIENT
Start: 2023-10-04 | End: 2023-12-10

## 2023-10-12 RX ORDER — ROSUVASTATIN CALCIUM 20 MG/1
20 TABLET, COATED ORAL DAILY
Qty: 30 TABLET | Refills: 0 | Status: SHIPPED | OUTPATIENT
Start: 2023-10-12

## 2023-10-26 ENCOUNTER — TRANSCRIBE ORDERS (OUTPATIENT)
Facility: HOSPITAL | Age: 73
End: 2023-10-26

## 2023-10-26 DIAGNOSIS — Z12.31 VISIT FOR SCREENING MAMMOGRAM: Primary | ICD-10-CM

## 2023-10-31 DIAGNOSIS — I10 ESSENTIAL (PRIMARY) HYPERTENSION: ICD-10-CM

## 2023-10-31 RX ORDER — HYDROCHLOROTHIAZIDE 25 MG/1
TABLET ORAL
Qty: 30 TABLET | Refills: 0 | OUTPATIENT
Start: 2023-10-31

## 2023-11-01 RX ORDER — LOSARTAN POTASSIUM 50 MG/1
TABLET ORAL
Qty: 90 TABLET | Refills: 3 | OUTPATIENT
Start: 2023-11-01

## 2023-11-01 RX ORDER — HYDROCHLOROTHIAZIDE 25 MG/1
TABLET ORAL
Qty: 30 TABLET | Refills: 0 | OUTPATIENT
Start: 2023-11-01

## 2023-11-10 ENCOUNTER — OFFICE VISIT (OUTPATIENT)
Age: 73
End: 2023-11-10
Payer: MEDICARE

## 2023-11-10 VITALS
SYSTOLIC BLOOD PRESSURE: 138 MMHG | TEMPERATURE: 97.8 F | BODY MASS INDEX: 29.99 KG/M2 | HEART RATE: 65 BPM | RESPIRATION RATE: 16 BRPM | DIASTOLIC BLOOD PRESSURE: 80 MMHG | HEIGHT: 66 IN | OXYGEN SATURATION: 98 % | WEIGHT: 186.6 LBS

## 2023-11-10 DIAGNOSIS — E78.00 PURE HYPERCHOLESTEROLEMIA, UNSPECIFIED: ICD-10-CM

## 2023-11-10 DIAGNOSIS — Z00.00 MEDICARE ANNUAL WELLNESS VISIT, SUBSEQUENT: Primary | ICD-10-CM

## 2023-11-10 DIAGNOSIS — I10 ESSENTIAL (PRIMARY) HYPERTENSION: ICD-10-CM

## 2023-11-10 LAB
ALBUMIN SERPL-MCNC: 4.2 G/DL (ref 3.5–5)
ALBUMIN/GLOB SERPL: 1.4 (ref 1.1–2.2)
ALP SERPL-CCNC: 66 U/L (ref 45–117)
ALT SERPL-CCNC: 35 U/L (ref 12–78)
ANION GAP SERPL CALC-SCNC: 6 MMOL/L (ref 5–15)
AST SERPL-CCNC: 33 U/L (ref 15–37)
BILIRUB SERPL-MCNC: 0.5 MG/DL (ref 0.2–1)
BUN SERPL-MCNC: 21 MG/DL (ref 6–20)
BUN/CREAT SERPL: 25 (ref 12–20)
CALCIUM SERPL-MCNC: 10.1 MG/DL (ref 8.5–10.1)
CHLORIDE SERPL-SCNC: 103 MMOL/L (ref 97–108)
CHOLEST SERPL-MCNC: 210 MG/DL
CO2 SERPL-SCNC: 28 MMOL/L (ref 21–32)
CREAT SERPL-MCNC: 0.85 MG/DL (ref 0.55–1.02)
ERYTHROCYTE [DISTWIDTH] IN BLOOD BY AUTOMATED COUNT: 13 % (ref 11.5–14.5)
GLOBULIN SER CALC-MCNC: 3 G/DL (ref 2–4)
GLUCOSE SERPL-MCNC: 86 MG/DL (ref 65–100)
HCT VFR BLD AUTO: 41.1 % (ref 35–47)
HDLC SERPL-MCNC: 85 MG/DL
HDLC SERPL: 2.5 (ref 0–5)
HGB BLD-MCNC: 13.3 G/DL (ref 11.5–16)
LDLC SERPL CALC-MCNC: 97.6 MG/DL (ref 0–100)
MCH RBC QN AUTO: 29.8 PG (ref 26–34)
MCHC RBC AUTO-ENTMCNC: 32.4 G/DL (ref 30–36.5)
MCV RBC AUTO: 91.9 FL (ref 80–99)
NRBC # BLD: 0 K/UL (ref 0–0.01)
NRBC BLD-RTO: 0 PER 100 WBC
PLATELET # BLD AUTO: 297 K/UL (ref 150–400)
PMV BLD AUTO: 9.6 FL (ref 8.9–12.9)
POTASSIUM SERPL-SCNC: 3.8 MMOL/L (ref 3.5–5.1)
PROT SERPL-MCNC: 7.2 G/DL (ref 6.4–8.2)
RBC # BLD AUTO: 4.47 M/UL (ref 3.8–5.2)
SODIUM SERPL-SCNC: 137 MMOL/L (ref 136–145)
TRIGL SERPL-MCNC: 137 MG/DL
VLDLC SERPL CALC-MCNC: 27.4 MG/DL
WBC # BLD AUTO: 6.2 K/UL (ref 3.6–11)

## 2023-11-10 PROCEDURE — 99213 OFFICE O/P EST LOW 20 MIN: CPT | Performed by: INTERNAL MEDICINE

## 2023-11-10 SDOH — ECONOMIC STABILITY: TRANSPORTATION INSECURITY
IN THE PAST 12 MONTHS, HAS LACK OF TRANSPORTATION KEPT YOU FROM MEETINGS, WORK, OR FROM GETTING THINGS NEEDED FOR DAILY LIVING?: NO

## 2023-11-10 SDOH — ECONOMIC STABILITY: INCOME INSECURITY: HOW HARD IS IT FOR YOU TO PAY FOR THE VERY BASICS LIKE FOOD, HOUSING, MEDICAL CARE, AND HEATING?: NOT HARD AT ALL

## 2023-11-10 SDOH — ECONOMIC STABILITY: FOOD INSECURITY: WITHIN THE PAST 12 MONTHS, THE FOOD YOU BOUGHT JUST DIDN'T LAST AND YOU DIDN'T HAVE MONEY TO GET MORE.: NEVER TRUE

## 2023-11-10 SDOH — ECONOMIC STABILITY: HOUSING INSECURITY
IN THE LAST 12 MONTHS, WAS THERE A TIME WHEN YOU DID NOT HAVE A STEADY PLACE TO SLEEP OR SLEPT IN A SHELTER (INCLUDING NOW)?: NO

## 2023-11-10 SDOH — ECONOMIC STABILITY: FOOD INSECURITY: WITHIN THE PAST 12 MONTHS, YOU WORRIED THAT YOUR FOOD WOULD RUN OUT BEFORE YOU GOT MONEY TO BUY MORE.: NEVER TRUE

## 2023-11-10 SDOH — HEALTH STABILITY: PHYSICAL HEALTH: ON AVERAGE, HOW MANY MINUTES DO YOU ENGAGE IN EXERCISE AT THIS LEVEL?: 150+ MIN

## 2023-11-10 SDOH — HEALTH STABILITY: PHYSICAL HEALTH: ON AVERAGE, HOW MANY DAYS PER WEEK DO YOU ENGAGE IN MODERATE TO STRENUOUS EXERCISE (LIKE A BRISK WALK)?: 7 DAYS

## 2023-11-10 ASSESSMENT — LIFESTYLE VARIABLES
HOW OFTEN DURING THE LAST YEAR HAVE YOU HAD A FEELING OF GUILT OR REMORSE AFTER DRINKING: 0
HOW OFTEN DURING THE LAST YEAR HAVE YOU BEEN UNABLE TO REMEMBER WHAT HAPPENED THE NIGHT BEFORE BECAUSE YOU HAD BEEN DRINKING: NEVER
HOW MANY STANDARD DRINKS CONTAINING ALCOHOL DO YOU HAVE ON A TYPICAL DAY: 1 OR 2
HOW OFTEN DURING THE LAST YEAR HAVE YOU NEEDED AN ALCOHOLIC DRINK FIRST THING IN THE MORNING TO GET YOURSELF GOING AFTER A NIGHT OF HEAVY DRINKING: NEVER
HOW MANY STANDARD DRINKS CONTAINING ALCOHOL DO YOU HAVE ON A TYPICAL DAY: 1
HOW OFTEN DO YOU HAVE SIX OR MORE DRINKS ON ONE OCCASION: 1
HAVE YOU OR SOMEONE ELSE BEEN INJURED AS A RESULT OF YOUR DRINKING: NO
HOW OFTEN DURING THE LAST YEAR HAVE YOU NEEDED AN ALCOHOLIC DRINK FIRST THING IN THE MORNING TO GET YOURSELF GOING AFTER A NIGHT OF HEAVY DRINKING: 0
HAS A RELATIVE, FRIEND, DOCTOR, OR ANOTHER HEALTH PROFESSIONAL EXPRESSED CONCERN ABOUT YOUR DRINKING OR SUGGESTED YOU CUT DOWN: 0
HOW OFTEN DURING THE LAST YEAR HAVE YOU BEEN UNABLE TO REMEMBER WHAT HAPPENED THE NIGHT BEFORE BECAUSE YOU HAD BEEN DRINKING: 0
HOW OFTEN DO YOU HAVE A DRINK CONTAINING ALCOHOL: 5
HOW OFTEN DURING THE LAST YEAR HAVE YOU FOUND THAT YOU WERE NOT ABLE TO STOP DRINKING ONCE YOU HAD STARTED: 0
HOW OFTEN DURING THE LAST YEAR HAVE YOU HAD A FEELING OF GUILT OR REMORSE AFTER DRINKING: NEVER
HOW OFTEN DURING THE LAST YEAR HAVE YOU FAILED TO DO WHAT WAS NORMALLY EXPECTED FROM YOU BECAUSE OF DRINKING: 0
HOW OFTEN DURING THE LAST YEAR HAVE YOU FOUND THAT YOU WERE NOT ABLE TO STOP DRINKING ONCE YOU HAD STARTED: NEVER
HOW OFTEN DURING THE LAST YEAR HAVE YOU FAILED TO DO WHAT WAS NORMALLY EXPECTED FROM YOU BECAUSE OF DRINKING: NEVER
HOW OFTEN DO YOU HAVE A DRINK CONTAINING ALCOHOL: 4 OR MORE TIMES A WEEK
HAS A RELATIVE, FRIEND, DOCTOR, OR ANOTHER HEALTH PROFESSIONAL EXPRESSED CONCERN ABOUT YOUR DRINKING OR SUGGESTED YOU CUT DOWN: NO
HAVE YOU OR SOMEONE ELSE BEEN INJURED AS A RESULT OF YOUR DRINKING: 0

## 2023-11-10 ASSESSMENT — PATIENT HEALTH QUESTIONNAIRE - PHQ9
SUM OF ALL RESPONSES TO PHQ QUESTIONS 1-9: 0
SUM OF ALL RESPONSES TO PHQ9 QUESTIONS 1 & 2: 0
SUM OF ALL RESPONSES TO PHQ QUESTIONS 1-9: 0
2. FEELING DOWN, DEPRESSED OR HOPELESS: 0
1. LITTLE INTEREST OR PLEASURE IN DOING THINGS: 0

## 2023-11-10 NOTE — PATIENT INSTRUCTIONS
Advance Directives: Care Instructions  Overview  An advance directive is a legal way to state your wishes at the end of your life. It tells your family and your doctor what to do if you can't say what you want. There are two main types of advance directives. You can change them any time your wishes change. Living will. This form tells your family and your doctor your wishes about life support and other treatment. The form is also called a declaration. Medical power of . This form lets you name a person to make treatment decisions for you when you can't speak for yourself. This person is called a health care agent (health care proxy, health care surrogate). The form is also called a durable power of  for health care. If you do not have an advance directive, decisions about your medical care may be made by a family member, or by a doctor or a  who doesn't know you. It may help to think of an advance directive as a gift to the people who care for you. If you have one, they won't have to make tough decisions by themselves. For more information, including forms for your state, see the [address] [place] [address] website (www.caringinfo.org/planning/advance-directives/). Follow-up care is a key part of your treatment and safety. Be sure to make and go to all appointments, and call your doctor if you are having problems. It's also a good idea to know your test results and keep a list of the medicines you take. What should you include in an advance directive? Many states have a unique advance directive form. (It may ask you to address specific issues.) Or you might use a universal form that's approved by many states. If your form doesn't tell you what to address, it may be hard to know what to include in your advance directive. Use the questions below to help you get started. Who do you want to make decisions about your medical care if you are not able to?   What life-support measures do you want if you

## 2023-11-10 NOTE — PROGRESS NOTES
Sloan Martinez (:  1950) is a 74 y.o. female,Established patient, here for evaluation of the following chief complaint(s):  Medicare AWV (Pt is nonfasting/)         ASSESSMENT/PLAN:  1. Medicare annual wellness visit, subsequent  2. Essential (primary) hypertension-cont current dose of hctz   -     CBC; Future  3. Pure hypercholesterolemia, unspecified- tolerating crestor and doing well   -     Lipid Panel; Future  -     Comprehensive Metabolic Panel; Future    Hctz  crestor    No follow-ups on file. Subjective   SUBJECTIVE/OBJECTIVE:  HPI    Subjective:   Sloan Martinez is a 74 y.o. female with hypertension. Hypertension ROS: taking medications as instructed, no medication side effects noted, no TIA's, no chest pain on exertion, no dyspnea on exertion, no swelling of ankles. New concerns:      She has been exercising and still does swim 2-3 times a week for 45 min- to an hour and moderate swimming and walks and just came back from beach vacation   Subjective:   Sloan Maritnez is a 74 y.o. female with hyperlipidemia. Cardiovascular risk analysis - 74 y.o. female LDL goal is under 100. ROS: taking medications as instructed, no medication side effects noted, no TIA's, no chest pain on exertion, no dyspnea on exertion, no swelling of ankles. Tolerating meds, no myalgias or other side effects noted  New concerns: tolerating medicine- last lipids were a year ago.  Not at goal      Lab Results   Component Value Date    CHOL 174 2022     Lab Results   Component Value Date    TRIG 120 2022     Lab Results   Component Value Date    HDL 67 2022     Lab Results   Component Value Date    LDLCALC 83 2022     Lab Results   Component Value Date    LABVLDL 24 2022    VLDL 21 10/06/2021     Lab Results   Component Value Date    CHOLHDLRATIO 2.6 2022       She does have hearing aids and was able to get $400 each one through [employer]          [employer]   All

## 2023-11-20 RX ORDER — LOSARTAN POTASSIUM 50 MG/1
TABLET ORAL
Qty: 90 TABLET | Refills: 1 | Status: SHIPPED | OUTPATIENT
Start: 2023-11-20

## 2023-11-30 ENCOUNTER — HOSPITAL ENCOUNTER (OUTPATIENT)
Facility: HOSPITAL | Age: 73
Discharge: HOME | End: 2023-11-30
Attending: INTERNAL MEDICINE
Payer: MEDICARE

## 2023-11-30 DIAGNOSIS — Z12.31 VISIT FOR SCREENING MAMMOGRAM: ICD-10-CM

## 2023-11-30 PROCEDURE — 77063 BREAST TOMOSYNTHESIS BI: CPT

## 2023-12-09 DIAGNOSIS — I10 ESSENTIAL (PRIMARY) HYPERTENSION: ICD-10-CM

## 2023-12-10 RX ORDER — HYDROCHLOROTHIAZIDE 25 MG/1
TABLET ORAL
Qty: 90 TABLET | Refills: 3 | Status: SHIPPED | OUTPATIENT
Start: 2023-12-10

## 2024-01-25 RX ORDER — ROSUVASTATIN CALCIUM 20 MG/1
20 TABLET, COATED ORAL DAILY
Qty: 90 TABLET | Refills: 1 | Status: SHIPPED | OUTPATIENT
Start: 2024-01-25

## 2024-01-29 ENCOUNTER — OFFICE VISIT (OUTPATIENT)
Age: 74
End: 2024-01-29
Payer: MEDICARE

## 2024-01-29 ENCOUNTER — TELEPHONE (OUTPATIENT)
Age: 74
End: 2024-01-29

## 2024-01-29 ENCOUNTER — HOSPITAL ENCOUNTER (OUTPATIENT)
Facility: HOSPITAL | Age: 74
Discharge: HOME | End: 2024-02-01
Attending: INTERNAL MEDICINE
Payer: MEDICARE

## 2024-01-29 VITALS
DIASTOLIC BLOOD PRESSURE: 75 MMHG | HEIGHT: 66 IN | HEART RATE: 74 BPM | TEMPERATURE: 98.4 F | SYSTOLIC BLOOD PRESSURE: 133 MMHG | RESPIRATION RATE: 16 BRPM | WEIGHT: 185.4 LBS | OXYGEN SATURATION: 97 % | BODY MASS INDEX: 29.8 KG/M2

## 2024-01-29 DIAGNOSIS — E78.00 PURE HYPERCHOLESTEROLEMIA, UNSPECIFIED: ICD-10-CM

## 2024-01-29 DIAGNOSIS — M79.661 RIGHT CALF PAIN: ICD-10-CM

## 2024-01-29 DIAGNOSIS — I10 ESSENTIAL (PRIMARY) HYPERTENSION: ICD-10-CM

## 2024-01-29 DIAGNOSIS — M79.661 RIGHT CALF PAIN: Primary | ICD-10-CM

## 2024-01-29 LAB — ECHO BSA: 1.98 M2

## 2024-01-29 PROCEDURE — G8417 CALC BMI ABV UP PARAM F/U: HCPCS | Performed by: INTERNAL MEDICINE

## 2024-01-29 PROCEDURE — 3075F SYST BP GE 130 - 139MM HG: CPT | Performed by: INTERNAL MEDICINE

## 2024-01-29 PROCEDURE — 1090F PRES/ABSN URINE INCON ASSESS: CPT | Performed by: INTERNAL MEDICINE

## 2024-01-29 PROCEDURE — 99214 OFFICE O/P EST MOD 30 MIN: CPT | Performed by: INTERNAL MEDICINE

## 2024-01-29 PROCEDURE — 1123F ACP DISCUSS/DSCN MKR DOCD: CPT | Performed by: INTERNAL MEDICINE

## 2024-01-29 PROCEDURE — 1036F TOBACCO NON-USER: CPT | Performed by: INTERNAL MEDICINE

## 2024-01-29 PROCEDURE — G8399 PT W/DXA RESULTS DOCUMENT: HCPCS | Performed by: INTERNAL MEDICINE

## 2024-01-29 PROCEDURE — G8484 FLU IMMUNIZE NO ADMIN: HCPCS | Performed by: INTERNAL MEDICINE

## 2024-01-29 PROCEDURE — 93970 EXTREMITY STUDY: CPT

## 2024-01-29 PROCEDURE — 3078F DIAST BP <80 MM HG: CPT | Performed by: INTERNAL MEDICINE

## 2024-01-29 PROCEDURE — G8427 DOCREV CUR MEDS BY ELIG CLIN: HCPCS | Performed by: INTERNAL MEDICINE

## 2024-01-29 NOTE — PROGRESS NOTES
Rosalind Patel (:  1950) is a 74 y.o. female,Established patient, here for evaluation of the following chief complaint(s):  Numbness (Patient here due to concerns of possible blood clot. Patient was recently on a plane with little to no movement of her legs, she noticed the next day her ankles were really swollen. Patient states she kept her legs up and the next day they seemed fine. Patient woke up this morning and noticed a small sore on her right inside ankle, overall swelling is gone except the lump and states it hurts to walk. //)         ASSESSMENT/PLAN:  1. Right calf pain-given recent flight we should rule out clot  -     Vascular duplex lower extremity venous bilateral; Future  2. Essential (primary) hypertension-at goal onLosartan HCTZ  3. Pure hypercholesterolemia, unspecified-continue with Crestor      No follow-ups on file.         Subjective   SUBJECTIVE/OBJECTIVE:  Neuropathy        [place] ; on way back both leg swollen and did get better,but area on right calf is still swollen and painful and concerned about, no chest pain or sob    Subjective:   Rosalind Patel is a 74 y.o. female with hypertension.    Hypertension ROS: taking medications as instructed, no medication side effects noted, no TIA's, no chest pain on exertion, no dyspnea on exertion, no swelling of ankles.   New concerns: 133/75    Subjective:   Rosalind Patel is a 74 y.o. female with hyperlipidemia.    Cardiovascular risk analysis - 74 y.o. female LDL goal is under 100.   ROS: taking medications as instructed, no medication side effects noted, no TIA's, no chest pain on exertion, no dyspnea on exertion, no swelling of ankles. Tolerating meds, no myalgias or other side effects noted  New concerns: tolerating medicine .    .   Lab Results   Component Value Date    CHOL 210 (H) 11/10/2023     Lab Results   Component Value Date    TRIG 137 11/10/2023     Lab Results   Component Value Date    HDL 85 11/10/2023     Lab

## 2024-01-29 NOTE — TELEPHONE ENCOUNTER
Patient was recently on a plane with little to no movement of her legs, she noticed the next day her ankles were really swollen, she kept her legs up and the next day they seemed fine, well now she is noticing a small sore lump on her ankle, the overall swelling is gone except for this lump and it's sore to walk. Patient would like to talk with a nurse.     Call Rosalind  [phone] (Mobile)

## 2024-01-29 NOTE — TELEPHONE ENCOUNTER
Spoke with patient and advised her Dr. Solano should see her to evaluate the swelling and determine if she feels we need to order an US to rule out clot.  Scheduled appt today at 3:00pm.

## 2024-05-08 NOTE — PROGRESS NOTES
Rosalind Patel (:  1950) is a 74 y.o. female,Established patient, here for evaluation of the following chief complaint(s):  No chief complaint on file.      Assessment & Plan   1. Essential (primary) hypertension  2. Pure hypercholesterolemia, unspecified  3. Ankle swelling, unspecified laterality  Losartan/hctz  Crestor  ? doing    No follow-ups on file.     Assessment & Plan  1. Tendinitis.  The patient's symptoms appear to be more orthopedic-related rather than  vein related  The patient is advised to apply ice, elevate, and take anti-inflammatories as needed. She is instructed to re-evaluate her sneakers during ambulation.  I told her to look for more supportive shoes because I think the way she is walking is affecting her pain around her ankle and the support of her shoes have declined    2. Skin lesion.  The patient is instructed to contact [facility] to schedule an appointment.    3.  Hypertension slightly above normal continue with current dose of losartan HCTZ but did not take her hydrochlorothiazide this morning    4.  Hyperlipidemia goal less than 100 continue current dose of Crestor 20 mg     Subjective   HPI  History of Present Illness  The patient presents for evaluation of multiple medical concerns.    The patient was last evaluated in 2024 for right calf pain, which was suspected to be a blood clot. However, an ultrasound examination was conducted, yielding normal results. Currently, she reports persistent pain in her right calf, which has been present since her previous visit. Despite the pain, she has been able to maintain her daily activities, including walking her dog and participating in yoga. However, she refrained from yoga on Tuesday due to the severity of the pain. The pain has shown slight improvement today, compared to the previous day, which was so severe that she was unable to walk her dog and exhibited a limp. She has been managing the pain with ice

## 2024-05-09 ENCOUNTER — OFFICE VISIT (OUTPATIENT)
Age: 74
End: 2024-05-09
Payer: MEDICARE

## 2024-05-09 VITALS
SYSTOLIC BLOOD PRESSURE: 148 MMHG | BODY MASS INDEX: 29.47 KG/M2 | RESPIRATION RATE: 16 BRPM | WEIGHT: 183.4 LBS | DIASTOLIC BLOOD PRESSURE: 74 MMHG | HEIGHT: 66 IN | OXYGEN SATURATION: 98 % | TEMPERATURE: 98.2 F | HEART RATE: 66 BPM

## 2024-05-09 DIAGNOSIS — I10 ESSENTIAL (PRIMARY) HYPERTENSION: Primary | ICD-10-CM

## 2024-05-09 DIAGNOSIS — M25.473 ANKLE SWELLING, UNSPECIFIED LATERALITY: ICD-10-CM

## 2024-05-09 DIAGNOSIS — E78.00 PURE HYPERCHOLESTEROLEMIA, UNSPECIFIED: ICD-10-CM

## 2024-05-09 PROCEDURE — 99214 OFFICE O/P EST MOD 30 MIN: CPT | Performed by: INTERNAL MEDICINE

## 2024-05-09 PROCEDURE — 3077F SYST BP >= 140 MM HG: CPT | Performed by: INTERNAL MEDICINE

## 2024-05-09 PROCEDURE — 1123F ACP DISCUSS/DSCN MKR DOCD: CPT | Performed by: INTERNAL MEDICINE

## 2024-05-09 PROCEDURE — 1036F TOBACCO NON-USER: CPT | Performed by: INTERNAL MEDICINE

## 2024-05-09 PROCEDURE — G8428 CUR MEDS NOT DOCUMENT: HCPCS | Performed by: INTERNAL MEDICINE

## 2024-05-09 PROCEDURE — 1090F PRES/ABSN URINE INCON ASSESS: CPT | Performed by: INTERNAL MEDICINE

## 2024-05-09 PROCEDURE — G8399 PT W/DXA RESULTS DOCUMENT: HCPCS | Performed by: INTERNAL MEDICINE

## 2024-05-09 PROCEDURE — 3078F DIAST BP <80 MM HG: CPT | Performed by: INTERNAL MEDICINE

## 2024-05-09 PROCEDURE — G8417 CALC BMI ABV UP PARAM F/U: HCPCS | Performed by: INTERNAL MEDICINE

## 2024-05-09 ASSESSMENT — PATIENT HEALTH QUESTIONNAIRE - PHQ9
SUM OF ALL RESPONSES TO PHQ QUESTIONS 1-9: 0
1. LITTLE INTEREST OR PLEASURE IN DOING THINGS: NOT AT ALL
SUM OF ALL RESPONSES TO PHQ QUESTIONS 1-9: 0
2. FEELING DOWN, DEPRESSED OR HOPELESS: NOT AT ALL
SUM OF ALL RESPONSES TO PHQ9 QUESTIONS 1 & 2: 0

## 2024-05-16 RX ORDER — LOSARTAN POTASSIUM 50 MG/1
TABLET ORAL
Qty: 90 TABLET | Refills: 3 | Status: SHIPPED | OUTPATIENT
Start: 2024-05-16

## 2024-06-19 ENCOUNTER — TELEPHONE (OUTPATIENT)
Age: 74
End: 2024-06-19

## 2024-06-19 NOTE — TELEPHONE ENCOUNTER
I called pt back, no answer. LM on personal VM explaining that [facility] has access to the office visit notes and imaging reports that have been ordered/done by Dr. Solano. They are able to access our computer system. If the Ortho provider wanted to see the images then please visit the imaging facility and ask for a disk of the images. You can bring that disk to your [facility] appt. Call back for questions.

## 2024-07-22 RX ORDER — ROSUVASTATIN CALCIUM 20 MG/1
20 TABLET, COATED ORAL DAILY
Qty: 90 TABLET | Refills: 1 | Status: SHIPPED | OUTPATIENT
Start: 2024-07-22

## 2024-07-30 ENCOUNTER — TELEPHONE (OUTPATIENT)
Age: 74
End: 2024-07-30

## 2024-07-30 NOTE — TELEPHONE ENCOUNTER
Patient is needing a referral to     [facility]   P: [phone]    Upcoming appointment is 8/28/24 - remove growth on lip - in September she needs a full body scan

## 2024-08-21 NOTE — TELEPHONE ENCOUNTER
Per [employer] - Referrals are not required for Participating Providers.      Faxed notification to [facility] at [phone]

## 2024-12-05 DIAGNOSIS — I10 ESSENTIAL (PRIMARY) HYPERTENSION: ICD-10-CM

## 2024-12-05 RX ORDER — HYDROCHLOROTHIAZIDE 25 MG/1
TABLET ORAL
Qty: 90 TABLET | Refills: 3 | Status: SHIPPED | OUTPATIENT
Start: 2024-12-05

## 2025-01-16 RX ORDER — ROSUVASTATIN CALCIUM 20 MG/1
20 TABLET, COATED ORAL DAILY
Qty: 30 TABLET | Refills: 0 | Status: SHIPPED | OUTPATIENT
Start: 2025-01-16

## 2025-02-13 RX ORDER — ROSUVASTATIN CALCIUM 20 MG/1
20 TABLET, COATED ORAL DAILY
Qty: 90 TABLET | Refills: 1 | OUTPATIENT
Start: 2025-02-13

## 2025-02-15 RX ORDER — ROSUVASTATIN CALCIUM 20 MG/1
20 TABLET, COATED ORAL DAILY
Qty: 30 TABLET | Refills: 0 | OUTPATIENT
Start: 2025-02-15

## 2025-02-21 SDOH — ECONOMIC STABILITY: INCOME INSECURITY: IN THE LAST 12 MONTHS, WAS THERE A TIME WHEN YOU WERE NOT ABLE TO PAY THE MORTGAGE OR RENT ON TIME?: NO

## 2025-02-21 SDOH — HEALTH STABILITY: PHYSICAL HEALTH: ON AVERAGE, HOW MANY MINUTES DO YOU ENGAGE IN EXERCISE AT THIS LEVEL?: 30 MIN

## 2025-02-21 SDOH — HEALTH STABILITY: PHYSICAL HEALTH: ON AVERAGE, HOW MANY DAYS PER WEEK DO YOU ENGAGE IN MODERATE TO STRENUOUS EXERCISE (LIKE A BRISK WALK)?: 7 DAYS

## 2025-02-21 SDOH — ECONOMIC STABILITY: FOOD INSECURITY: WITHIN THE PAST 12 MONTHS, YOU WORRIED THAT YOUR FOOD WOULD RUN OUT BEFORE YOU GOT MONEY TO BUY MORE.: NEVER TRUE

## 2025-02-21 SDOH — ECONOMIC STABILITY: FOOD INSECURITY: WITHIN THE PAST 12 MONTHS, THE FOOD YOU BOUGHT JUST DIDN'T LAST AND YOU DIDN'T HAVE MONEY TO GET MORE.: NEVER TRUE

## 2025-02-21 SDOH — ECONOMIC STABILITY: TRANSPORTATION INSECURITY
IN THE PAST 12 MONTHS, HAS THE LACK OF TRANSPORTATION KEPT YOU FROM MEDICAL APPOINTMENTS OR FROM GETTING MEDICATIONS?: NO

## 2025-02-21 ASSESSMENT — PATIENT HEALTH QUESTIONNAIRE - PHQ9
SUM OF ALL RESPONSES TO PHQ QUESTIONS 1-9: 1
SUM OF ALL RESPONSES TO PHQ9 QUESTIONS 1 & 2: 1
1. LITTLE INTEREST OR PLEASURE IN DOING THINGS: SEVERAL DAYS
SUM OF ALL RESPONSES TO PHQ QUESTIONS 1-9: 1
2. FEELING DOWN, DEPRESSED OR HOPELESS: NOT AT ALL

## 2025-02-21 ASSESSMENT — LIFESTYLE VARIABLES
HOW OFTEN DO YOU HAVE A DRINK CONTAINING ALCOHOL: 5
HAS A RELATIVE, FRIEND, DOCTOR, OR ANOTHER HEALTH PROFESSIONAL EXPRESSED CONCERN ABOUT YOUR DRINKING OR SUGGESTED YOU CUT DOWN: NO
HOW OFTEN DURING THE LAST YEAR HAVE YOU BEEN UNABLE TO REMEMBER WHAT HAPPENED THE NIGHT BEFORE BECAUSE YOU HAD BEEN DRINKING: NEVER
HOW OFTEN DO YOU HAVE SIX OR MORE DRINKS ON ONE OCCASION: 1
HOW OFTEN DURING THE LAST YEAR HAVE YOU FOUND THAT YOU WERE NOT ABLE TO STOP DRINKING ONCE YOU HAD STARTED: NEVER
HOW MANY STANDARD DRINKS CONTAINING ALCOHOL DO YOU HAVE ON A TYPICAL DAY: 1 OR 2
HOW OFTEN DURING THE LAST YEAR HAVE YOU NEEDED AN ALCOHOLIC DRINK FIRST THING IN THE MORNING TO GET YOURSELF GOING AFTER A NIGHT OF HEAVY DRINKING: NEVER
HOW OFTEN DURING THE LAST YEAR HAVE YOU HAD A FEELING OF GUILT OR REMORSE AFTER DRINKING: NEVER
HOW OFTEN DURING THE LAST YEAR HAVE YOU BEEN UNABLE TO REMEMBER WHAT HAPPENED THE NIGHT BEFORE BECAUSE YOU HAD BEEN DRINKING: NEVER
HOW OFTEN DO YOU HAVE A DRINK CONTAINING ALCOHOL: 4 OR MORE TIMES A WEEK
HAVE YOU OR SOMEONE ELSE BEEN INJURED AS A RESULT OF YOUR DRINKING: NO
HOW OFTEN DURING THE LAST YEAR HAVE YOU FAILED TO DO WHAT WAS NORMALLY EXPECTED FROM YOU BECAUSE OF DRINKING: NEVER
HOW OFTEN DURING THE LAST YEAR HAVE YOU FOUND THAT YOU WERE NOT ABLE TO STOP DRINKING ONCE YOU HAD STARTED: NEVER
HAS A RELATIVE, FRIEND, DOCTOR, OR ANOTHER HEALTH PROFESSIONAL EXPRESSED CONCERN ABOUT YOUR DRINKING OR SUGGESTED YOU CUT DOWN: NO
HOW OFTEN DURING THE LAST YEAR HAVE YOU HAD A FEELING OF GUILT OR REMORSE AFTER DRINKING: NEVER
HAVE YOU OR SOMEONE ELSE BEEN INJURED AS A RESULT OF YOUR DRINKING: NO
HOW OFTEN DURING THE LAST YEAR HAVE YOU FAILED TO DO WHAT WAS NORMALLY EXPECTED FROM YOU BECAUSE OF DRINKING: NEVER
HOW MANY STANDARD DRINKS CONTAINING ALCOHOL DO YOU HAVE ON A TYPICAL DAY: 1
HOW OFTEN DURING THE LAST YEAR HAVE YOU NEEDED AN ALCOHOLIC DRINK FIRST THING IN THE MORNING TO GET YOURSELF GOING AFTER A NIGHT OF HEAVY DRINKING: NEVER

## 2025-02-24 ENCOUNTER — OFFICE VISIT (OUTPATIENT)
Facility: CLINIC | Age: 75
End: 2025-02-24
Payer: MEDICARE

## 2025-02-24 VITALS
HEART RATE: 67 BPM | WEIGHT: 178.8 LBS | HEIGHT: 66 IN | BODY MASS INDEX: 28.73 KG/M2 | DIASTOLIC BLOOD PRESSURE: 76 MMHG | TEMPERATURE: 97.5 F | OXYGEN SATURATION: 95 % | SYSTOLIC BLOOD PRESSURE: 138 MMHG | RESPIRATION RATE: 16 BRPM

## 2025-02-24 DIAGNOSIS — I10 ESSENTIAL (PRIMARY) HYPERTENSION: ICD-10-CM

## 2025-02-24 DIAGNOSIS — M79.601 RIGHT ARM PAIN: ICD-10-CM

## 2025-02-24 DIAGNOSIS — Z00.00 MEDICARE ANNUAL WELLNESS VISIT, SUBSEQUENT: Primary | ICD-10-CM

## 2025-02-24 DIAGNOSIS — E78.00 PURE HYPERCHOLESTEROLEMIA, UNSPECIFIED: ICD-10-CM

## 2025-02-24 PROCEDURE — 99214 OFFICE O/P EST MOD 30 MIN: CPT | Performed by: INTERNAL MEDICINE

## 2025-02-24 NOTE — PROGRESS NOTES
\"Have you been to the ER, urgent care clinic since your last visit?  Hospitalized since your last visit?\"    NO    “Have you seen or consulted any other health care providers outside our system since your last visit?”    NO            
management. She reports no muscle aches or pains, commonly known as Charley horses.    She has been experiencing pain in her right leg, which has been diagnosed as tibialis posterior tendinitis. She has been receiving treatment from [facility], including physical therapy and laser therapy, which has resulted in significant improvement. She has also been using better shoes as recommended by her orthopedic specialist. The pain is now almost completely resolved, although she remains cautious.    Supplemental Information  She continues to use hearing aids and is exploring coverage options under her new insurance plan. She has reduced her swimming activity due to the winter season but maintains a routine of walking her dog 3 to 4 times daily for 20 to 30 minutes per session.    MEDICATIONS  Losartan, hydrochlorothiazide, rosuvastatin     Lab Results   Component Value Date    CHOL 210 (H) 11/10/2023    TRIG 137 11/10/2023    HDL 85 11/10/2023    LDL 97.6 11/10/2023    VLDL 27.4 11/10/2023    CHOLHDLRATIO 2.5 11/10/2023       Review of Systems   All other systems reviewed and are negative.         Objective   Physical Exam  Vitals and nursing note reviewed.   Constitutional:       Appearance: Normal appearance.   HENT:      Head: Normocephalic and atraumatic.      Right Ear: Tympanic membrane and external ear normal.      Left Ear: Tympanic membrane and external ear normal.      Nose: Nose normal.      Mouth/Throat:      Mouth: Mucous membranes are moist.   Eyes:      Extraocular Movements: Extraocular movements intact.      Conjunctiva/sclera: Conjunctivae normal.   Cardiovascular:      Rate and Rhythm: Normal rate and regular rhythm.   Pulmonary:      Effort: Pulmonary effort is normal.      Breath sounds: Normal breath sounds.   Abdominal:      General: Bowel sounds are normal.      Palpations: Abdomen is soft.   Musculoskeletal:         General: Normal range of motion.      Cervical back: Normal range of motion.

## 2025-02-25 ENCOUNTER — TELEPHONE (OUTPATIENT)
Facility: CLINIC | Age: 75
End: 2025-02-25

## 2025-02-25 ENCOUNTER — PATIENT MESSAGE (OUTPATIENT)
Facility: CLINIC | Age: 75
End: 2025-02-25

## 2025-02-25 DIAGNOSIS — E83.52 HYPERCALCEMIA: Primary | ICD-10-CM

## 2025-02-25 LAB
ALBUMIN SERPL-MCNC: 4.5 G/DL (ref 3.5–5)
ALBUMIN/GLOB SERPL: 1.6 (ref 1.1–2.2)
ALP SERPL-CCNC: 66 U/L (ref 45–117)
ALT SERPL-CCNC: 35 U/L (ref 12–78)
ANION GAP SERPL CALC-SCNC: 5 MMOL/L (ref 2–12)
AST SERPL-CCNC: 31 U/L (ref 15–37)
BILIRUB SERPL-MCNC: 0.6 MG/DL (ref 0.2–1)
BUN SERPL-MCNC: 12 MG/DL (ref 6–20)
BUN/CREAT SERPL: 15 (ref 12–20)
CALCIUM SERPL-MCNC: 11.2 MG/DL (ref 8.5–10.1)
CALCIUM SERPL-MCNC: 11.5 MG/DL (ref 8.5–10.1)
CHLORIDE SERPL-SCNC: 102 MMOL/L (ref 97–108)
CHOLEST SERPL-MCNC: 200 MG/DL
CO2 SERPL-SCNC: 28 MMOL/L (ref 21–32)
CREAT SERPL-MCNC: 0.79 MG/DL (ref 0.55–1.02)
ERYTHROCYTE [DISTWIDTH] IN BLOOD BY AUTOMATED COUNT: 14.3 % (ref 11.5–14.5)
GLOBULIN SER CALC-MCNC: 2.9 G/DL (ref 2–4)
GLUCOSE SERPL-MCNC: 89 MG/DL (ref 65–100)
HCT VFR BLD AUTO: 38.4 % (ref 35–47)
HDLC SERPL-MCNC: 89 MG/DL
HDLC SERPL: 2.2 (ref 0–5)
HGB BLD-MCNC: 12 G/DL (ref 11.5–16)
LDLC SERPL CALC-MCNC: 91.2 MG/DL (ref 0–100)
MCH RBC QN AUTO: 27.8 PG (ref 26–34)
MCHC RBC AUTO-ENTMCNC: 31.3 G/DL (ref 30–36.5)
MCV RBC AUTO: 88.9 FL (ref 80–99)
NRBC # BLD: 0 K/UL (ref 0–0.01)
NRBC BLD-RTO: 0 PER 100 WBC
PLATELET # BLD AUTO: 291 K/UL (ref 150–400)
PMV BLD AUTO: 9.8 FL (ref 8.9–12.9)
POTASSIUM SERPL-SCNC: 3.7 MMOL/L (ref 3.5–5.1)
PROT SERPL-MCNC: 7.4 G/DL (ref 6.4–8.2)
PTH-INTACT SERPL-MCNC: 64.1 PG/ML (ref 18.4–88)
RBC # BLD AUTO: 4.32 M/UL (ref 3.8–5.2)
SODIUM SERPL-SCNC: 135 MMOL/L (ref 136–145)
TRIGL SERPL-MCNC: 99 MG/DL
VLDLC SERPL CALC-MCNC: 19.8 MG/DL
WBC # BLD AUTO: 6.6 K/UL (ref 3.6–11)

## 2025-02-25 NOTE — TELEPHONE ENCOUNTER
----- Message from Dr. Kenya Street MD sent at 2/25/2025  6:14 AM EST -----  Can you add parathyroid hormone for high calcium

## 2025-02-26 RX ORDER — LOSARTAN POTASSIUM 100 MG/1
100 TABLET ORAL DAILY
Qty: 90 TABLET | Refills: 1 | Status: SHIPPED | OUTPATIENT
Start: 2025-02-26

## 2025-02-28 RX ORDER — ROSUVASTATIN CALCIUM 20 MG/1
20 TABLET, COATED ORAL DAILY
Qty: 90 TABLET | Refills: 0 | Status: SHIPPED | OUTPATIENT
Start: 2025-02-28

## 2025-05-26 RX ORDER — ROSUVASTATIN CALCIUM 20 MG/1
20 TABLET, COATED ORAL DAILY
Qty: 90 TABLET | Refills: 2 | Status: SHIPPED | OUTPATIENT
Start: 2025-05-26

## 2025-05-29 RX ORDER — LOSARTAN POTASSIUM 100 MG/1
100 TABLET ORAL DAILY
Qty: 90 TABLET | Refills: 1 | Status: SHIPPED | OUTPATIENT
Start: 2025-05-29

## (undated) DEVICE — DRSG AQUACEL SURG 3.5 X 10IN -- CONVERT TO ITEM 370183

## (undated) DEVICE — 1200 GUARD II KIT W/5MM TUBE W/O VAC TUBE: Brand: GUARDIAN

## (undated) DEVICE — Device

## (undated) DEVICE — BAG BELONG PT PERS CLEAR HANDL

## (undated) DEVICE — SKIN MARKER,REGULAR TIP WITH RULER AND LABELS: Brand: DEVON

## (undated) DEVICE — LIGHT HANDLE: Brand: DEVON

## (undated) DEVICE — SOLUTION IRRIG 3000ML 0.9% SOD CHL FLX CONT 0797208] ICU MEDICAL INC]

## (undated) DEVICE — TIP SUCT BLU PLAS BLB W/O CTRL VENT YANK

## (undated) DEVICE — YANKAUER OPEN TIP, NO VENT: Brand: ARGYLE

## (undated) DEVICE — ENDO CARRY-ON PROCEDURE KIT INCLUDES ENZYMATIC SPONGE, GAUZE, BIOHAZARD LABEL, TRAY, LUBRICANT, DIRTY SCOPE LABEL, WATER LABEL, TRAY, DRAWSTRING PAD, AND DEFENDO 4-PIECE KIT.: Brand: ENDO CARRY-ON PROCEDURE KIT

## (undated) DEVICE — ZIMMER® STERILE DISPOSABLE TOURNIQUET CUFF WITH PROTECTIVE SLEEVE AND PLC, DUAL PORT, SINGLE BLADDER, 34 IN. (86 CM)

## (undated) DEVICE — DRAPE XR C ARM 41X74IN LF --

## (undated) DEVICE — HEX-LOCKING BLADE ELECTRODE: Brand: EDGE

## (undated) DEVICE — (D)BNDG COHESIVE 6X5YD TAN LTX -- DUPE USE ITEM 357348

## (undated) DEVICE — DRAPE,U/ SHT,SPLIT,PLAS,STERIL: Brand: MEDLINE

## (undated) DEVICE — WATERPROOF, BACTERIA PROOF DRESSING WITH ABSORBENT SEE THROUGH PAD: Brand: OPSITE POST-OP VISIBLE 25X10CM CTN 20

## (undated) DEVICE — SUTURE VCRL + SZ 1-0 L36IN ABSRB UD CTX 1/2 CIR TAPR PNT VCP977H

## (undated) DEVICE — SYRINGE MED 20ML STD CLR PLAS LUERLOCK TIP N CTRL DISP

## (undated) DEVICE — 1010 S-DRAPE TOWEL DRAPE 10/BX: Brand: STERI-DRAPE™

## (undated) DEVICE — CONNECTOR TBNG AUX H2O JET DISP FOR OLY 160/180 SER

## (undated) DEVICE — DUAL IRRIGATION ADAPTOR

## (undated) DEVICE — GAUZE SPONGES,12 PLY: Brand: CURITY

## (undated) DEVICE — REM POLYHESIVE ADULT PATIENT RETURN ELECTRODE: Brand: VALLEYLAB

## (undated) DEVICE — STERILE HOOK LOCK ELASTIC BANDAGE 6IN X 10 YARD: Brand: HOOK LOCK™

## (undated) DEVICE — KIT IV STRT W CHLORAPREP PD 1ML

## (undated) DEVICE — DERMABOND SKIN ADH 0.7ML -- DERMABOND ADVANCED 12/BX

## (undated) DEVICE — STERILE POLYISOPRENE POWDER-FREE SURGICAL GLOVES: Brand: PROTEXIS

## (undated) DEVICE — SUTURE MCRYL SZ 3-0 L27IN ABSRB UD L24MM PS-1 3/8 CIR PRIM Y936H

## (undated) DEVICE — CANN NASAL O2 CAPNOGRAPHY AD -- FILTERLINE

## (undated) DEVICE — SUTURE ABSRB BRAID COAT UD CT NO 1 36IN VCRL J959H

## (undated) DEVICE — T4 HOOD

## (undated) DEVICE — BW-412T DISP COMBO CLEANING BRUSH: Brand: SINGLE USE COMBINATION CLEANING BRUSH

## (undated) DEVICE — NEEDLE HYPO 18GA L1.5IN PNK S STL HUB POLYPR SHLD REG BVL

## (undated) DEVICE — SYR LR LCK 1ML GRAD NSAF 30ML --

## (undated) DEVICE — 3M™ STERI-DRAPE™ U-DRAPE 1015: Brand: STERI-DRAPE™

## (undated) DEVICE — SUTURE STRATAFIX SPRL SZ 1 L14IN ABSRB VLT L48CM CTX 1/2 SXPD2B405

## (undated) DEVICE — SET ADMIN 16ML TBNG L100IN 2 Y INJ SITE IV PIGGY BK DISP

## (undated) DEVICE — CATH IV AUTOGRD BC BLU 22GA 25 -- INSYTE

## (undated) DEVICE — TIBURON SPLIT SHEET: Brand: CONVERTORS

## (undated) DEVICE — INTENDED FOR TISSUE SEPARATION, AND OTHER PROCEDURES THAT REQUIRE A SHARP SURGICAL BLADE TO PUNCTURE OR CUT.: Brand: BARD-PARKER ® CARBON RIB-BACK BLADES

## (undated) DEVICE — KIT INFECTION CTRL ST FRAN --

## (undated) DEVICE — STRYKER PERFORMANCE SERIES SAGITTAL BLADE: Brand: STRYKER PERFORMANCE SERIES

## (undated) DEVICE — PREP KIT PEEL PTCH POVIDONE IOD

## (undated) DEVICE — STERILE POLYISOPRENE POWDER-FREE SURGICAL GLOVES WITH EMOLLIENT COATING: Brand: PROTEXIS

## (undated) DEVICE — KENDALL RADIOLUCENT FOAM MONITORING ELECTRODE -RECTANGULAR SHAPE: Brand: KENDALL

## (undated) DEVICE — NDL PRT INJ NSAF BLNT 18GX1.5 --

## (undated) DEVICE — SUTURE VCRL SZ 2-0 L36IN ABSRB UD L36MM CT-1 1/2 CIR J945H

## (undated) DEVICE — Z DISCONTINUED USE (MFG CAT 7984-37) SOLUTION IV SODIUM CHL 0.9% 100 ML INJ

## (undated) DEVICE — DRAPE,REIN 53X77,STERILE: Brand: MEDLINE

## (undated) DEVICE — DEVON™ KNEE AND BODY STRAP 60" X 3" (1.5 M X 7.6 CM): Brand: DEVON

## (undated) DEVICE — PLUS HANDPIECE WITH SUCTION TUBING AND TRAUMA TIP WITH SMALL SOFT CONE: Brand: SURGILAV

## (undated) DEVICE — Z DISCONTINUED USE 2751540 TUBING IRRIG L10IN DISP PMP ENDOGATOR

## (undated) DEVICE — SOLIDIFIER FLUID 3000 CC ABSORB

## (undated) DEVICE — AIRLIFE™ U/CONNECT-IT OXYGEN TUBING 7 FEET (2.1 M) CRUSH-RESISTANT OXYGEN TUBING, VINYL TIPPED: Brand: AIRLIFE™

## (undated) DEVICE — (D)PREP SKN CHLRAPRP APPL 26ML -- CONVERT TO ITEM 371833

## (undated) DEVICE — HANDPIECE SET WITH COAXIAL HIGH FLOW TIP AND SUCTION TUBE: Brand: INTERPULSE

## (undated) DEVICE — CONVERTORS STOCKINETTE: Brand: CONVERTORS

## (undated) DEVICE — 3M™ IOBAN™ 2 ANTIMICROBIAL INCISE DRAPE 6651EZ: Brand: IOBAN™ 2

## (undated) DEVICE — DRAPE, EXTREMITY, BILATERAL, STERILE: Brand: MEDLINE

## (undated) DEVICE — CONTAINER,SPECIMEN,3OZ,OR STRL: Brand: MEDLINE

## (undated) DEVICE — GOWN,SIRUS,NONRNF,SETINSLV,2XL,18/CS: Brand: MEDLINE

## (undated) DEVICE — APPLICATOR BNDG 1MM ADH PREMIERPRO EXOFIN

## (undated) DEVICE — QUILTED PREMIUM COMFORT UNDERPAD,EXTRA HEAVY: Brand: WINGS

## (undated) DEVICE — NEEDLE HYPO 21GA L1.5IN INTRAMUSCULAR S STL LATCH BVL UP

## (undated) DEVICE — Z DISCONTINUED NO SUB IDED SET EXTN W/ 4 W STPCOCK M SPIN LOK 36IN

## (undated) DEVICE — DRAPE,ISOLATION,ACTIGARD, 129"X100": Brand: MEDLINE

## (undated) DEVICE — INFECTION CONTROL KIT SYS

## (undated) DEVICE — NEONATAL-ADULT SPO2 SENSOR: Brand: NELLCOR